# Patient Record
Sex: FEMALE | Race: WHITE | NOT HISPANIC OR LATINO | Employment: OTHER | URBAN - METROPOLITAN AREA
[De-identification: names, ages, dates, MRNs, and addresses within clinical notes are randomized per-mention and may not be internally consistent; named-entity substitution may affect disease eponyms.]

---

## 2017-11-14 ENCOUNTER — HISTORICAL (OUTPATIENT)
Dept: RADIOLOGY | Facility: HOSPITAL | Age: 47
End: 2017-11-14

## 2018-03-28 ENCOUNTER — HISTORICAL (OUTPATIENT)
Dept: ADMINISTRATIVE | Facility: HOSPITAL | Age: 48
End: 2018-03-28

## 2018-05-03 ENCOUNTER — HISTORICAL (OUTPATIENT)
Dept: ADMINISTRATIVE | Facility: HOSPITAL | Age: 48
End: 2018-05-03

## 2018-05-06 ENCOUNTER — HISTORICAL (OUTPATIENT)
Dept: ADMINISTRATIVE | Facility: HOSPITAL | Age: 48
End: 2018-05-06

## 2018-05-08 ENCOUNTER — HISTORICAL (OUTPATIENT)
Dept: ADMINISTRATIVE | Facility: HOSPITAL | Age: 48
End: 2018-05-08

## 2019-03-13 ENCOUNTER — HISTORICAL (OUTPATIENT)
Dept: ADMINISTRATIVE | Facility: HOSPITAL | Age: 49
End: 2019-03-13

## 2019-03-14 ENCOUNTER — HISTORICAL (OUTPATIENT)
Dept: ADMINISTRATIVE | Facility: HOSPITAL | Age: 49
End: 2019-03-14

## 2019-03-16 ENCOUNTER — HISTORICAL (OUTPATIENT)
Dept: ADMINISTRATIVE | Facility: HOSPITAL | Age: 49
End: 2019-03-16

## 2019-04-09 ENCOUNTER — HISTORICAL (OUTPATIENT)
Dept: ADMINISTRATIVE | Facility: HOSPITAL | Age: 49
End: 2019-04-09

## 2019-09-16 ENCOUNTER — HISTORICAL (OUTPATIENT)
Dept: ADMINISTRATIVE | Facility: HOSPITAL | Age: 49
End: 2019-09-16

## 2019-10-01 ENCOUNTER — HISTORICAL (OUTPATIENT)
Dept: ADMINISTRATIVE | Facility: HOSPITAL | Age: 49
End: 2019-10-01

## 2019-12-06 ENCOUNTER — HISTORICAL (OUTPATIENT)
Dept: ADMINISTRATIVE | Facility: HOSPITAL | Age: 49
End: 2019-12-06

## 2021-10-22 ENCOUNTER — HISTORICAL (OUTPATIENT)
Dept: ADMINISTRATIVE | Facility: HOSPITAL | Age: 51
End: 2021-10-22

## 2021-11-02 ENCOUNTER — HISTORICAL (OUTPATIENT)
Dept: ADMINISTRATIVE | Facility: HOSPITAL | Age: 51
End: 2021-11-02

## 2022-11-01 ENCOUNTER — HISTORICAL (OUTPATIENT)
Dept: ADMINISTRATIVE | Facility: HOSPITAL | Age: 52
End: 2022-11-01

## 2023-03-23 ENCOUNTER — HOSPITAL ENCOUNTER (OUTPATIENT)
Dept: RADIOLOGY | Facility: HOSPITAL | Age: 53
Discharge: HOME | End: 2023-03-23
Attending: FAMILY MEDICINE
Payer: MEDICARE

## 2023-03-23 DIAGNOSIS — R51.9 FACIAL PAIN: ICD-10-CM

## 2023-03-23 PROCEDURE — 70450 CT HEAD/BRAIN W/O DYE: CPT | Mod: TC

## 2023-09-06 ENCOUNTER — HOSPITAL ENCOUNTER (OUTPATIENT)
Dept: RADIOLOGY | Facility: HOSPITAL | Age: 53
Discharge: HOME | End: 2023-09-06
Attending: FAMILY MEDICINE
Payer: MEDICARE

## 2023-09-06 DIAGNOSIS — M25.532 LEFT WRIST PAIN: ICD-10-CM

## 2023-09-06 PROCEDURE — 76881 US COMPL JOINT R-T W/IMG: CPT | Mod: TC,LT

## 2024-01-03 ENCOUNTER — HOSPITAL ENCOUNTER (OUTPATIENT)
Dept: RADIOLOGY | Facility: HOSPITAL | Age: 54
Discharge: HOME | End: 2024-01-03
Attending: FAMILY MEDICINE
Payer: MEDICARE

## 2024-01-03 DIAGNOSIS — R10.9 STOMACH ACHE: ICD-10-CM

## 2024-01-03 PROCEDURE — 74176 CT ABD & PELVIS W/O CONTRAST: CPT | Mod: TC

## 2024-04-01 ENCOUNTER — HOSPITAL ENCOUNTER (OUTPATIENT)
Dept: RADIOLOGY | Facility: HOSPITAL | Age: 54
Discharge: HOME | End: 2024-04-01
Attending: FAMILY MEDICINE
Payer: MEDICARE

## 2024-04-01 DIAGNOSIS — M25.531 RIGHT WRIST PAIN: ICD-10-CM

## 2024-04-01 PROCEDURE — 73110 X-RAY EXAM OF WRIST: CPT | Mod: TC,RT

## 2024-05-13 ENCOUNTER — HOSPITAL ENCOUNTER (INPATIENT)
Facility: HOSPITAL | Age: 54
LOS: 1 days | Discharge: HOME | DRG: 437 | End: 2024-05-14
Attending: STUDENT IN AN ORGANIZED HEALTH CARE EDUCATION/TRAINING PROGRAM | Admitting: INTERNAL MEDICINE
Payer: MEDICARE

## 2024-05-13 ENCOUNTER — HOSPITAL ENCOUNTER (EMERGENCY)
Facility: HOSPITAL | Age: 54
Discharge: HOME | End: 2024-05-13
Attending: FAMILY MEDICINE
Payer: MEDICARE

## 2024-05-13 VITALS
HEIGHT: 70 IN | SYSTOLIC BLOOD PRESSURE: 101 MMHG | OXYGEN SATURATION: 97 % | HEART RATE: 106 BPM | BODY MASS INDEX: 21.9 KG/M2 | DIASTOLIC BLOOD PRESSURE: 68 MMHG | WEIGHT: 153 LBS | RESPIRATION RATE: 18 BRPM | TEMPERATURE: 98 F

## 2024-05-13 DIAGNOSIS — C34.90 NON-SMALL CELL LUNG CANCER, UNSPECIFIED LATERALITY: ICD-10-CM

## 2024-05-13 DIAGNOSIS — C80.1 METASTASIS TO LIVER WITH UNKNOWN PRIMARY SITE: Primary | ICD-10-CM

## 2024-05-13 DIAGNOSIS — C78.7 METASTASIS TO LIVER WITH UNKNOWN PRIMARY SITE: Primary | ICD-10-CM

## 2024-05-13 DIAGNOSIS — N39.0 URINARY TRACT INFECTION WITHOUT HEMATURIA, SITE UNSPECIFIED: ICD-10-CM

## 2024-05-13 DIAGNOSIS — C34.90 LUNG CANCER: ICD-10-CM

## 2024-05-13 DIAGNOSIS — R10.9 ABDOMINAL PAIN, UNSPECIFIED ABDOMINAL LOCATION: ICD-10-CM

## 2024-05-13 LAB
ALBUMIN SERPL-MCNC: 4.4 G/DL (ref 3.4–5)
ALBUMIN/GLOB SERPL: 1.1 RATIO
ALP SERPL-CCNC: 579 UNIT/L (ref 50–144)
ALT SERPL-CCNC: 30 UNIT/L (ref 1–45)
AMYLASE SERPL-CCNC: 50 UNIT/L (ref 30–100)
ANION GAP SERPL CALC-SCNC: 11 MEQ/L (ref 2–13)
AST SERPL-CCNC: 45 UNIT/L (ref 14–36)
B-HCG FREE SERPL-ACNC: <2.39 MIU/ML
BACTERIA #/AREA URNS AUTO: ABNORMAL /HPF
BASOPHILS # BLD AUTO: 0.04 X10(3)/MCL (ref 0.01–0.08)
BASOPHILS NFR BLD AUTO: 0.2 % (ref 0.1–1.2)
BILIRUB SERPL-MCNC: 1.7 MG/DL (ref 0–1)
BILIRUB UR QL STRIP.AUTO: ABNORMAL
BUN SERPL-MCNC: 22 MG/DL (ref 7–20)
CALCIUM SERPL-MCNC: 10.2 MG/DL (ref 8.4–10.2)
CHLORIDE SERPL-SCNC: 95 MMOL/L (ref 98–110)
CLARITY UR: CLEAR
CO2 SERPL-SCNC: 29 MMOL/L (ref 21–32)
COLOR UR AUTO: YELLOW
CREAT SERPL-MCNC: 1.45 MG/DL (ref 0.66–1.25)
CREAT/UREA NIT SERPL: 15 (ref 12–20)
EOSINOPHIL # BLD AUTO: 0.01 X10(3)/MCL (ref 0.04–0.36)
EOSINOPHIL NFR BLD AUTO: 0.1 % (ref 0.7–7)
ERYTHROCYTE [DISTWIDTH] IN BLOOD BY AUTOMATED COUNT: 13.8 % (ref 11–14.5)
GFR SERPLBLD CREATININE-BSD FMLA CKD-EPI: 43 ML/MIN/1.73/M2
GLOBULIN SER-MCNC: 3.9 GM/DL (ref 2–3.9)
GLUCOSE SERPL-MCNC: 258 MG/DL (ref 70–115)
GLUCOSE UR QL STRIP: 250
GRAN CASTS URNS QL MICRO: ABNORMAL /LPF
HCT VFR BLD AUTO: 38.1 % (ref 36–48)
HGB BLD-MCNC: 12.3 G/DL (ref 11.8–16)
HGB UR QL STRIP: ABNORMAL
IMM GRANULOCYTES # BLD AUTO: 0.08 X10(3)/MCL (ref 0–0.03)
IMM GRANULOCYTES NFR BLD AUTO: 0.5 % (ref 0–0.5)
KETONES UR QL STRIP: ABNORMAL
LEUKOCYTE ESTERASE UR QL STRIP: ABNORMAL
LIPASE SERPL-CCNC: <20 U/L (ref 23–300)
LYMPHOCYTES # BLD AUTO: 0.66 X10(3)/MCL (ref 1.16–3.74)
LYMPHOCYTES NFR BLD AUTO: 4 % (ref 20–55)
MAGNESIUM SERPL-MCNC: 2.3 MG/DL (ref 1.8–2.4)
MCH RBC QN AUTO: 25.8 PG (ref 27–34)
MCHC RBC AUTO-ENTMCNC: 32.3 G/DL (ref 31–37)
MCV RBC AUTO: 79.9 FL (ref 79–99)
MONOCYTES # BLD AUTO: 1.28 X10(3)/MCL (ref 0.24–0.36)
MONOCYTES NFR BLD AUTO: 7.8 % (ref 4.7–12.5)
NEUTROPHILS # BLD AUTO: 14.31 X10(3)/MCL (ref 1.56–6.13)
NEUTROPHILS NFR BLD AUTO: 87.4 % (ref 37–73)
NITRITE UR QL STRIP: POSITIVE
NRBC BLD AUTO-RTO: 0 %
PH UR STRIP: 6 [PH]
PLATELET # BLD AUTO: 275 X10(3)/MCL (ref 140–371)
PMV BLD AUTO: 9.5 FL (ref 9.4–12.4)
POTASSIUM SERPL-SCNC: 3.7 MMOL/L (ref 3.5–5.1)
PROT SERPL-MCNC: 8.3 GM/DL (ref 6.3–8.2)
PROT UR QL STRIP: >=300
RBC # BLD AUTO: 4.77 X10(6)/MCL (ref 4–5.1)
RBC #/AREA URNS AUTO: ABNORMAL /HPF
SODIUM SERPL-SCNC: 135 MMOL/L (ref 136–145)
SP GR UR STRIP.AUTO: >=1.03 (ref 1–1.03)
SQUAMOUS #/AREA URNS AUTO: ABNORMAL /HPF
UROBILINOGEN UR STRIP-ACNC: 4
WBC # SPEC AUTO: 16.38 X10(3)/MCL (ref 4–11.5)
WBC #/AREA URNS AUTO: ABNORMAL /HPF

## 2024-05-13 PROCEDURE — 96361 HYDRATE IV INFUSION ADD-ON: CPT

## 2024-05-13 PROCEDURE — 25000003 PHARM REV CODE 250: Performed by: FAMILY MEDICINE

## 2024-05-13 PROCEDURE — 83690 ASSAY OF LIPASE: CPT | Performed by: FAMILY MEDICINE

## 2024-05-13 PROCEDURE — 87086 URINE CULTURE/COLONY COUNT: CPT | Performed by: FAMILY MEDICINE

## 2024-05-13 PROCEDURE — 82150 ASSAY OF AMYLASE: CPT | Performed by: FAMILY MEDICINE

## 2024-05-13 PROCEDURE — 83735 ASSAY OF MAGNESIUM: CPT | Performed by: FAMILY MEDICINE

## 2024-05-13 PROCEDURE — 80053 COMPREHEN METABOLIC PANEL: CPT | Performed by: FAMILY MEDICINE

## 2024-05-13 PROCEDURE — 25000003 PHARM REV CODE 250: Performed by: INTERNAL MEDICINE

## 2024-05-13 PROCEDURE — 11000001 HC ACUTE MED/SURG PRIVATE ROOM

## 2024-05-13 PROCEDURE — 85025 COMPLETE CBC W/AUTO DIFF WBC: CPT | Performed by: FAMILY MEDICINE

## 2024-05-13 PROCEDURE — 81001 URINALYSIS AUTO W/SCOPE: CPT | Performed by: FAMILY MEDICINE

## 2024-05-13 PROCEDURE — 96376 TX/PRO/DX INJ SAME DRUG ADON: CPT

## 2024-05-13 PROCEDURE — 84702 CHORIONIC GONADOTROPIN TEST: CPT | Performed by: FAMILY MEDICINE

## 2024-05-13 PROCEDURE — 96365 THER/PROPH/DIAG IV INF INIT: CPT | Mod: 59

## 2024-05-13 PROCEDURE — 25500020 PHARM REV CODE 255: Performed by: FAMILY MEDICINE

## 2024-05-13 PROCEDURE — 99285 EMERGENCY DEPT VISIT HI MDM: CPT | Mod: 25

## 2024-05-13 PROCEDURE — 96375 TX/PRO/DX INJ NEW DRUG ADDON: CPT

## 2024-05-13 PROCEDURE — 63600175 PHARM REV CODE 636 W HCPCS: Performed by: FAMILY MEDICINE

## 2024-05-13 RX ORDER — ACETAMINOPHEN 325 MG/1
650 TABLET ORAL EVERY 8 HOURS PRN
Status: DISCONTINUED | OUTPATIENT
Start: 2024-05-13 | End: 2024-05-14 | Stop reason: HOSPADM

## 2024-05-13 RX ORDER — ONDANSETRON HYDROCHLORIDE 2 MG/ML
4 INJECTION, SOLUTION INTRAVENOUS
Status: COMPLETED | OUTPATIENT
Start: 2024-05-13 | End: 2024-05-13

## 2024-05-13 RX ORDER — HYDROMORPHONE HYDROCHLORIDE 1 MG/ML
0.5 INJECTION, SOLUTION INTRAMUSCULAR; INTRAVENOUS; SUBCUTANEOUS
Status: COMPLETED | OUTPATIENT
Start: 2024-05-13 | End: 2024-05-13

## 2024-05-13 RX ORDER — ONDANSETRON HYDROCHLORIDE 2 MG/ML
4 INJECTION, SOLUTION INTRAVENOUS EVERY 8 HOURS PRN
Status: DISCONTINUED | OUTPATIENT
Start: 2024-05-13 | End: 2024-05-14 | Stop reason: HOSPADM

## 2024-05-13 RX ORDER — SODIUM CHLORIDE 0.9 % (FLUSH) 0.9 %
10 SYRINGE (ML) INJECTION
Status: DISCONTINUED | OUTPATIENT
Start: 2024-05-13 | End: 2024-05-14 | Stop reason: HOSPADM

## 2024-05-13 RX ORDER — OXYCODONE HYDROCHLORIDE 5 MG/1
5 TABLET ORAL EVERY 4 HOURS PRN
Status: DISCONTINUED | OUTPATIENT
Start: 2024-05-13 | End: 2024-05-14 | Stop reason: HOSPADM

## 2024-05-13 RX ORDER — TRAZODONE HYDROCHLORIDE 100 MG/1
2 TABLET ORAL NIGHTLY
COMMUNITY

## 2024-05-13 RX ORDER — TALC
6 POWDER (GRAM) TOPICAL NIGHTLY PRN
Status: DISCONTINUED | OUTPATIENT
Start: 2024-05-13 | End: 2024-05-14 | Stop reason: HOSPADM

## 2024-05-13 RX ORDER — HYDROMORPHONE HYDROCHLORIDE 1 MG/ML
1 INJECTION, SOLUTION INTRAMUSCULAR; INTRAVENOUS; SUBCUTANEOUS
Status: COMPLETED | OUTPATIENT
Start: 2024-05-13 | End: 2024-05-13

## 2024-05-13 RX ADMIN — ONDANSETRON 4 MG: 2 INJECTION INTRAMUSCULAR; INTRAVENOUS at 11:05

## 2024-05-13 RX ADMIN — CEFTRIAXONE SODIUM 1 G: 1 INJECTION, POWDER, FOR SOLUTION INTRAMUSCULAR; INTRAVENOUS at 12:05

## 2024-05-13 RX ADMIN — Medication 6 MG: at 10:05

## 2024-05-13 RX ADMIN — HYDROMORPHONE HYDROCHLORIDE 0.5 MG: 1 INJECTION, SOLUTION INTRAMUSCULAR; INTRAVENOUS; SUBCUTANEOUS at 12:05

## 2024-05-13 RX ADMIN — HYDROMORPHONE HYDROCHLORIDE 0.5 MG: 1 INJECTION, SOLUTION INTRAMUSCULAR; INTRAVENOUS; SUBCUTANEOUS at 03:05

## 2024-05-13 RX ADMIN — IOHEXOL 90 ML: 300 INJECTION, SOLUTION INTRAVENOUS at 12:05

## 2024-05-13 RX ADMIN — SODIUM CHLORIDE 1000 ML: 9 INJECTION, SOLUTION INTRAVENOUS at 12:05

## 2024-05-13 RX ADMIN — OXYCODONE HYDROCHLORIDE 5 MG: 5 TABLET ORAL at 10:05

## 2024-05-13 RX ADMIN — HYDROMORPHONE HYDROCHLORIDE 1 MG: 1 INJECTION, SOLUTION INTRAMUSCULAR; INTRAVENOUS; SUBCUTANEOUS at 11:05

## 2024-05-13 RX ADMIN — SODIUM CHLORIDE 1000 ML: 9 INJECTION, SOLUTION INTRAVENOUS at 11:05

## 2024-05-13 NOTE — ED PROVIDER NOTES
Encounter Date: 5/13/2024       History     Chief Complaint   Patient presents with    Abdominal Pain    Dysuria    Nausea    Vomiting     PRESENTS TO ED PER POV WITH C/O RLQ ABDOMINAL PAIN RADIATING TO RT. ARM, N/V, AND DYSURIA. SENT TO ED FOR EVAL BY DR. MONTES     Patient presents for evaluation of abdominal pain.  Patient notes having abdominal pain for the past several weeks but pain has been more intense for the past 2 weeks.  Pain is present constantly throughout the day and patient has frequent painful episodes.  Patient notes having some mild nausea at times but emergency room at present is not having any nausea.  At present pain is moderate-to-severe aching pain that is constant.  Pressure to the right lower quadrant worsens pain.  Patient also notes having some anorexia with her symptoms.  Patient denies having any other associated symptoms at present.    The history is provided by the patient.     Review of patient's allergies indicates:   Allergen Reactions    Ketorolac Hives    Tramadol Hives    Vancomycin analogues Hives     Past Medical History:   Diagnosis Date    Brain tumor     Cervical ca     Depression     Opioid abuse      Past Surgical History:   Procedure Laterality Date    BRAIN SURGERY      CHOLECYSTECTOMY      HYSTERECTOMY       No family history on file.  Social History     Tobacco Use    Smoking status: Former     Types: Cigarettes    Smokeless tobacco: Never   Substance Use Topics    Alcohol use: Never    Drug use: Not Currently     Types: Cocaine     Comment: 5-6 YEARS QUIT ON SUBOXONE     Review of Systems   Constitutional: Negative.    HENT: Negative.     Eyes: Negative.    Respiratory: Negative.     Cardiovascular: Negative.    Gastrointestinal:  Positive for abdominal pain and nausea.   Endocrine: Negative.    Genitourinary: Negative.    Musculoskeletal: Negative.    Skin: Negative.    Allergic/Immunologic: Negative.    Neurological: Negative.    Hematological: Negative.     Psychiatric/Behavioral: Negative.         Physical Exam     Initial Vitals [05/13/24 1020]   BP Pulse Resp Temp SpO2   119/79 95 18 97.8 °F (36.6 °C) 98 %      MAP       --         Physical Exam    Vitals reviewed.  Constitutional: She appears well-developed and well-nourished. She is not diaphoretic. No distress.   HENT:   Head: Normocephalic and atraumatic.   Mouth/Throat: No oropharyngeal exudate.   Eyes: Conjunctivae and EOM are normal. Pupils are equal, round, and reactive to light. Right eye exhibits no discharge. Left eye exhibits no discharge. No scleral icterus.   Neck: No thyromegaly present. No tracheal deviation present. No JVD present.   Normal range of motion.  Cardiovascular:  Normal rate, regular rhythm and intact distal pulses.     Exam reveals no gallop and no friction rub.       No murmur heard.  Pulmonary/Chest: Breath sounds normal. No stridor. No respiratory distress. She has no wheezes. She has no rhonchi. She has no rales. She exhibits no tenderness.   Abdominal: Abdomen is soft. Bowel sounds are normal. She exhibits no distension and no mass. There is abdominal tenderness.   Moderate-to-severe tenderness to right upper quadrant. There is guarding. There is no rebound.   Musculoskeletal:         General: No tenderness or edema. Normal range of motion.      Cervical back: Normal range of motion.     Neurological: She is alert and oriented to person, place, and time. She has normal reflexes. She displays normal reflexes. No cranial nerve deficit or sensory deficit. GCS score is 15. GCS eye subscore is 4. GCS verbal subscore is 5. GCS motor subscore is 6.   Skin: Skin is warm. No rash and no abscess noted. No erythema. No pallor.   Psychiatric: She has a normal mood and affect.         ED Course   Procedures  Labs Reviewed   COMPREHENSIVE METABOLIC PANEL - Abnormal; Notable for the following components:       Result Value    Sodium 135 (*)     Chloride 95 (*)     Glucose 258 (*)     Blood Urea  Nitrogen 22 (*)     Creatinine 1.45 (*)     Protein Total 8.3 (*)     Bilirubin Total 1.7 (*)      (*)     AST 45 (*)     All other components within normal limits   LIPASE - Abnormal; Notable for the following components:    Lipase Level <20 (*)     All other components within normal limits   URINALYSIS - Abnormal; Notable for the following components:    Protein, UA >=300 (*)     Glucose,  (*)     Ketones, UA Trace (*)     Blood, UA Small (*)     Bilirubin, UA Moderate (*)     Urobilinogen, UA 4.0 (*)     Nitrites, UA Positive (*)     Leukocyte Esterase, UA Trace (*)     All other components within normal limits    Narrative:      URINE STABILITY IS 2 HOURS AT ROOM TEMP OR    SIX HOURS REFRIGERATED. PERFORMING TESTING ON    SPECIMENS GREATER THAN THIS AGE MAY AFFECT THE    FOLLOWING TESTS:    PH          SPECIFIC GRAVITY           BLOOD    CLARITY     BILIRUBIN               UROBILINOGEN   CBC WITH DIFFERENTIAL - Abnormal; Notable for the following components:    WBC 16.38 (*)     MCH 25.8 (*)     Neut % 87.4 (*)     Lymph % 4.0 (*)     Eos % 0.1 (*)     Lymph # 0.66 (*)     Neut # 14.31 (*)     Mono # 1.28 (*)     Eos # 0.01 (*)     IG# 0.08 (*)     All other components within normal limits   URINALYSIS, MICROSCOPIC - Abnormal; Notable for the following components:    Bacteria, UA Moderate (*)     Granular Casts, UA Few (*)     WBC, UA 6-10 (*)     Squamous Epithelial Cells, UA Few (*)     All other components within normal limits   MAGNESIUM - Normal   AMYLASE - Normal   CULTURE, URINE   CBC W/ AUTO DIFFERENTIAL    Narrative:     The following orders were created for panel order CBC auto differential.  Procedure                               Abnormality         Status                     ---------                               -----------         ------                     CBC with Differential[[id number]]       Abnormal            Final result                 Please view results for these tests on  the individual orders.   HCG, QUANTITATIVE    Narrative:     Beta-HCG ref range weeks after implantation (mIU/mL):   3-4wks 9-130   4-5wks    5-6wks 850-07570   6-7wks 4500-806239   7-12wks [id number]   12-16wks 39858-462193   16-28wks 1400-36339   20-41wks [phone]          Imaging Results              CT Abdomen Pelvis With IV Contrast NO Oral Contrast (Final result)  Result time 05/13/24 13:41:24      Final result by Javi Trujillo MD (05/13/24 13:41:24)                   Impression:      Changes most consistent with metastatic disease to the liver.      Electronically signed by: Javi Trujillo MD  Date:    05/13/2024  Time:    13:41               Narrative:    EXAMINATION:  CT ABDOMEN PELVIS WITH IV CONTRAST    CLINICAL HISTORY:  Non-small cell lung cancer (NSCLC), staging;    TECHNIQUE:  Low dose axial images, sagittal and coronal reformations were obtained from the lung bases to the pubic symphysis following the IV administration of 90 mL of Omnipaque 300 no oral contrast was given.    Automatic exposure control (AEC) was utilized for dose reduction.    Dose: 403 mGycm    COMPARISON:  01/03/2024    FINDINGS:  There are mild atelectatic changes in the left lung base.  There are lesions throughout the liver highly suspicious for metastatic disease.  The largest measures 5.8 x 7 cm.  Spleen appears normal.  Pancreas appears normal.  The adrenals are not enlarged.  Kidneys appear normal.  Aorta shows no evidence of an aneurysm.  The appendix appears normal.  Small bowel is not dilated                                       Medications   sodium chloride 0.9% bolus 1,000 mL 1,000 mL (0 mLs Intravenous Stopped 5/13/24 1208)   HYDROmorphone injection 1 mg (1 mg Intravenous Given 5/13/24 1139)   ondansetron injection 4 mg (4 mg Intravenous Given 5/13/24 1139)   sodium chloride 0.9% bolus 1,000 mL 1,000 mL (0 mLs Intravenous Stopped 5/13/24 1324)   cefTRIAXone (Rocephin) 1 g in dextrose 5 % in water (D5W) 100  mL IVPB (MB+) (0 g Intravenous Stopped 5/13/24 1254)   iohexoL (OMNIPAQUE 300) injection 90 mL (90 mLs Intravenous Given 5/13/24 1231)   HYDROmorphone injection 0.5 mg (0.5 mg Intravenous Given 5/13/24 1241)     Medical Decision Making  Amount and/or Complexity of Data Reviewed  Labs: ordered.  Radiology: ordered.    Risk  Prescription drug management.                           Patient accepted for admission by Dr. Ruano at [facility].           Clinical Impression:  Final diagnoses:  [C78.7, C80.1] Metastasis to liver with unknown primary site (Primary)  [R10.9] Abdominal pain, unspecified abdominal location  [C34.90] Non-small cell lung cancer, unspecified laterality  [N39.0] Urinary tract infection without hematuria, site unspecified          ED Disposition Condition    Transfer to Another Facility Jamal Zaldivar MD  05/13/24 5446

## 2024-05-14 VITALS
RESPIRATION RATE: 18 BRPM | HEART RATE: 92 BPM | SYSTOLIC BLOOD PRESSURE: 105 MMHG | BODY MASS INDEX: 22.15 KG/M2 | WEIGHT: 154.75 LBS | DIASTOLIC BLOOD PRESSURE: 70 MMHG | TEMPERATURE: 98 F | HEIGHT: 70 IN | OXYGEN SATURATION: 96 %

## 2024-05-14 LAB
AFP-TM SERPL-MCNC: <2 NG/ML
ALBUMIN SERPL-MCNC: 2.6 G/DL (ref 3.5–5)
ALBUMIN/GLOB SERPL: 0.7 RATIO (ref 1.1–2)
ALP SERPL-CCNC: 459 UNIT/L (ref 40–150)
ALT SERPL-CCNC: 12 UNIT/L (ref 0–55)
AST SERPL-CCNC: 18 UNIT/L (ref 5–34)
BACTERIA #/AREA URNS AUTO: ABNORMAL /HPF
BASOPHILS # BLD AUTO: 0.03 X10(3)/MCL
BASOPHILS NFR BLD AUTO: 0.3 %
BILIRUB SERPL-MCNC: 0.6 MG/DL
BILIRUB UR QL STRIP.AUTO: NEGATIVE
BUN SERPL-MCNC: 13.9 MG/DL (ref 9.8–20.1)
CALCIUM SERPL-MCNC: 9.1 MG/DL (ref 8.4–10.2)
CANCER AG125 SERPL-ACNC: 58.4 UNIT/ML (ref 0–35)
CANCER AG19-9 SERPL-ACNC: 302.04 UNIT/ML (ref 0–37)
CEA SERPL-MCNC: 546.58 NG/ML (ref 0–3)
CHLORIDE SERPL-SCNC: 105 MMOL/L (ref 98–107)
CLARITY UR: CLEAR
CO2 SERPL-SCNC: 26 MMOL/L (ref 22–29)
COLOR UR AUTO: YELLOW
CREAT SERPL-MCNC: 0.82 MG/DL (ref 0.55–1.02)
EOSINOPHIL # BLD AUTO: 0.1 X10(3)/MCL (ref 0–0.9)
EOSINOPHIL NFR BLD AUTO: 0.9 %
ERYTHROCYTE [DISTWIDTH] IN BLOOD BY AUTOMATED COUNT: 14.2 % (ref 11.5–17)
GFR SERPLBLD CREATININE-BSD FMLA CKD-EPI: >60 ML/MIN/1.73/M2
GLOBULIN SER-MCNC: 3.8 GM/DL (ref 2.4–3.5)
GLUCOSE SERPL-MCNC: 114 MG/DL (ref 74–100)
GLUCOSE UR QL STRIP: ABNORMAL
HCT VFR BLD AUTO: 30.2 % (ref 37–47)
HGB BLD-MCNC: 9.8 G/DL (ref 12–16)
HGB UR QL STRIP: ABNORMAL
IMM GRANULOCYTES # BLD AUTO: 0.05 X10(3)/MCL (ref 0–0.04)
IMM GRANULOCYTES NFR BLD AUTO: 0.5 %
KETONES UR QL STRIP: NEGATIVE
LEUKOCYTE ESTERASE UR QL STRIP: NEGATIVE
LYMPHOCYTES # BLD AUTO: 1.75 X10(3)/MCL (ref 0.6–4.6)
LYMPHOCYTES NFR BLD AUTO: 16.3 %
MAGNESIUM SERPL-MCNC: 2.1 MG/DL (ref 1.6–2.6)
MCH RBC QN AUTO: 26.1 PG (ref 27–31)
MCHC RBC AUTO-ENTMCNC: 32.5 G/DL (ref 33–36)
MCV RBC AUTO: 80.3 FL (ref 80–94)
MONOCYTES # BLD AUTO: 1.23 X10(3)/MCL (ref 0.1–1.3)
MONOCYTES NFR BLD AUTO: 11.4 %
MUCOUS THREADS URNS QL MICRO: ABNORMAL /LPF
NEUTROPHILS # BLD AUTO: 7.59 X10(3)/MCL (ref 2.1–9.2)
NEUTROPHILS NFR BLD AUTO: 70.6 %
NITRITE UR QL STRIP: NEGATIVE
NRBC BLD AUTO-RTO: 0 %
PH UR STRIP: 6 [PH]
PLATELET # BLD AUTO: 254 X10(3)/MCL (ref 130–400)
PMV BLD AUTO: 10.2 FL (ref 7.4–10.4)
POTASSIUM SERPL-SCNC: 3.4 MMOL/L (ref 3.5–5.1)
PROT SERPL-MCNC: 6.4 GM/DL (ref 6.4–8.3)
PROT UR QL STRIP: ABNORMAL
RBC # BLD AUTO: 3.76 X10(6)/MCL (ref 4.2–5.4)
RBC #/AREA URNS AUTO: ABNORMAL /HPF
SODIUM SERPL-SCNC: 138 MMOL/L (ref 136–145)
SP GR UR STRIP.AUTO: 1.03 (ref 1–1.03)
SQUAMOUS #/AREA URNS LPF: ABNORMAL /HPF
UROBILINOGEN UR STRIP-ACNC: 8
WBC # SPEC AUTO: 10.75 X10(3)/MCL (ref 4.5–11.5)
WBC #/AREA URNS AUTO: ABNORMAL /HPF

## 2024-05-14 PROCEDURE — 86301 IMMUNOASSAY TUMOR CA 19-9: CPT | Performed by: INTERNAL MEDICINE

## 2024-05-14 PROCEDURE — 25000003 PHARM REV CODE 250: Performed by: INTERNAL MEDICINE

## 2024-05-14 PROCEDURE — 85025 COMPLETE CBC W/AUTO DIFF WBC: CPT | Performed by: INTERNAL MEDICINE

## 2024-05-14 PROCEDURE — 63600175 PHARM REV CODE 636 W HCPCS: Performed by: INTERNAL MEDICINE

## 2024-05-14 PROCEDURE — 83735 ASSAY OF MAGNESIUM: CPT | Performed by: INTERNAL MEDICINE

## 2024-05-14 PROCEDURE — 86304 IMMUNOASSAY TUMOR CA 125: CPT | Performed by: INTERNAL MEDICINE

## 2024-05-14 PROCEDURE — 36415 COLL VENOUS BLD VENIPUNCTURE: CPT | Performed by: INTERNAL MEDICINE

## 2024-05-14 PROCEDURE — 82378 CARCINOEMBRYONIC ANTIGEN: CPT | Performed by: INTERNAL MEDICINE

## 2024-05-14 PROCEDURE — 80053 COMPREHEN METABOLIC PANEL: CPT | Performed by: INTERNAL MEDICINE

## 2024-05-14 PROCEDURE — 81001 URINALYSIS AUTO W/SCOPE: CPT | Performed by: INTERNAL MEDICINE

## 2024-05-14 PROCEDURE — 82105 ALPHA-FETOPROTEIN SERUM: CPT | Performed by: INTERNAL MEDICINE

## 2024-05-14 RX ORDER — POTASSIUM CHLORIDE 20 MEQ/1
40 TABLET, EXTENDED RELEASE ORAL
Status: COMPLETED | OUTPATIENT
Start: 2024-05-14 | End: 2024-05-14

## 2024-05-14 RX ORDER — MORPHINE SULFATE 4 MG/ML
2 INJECTION, SOLUTION INTRAMUSCULAR; INTRAVENOUS EVERY 4 HOURS PRN
Status: DISCONTINUED | OUTPATIENT
Start: 2024-05-14 | End: 2024-05-14 | Stop reason: HOSPADM

## 2024-05-14 RX ORDER — TRAZODONE HYDROCHLORIDE 100 MG/1
200 TABLET ORAL NIGHTLY
Status: DISCONTINUED | OUTPATIENT
Start: 2024-05-14 | End: 2024-05-14 | Stop reason: HOSPADM

## 2024-05-14 RX ORDER — OXYCODONE HYDROCHLORIDE 5 MG/1
5 TABLET ORAL EVERY 6 HOURS PRN
Qty: 28 TABLET | Refills: 0 | Status: SHIPPED | OUTPATIENT
Start: 2024-05-14 | End: 2024-05-21

## 2024-05-14 RX ADMIN — TRAZODONE HYDROCHLORIDE 200 MG: 100 TABLET ORAL at 01:05

## 2024-05-14 RX ADMIN — ONDANSETRON 4 MG: 2 INJECTION INTRAMUSCULAR; INTRAVENOUS at 12:05

## 2024-05-14 RX ADMIN — OXYCODONE HYDROCHLORIDE 5 MG: 5 TABLET ORAL at 07:05

## 2024-05-14 RX ADMIN — ONDANSETRON 4 MG: 2 INJECTION INTRAMUSCULAR; INTRAVENOUS at 09:05

## 2024-05-14 RX ADMIN — POTASSIUM CHLORIDE 40 MEQ: 1500 TABLET, EXTENDED RELEASE ORAL at 09:05

## 2024-05-14 RX ADMIN — POTASSIUM CHLORIDE 40 MEQ: 1500 TABLET, EXTENDED RELEASE ORAL at 11:05

## 2024-05-14 RX ADMIN — MORPHINE SULFATE 2 MG: 4 INJECTION INTRAVENOUS at 12:05

## 2024-05-14 RX ADMIN — OXYCODONE HYDROCHLORIDE 5 MG: 5 TABLET ORAL at 11:05

## 2024-05-14 NOTE — PLAN OF CARE
Problem: Adult Inpatient Plan of Care  Goal: Plan of Care Review  Outcome: Progressing  Flowsheets (Taken 5/14/2024 0344)  Plan of Care Reviewed With: patient  Goal: Patient-Specific Goal (Individualized)  Outcome: Progressing  Flowsheets (Taken 5/14/2024 0344)  Individualized Care Needs: monitor labs., manage pain  Goal: Absence of Hospital-Acquired Illness or Injury  Outcome: Progressing  Intervention: Identify and Manage Fall Risk  Flowsheets (Taken 5/14/2024 0344)  Safety Promotion/Fall Prevention:   assistive device/personal item within reach   nonskid shoes/socks when out of bed  Intervention: Prevent Skin Injury  Flowsheets (Taken 5/14/2024 0344)  Body Position:   position changed independently   weight shifting  Device Skin Pressure Protection: adhesive use limited  Intervention: Prevent and Manage VTE (Venous Thromboembolism) Risk  Flowsheets (Taken 5/14/2024 0344)  VTE Prevention/Management:   ambulation promoted   fluids promoted  Intervention: Prevent Infection  Flowsheets (Taken 5/14/2024 0344)  Infection Prevention:   hand hygiene promoted   single patient room provided   rest/sleep promoted  Goal: Optimal Comfort and Wellbeing  Outcome: Progressing  Intervention: Monitor Pain and Promote Comfort  Flowsheets (Taken 5/14/2024 0344)  Pain Management Interventions:   care clustered   medication offered   quiet environment facilitated  Intervention: Provide Person-Centered Care  Flowsheets (Taken 5/14/2024 0344)  Trust Relationship/Rapport:   care explained   choices provided   questions answered   questions encouraged   reassurance provided   emotional support provided   empathic listening provided   thoughts/feelings acknowledged  Goal: Readiness for Transition of Care  Outcome: Progressing

## 2024-05-14 NOTE — PLAN OF CARE
Problem: Adult Inpatient Plan of Care  Goal: Plan of Care Review  Outcome: Progressing  Flowsheets (Taken 5/14/2024 0754)  Plan of Care Reviewed With:   patient   child  Goal: Patient-Specific Goal (Individualized)  Outcome: Progressing  Goal: Absence of Hospital-Acquired Illness or Injury  Outcome: Progressing  Goal: Optimal Comfort and Wellbeing  Outcome: Progressing  Goal: Readiness for Transition of Care  Outcome: Progressing

## 2024-05-14 NOTE — PLAN OF CARE
Problem: Adult Inpatient Plan of Care  Goal: Plan of Care Review  5/14/2024 1627 by Kimberly Lezama RN  Outcome: Met  5/14/2024 0751 by Kimberly Lezama RN  Outcome: Progressing  Flowsheets (Taken 5/14/2024 0751)  Plan of Care Reviewed With:   patient   child  Goal: Patient-Specific Goal (Individualized)  5/14/2024 1627 by Kimberly Lezama RN  Outcome: Met  5/14/2024 0751 by Kimberly Lezama RN  Outcome: Progressing  Goal: Absence of Hospital-Acquired Illness or Injury  5/14/2024 1627 by Kimberly Lezama RN  Outcome: Met  5/14/2024 0751 by Kimberly Lezama RN  Outcome: Progressing  Goal: Optimal Comfort and Wellbeing  5/14/2024 1627 by Kimberly Lezama RN  Outcome: Met  5/14/2024 0751 by Kimberly Lezama RN  Outcome: Progressing  Goal: Readiness for Transition of Care  5/14/2024 1627 by Kimberly Lezama RN  Outcome: Met  5/14/2024 0751 by Kimberly Lezama RN  Outcome: Progressing

## 2024-05-14 NOTE — DISCHARGE SUMMARY
[facility] Hospital Medicine Discharge Summary    Admit Date: 5/13/2024  Discharge Date and Time: 5/14/20242:44 PM  Admitting Physician:  Team  Discharging Physician: Neha Stevens MD.  Primary Care Physician: Amado Baig III, MD  Consults: Hospital Medicine and Interventional Radiology    Discharge Diagnoses:  Metastatic appearing liver lesions, uncertain etiology     Hx:  Benign brain tumor,  shunt, cervical cancer 15 years ago, benign thyroid tumor, opioid use disorder/dependence history       Hospital Course:   CHIEF COMPLAINT   Transferred for abnormal CT abdomen     HISTORY OF PRESENT ILLNESS:   Patient is a 54-year-old female with a history of cervical cancer over 15 years ago she reported was resected surgically and was local, followed by a total abdominal hysterectomy with no reported recurrence.  She also has a history of a brain tumor that was biopsied and found to be benign but require placement of a  shunt back in the 1980s.  She also had a benign thyroid tumor resected 8 years ago.  She has a history of opioid dependence but has been off of long-acting opiates for quite some time and not using drugs per her report.  She presented to an outside ER [facility] with complaint of abdominal pain x1 month and CT of the abdomen showed metastatic appearing liver lesions.  For this reason she was transferred here for an oncology evaluation.  Patient reports she was up-to-date on her mammograms and colonoscopy all within the last 3 years and negative.    She was admitted due to intractable pain from the metastatic appearing liver lesions.  IR was consulted and scheduled an outpatient CT-guided biopsy.  Patient was given IV pain meds and discharged on p.o. opiates-     Pt was seen and examined on the day of discharge  Vitals:  VITAL SIGNS: 24 HRS MIN & MAX LAST   Temp  Min: 97.9 °F (36.6 °C)  Max: 98.8 °F (37.1 °C) 98.7 °F (37.1 °C)   BP  Min: 85/53  Max: 133/82 96/66    Pulse  Min: 67  Max: 107  82   Resp  Min: 18  Max: 20 18   SpO2  Min: 94 %  Max: 99 % 95 %       Physical Exam:  GENERAL: awake, alert, oriented and in no acute distress, non-toxic appearing   HEENT: normocephalic atraumatic   NECK: supple   LUNGS: Clear bilaterally, no wheezing or rales, no accessory muscle use   CVS: Regular rate and rhythm, normal peripheral perfusion  ABD: Soft, non-tender, non-distended, bowel sounds present  EXTREMITIES: no clubbing or cyanosis  SKIN: Warm, dry.   NEURO: alert and oriented, grossly without focal deficits   PSYCHIATRIC: Cooperative    Procedures Performed: No admission procedures for hospital encounter.     Significant Diagnostic Studies: See Full reports for all details    Recent Labs   Lab 05/13/24  1056 05/14/24  0320   WBC 16.38* 10.75   RBC 4.77 3.76*   HGB 12.3 9.8*   HCT 38.1 30.2*   MCV 79.9 80.3   MCH 25.8* 26.1*   MCHC 32.3 32.5*   RDW 13.8 14.2    254   MPV 9.5 10.2       Recent Labs   Lab 05/13/24  1056 05/14/24  0320   * 138   K 3.7 3.4*   CO2 29 26   BUN 22* 13.9   CREATININE 1.45* 0.82   CALCIUM 10.2 9.1   MG 2.30 2.10   ALBUMIN 4.4 2.6*   ALKPHOS 579* 459*   ALT 30 12   AST 45* 18   BILITOT 1.7* 0.6        Microbiology Results (last 7 days)       ** No results found for the last 168 hours. **             CT Head Without Contrast  Narrative: EXAMINATION:  CT HEAD WITHOUT CONTRAST    CLINICAL HISTORY:  Metastatic disease evaluation;    TECHNIQUE:  Multiple axial images were obtained from the base of the brain to the vertex without contrast administration.  Sagittal and coronal reconstructions were performed. .Automatic exposure control  (AEC) is utilized to reduce patient radiation exposure.    COMPARISON:  03/23/2023    FINDINGS:  Some mild cerebral atrophy seen.  The patient has a ventriculoperitoneal shunt with a left posterior parietal approach and a right posterior parietal approach.  The ventricles are not significantly dilated.  There is area  of encephalomalacia in the right posterior parietal region which is unchanged.  No evidence of acute hemorrhage or infarction is seen.  No obvious mass is seen.  Posterior fossa appears grossly unremarkable.  The calvarium is intact.  Paranasal sinuses appear unremarkable.  Impression: Not significantly changed since prior examination    Electronically signed by: Isac Perez  Date:    05/14/2024  Time:    11:50  CT Chest Without Contrast  Narrative: EXAMINATION:  CT CHEST WITHOUT CONTRAST    CLINICAL HISTORY:  metastatic eval;    TECHNIQUE:  Helical acquisition through the chest performed without IV contrast. Three plane reconstructions made available for review.  mGycm. Automatic exposure control, adjustment of mA/kV or iterative reconstruction technique was used to reduce radiation.    COMPARISON:  30 October 2017    FINDINGS:  There is no mediastinal, hilar or axillary lymphadenopathy by size criteria.    The heart is not enlarged.  Mild coronary artery calcifications.  No pericardial effusion.    There is no pleural effusion.  Linear opacities towards the lung bases most likely atelectasis or scarring.  There are few small pulmonary nodules which were not seen in 2017.  For example right middle lobe image 51 series 12 measures only 3 mm.  Left upper lobe image 49 series 12 also measures about 3 mm.    Upper abdomen discussed on recent prior CT of the abdomen.  No acute osseous findings.  Impression: Small indeterminate pulmonary nodules which were not seen in 2017.  These can be followed on surveillance scans.    Electronically signed by: Vincent Baeza  Date:    05/14/2024  Time:    09:15         Medication List        ASK your doctor about these medications      traZODone 100 MG tablet  Commonly known as: DESYREL               Explained in detail to the patient about the discharge plan, medications, and follow-up visits. Pt understands and agrees with the treatment plan  Discharge Disposition: Home or  Self Care   Discharged Condition: stable  Diet-   Dietary Orders (From admission, onward)       Start     Ordered    05/14/24 1024  Diet Clear Liquid  Diet effective now        Comments: No red dye    05/14/24 1024                   Medications Per DC med rec  Activities as tolerated    For further questions contact hospitalist office    Discharge time 33 minutes    For worsening symptoms, chest pain, shortness of breath, increased abdominal pain, high grade fever, stroke or stroke like symptoms, immediately go to the nearest Emergency Room or call 911 as soon as possible.      Neha Montoya M.D on 5/14/2024. at 2:44 PM.

## 2024-05-14 NOTE — PLAN OF CARE
05/14/24 1121   Discharge Assessment   Assessment Type Discharge Planning Assessment   Confirmed/corrected address, phone number and insurance Yes   Confirmed Demographics Correct on Facesheet   Source of Information patient   Communicated EVAN with patient/caregiver Yes  (5/14/24)   Reason For Admission Lung cancer   People in Home parent(s)   Do you expect to return to your current living situation? Yes   Do you have help at home or someone to help you manage your care at home? Yes   Who are your caregiver(s) and their phone number(s)? Carolina (daughter) [phone]   Prior to hospitilization cognitive status: Unable to Assess   Current cognitive status: Alert/Oriented   Walking or Climbing Stairs Difficulty no   Dressing/Bathing Difficulty no   Home Accessibility wheelchair accessible   Home Layout Able to live on 1st floor   Equipment Currently Used at Home none   Patient currently being followed by outpatient case management? No   Do you currently have service(s) that help you manage your care at home? No   Do you take prescription medications? Yes   Do you have prescription coverage? Yes   Coverage [employer]   Who is going to help you get home at discharge? Daughter   How do you get to doctors appointments? car, drives self   Are you on dialysis? No   Do you take coumadin? No   Discharge Plan A Home   Discharge Plan B Home with family   DME Needed Upon Discharge  none   Discharge Plan discussed with: Patient;Adult children   Transition of Care Barriers None   OTHER   Name(s) of People in Home Lives with her father

## 2024-05-14 NOTE — H&P
[facility] Hospital Medicine History & Physical Examination       Patient Name: Shira Nair  MRN: [id number]  Patient Class: IP- Inpatient   Admission Date: 5/13/2024  7:37 PM  Length of Stay: 1  Admitting Service: Hospital Medicine   Attending Physician: Lance Ureña MD   Primary Care Provider: Amado Baig III, MD  History source: EMR, patient and/or patient's family    CHIEF COMPLAINT   Transferred for abnormal CT abdomen    HISTORY OF PRESENT ILLNESS:   Patient is a 54-year-old female with a history of cervical cancer over 15 years ago she reported was resected surgically and was local, followed by a total abdominal hysterectomy with no reported recurrence.  She also has a history of a brain tumor that was biopsied and found to be benign but require placement of a  shunt back in the 1980s.  She also had a benign thyroid tumor resected 8 years ago.  She has a history of opioid dependence but has been off of long-acting opiates for quite some time and not using drugs per her report.  [facility] with complaint of abdominal pain x1 month and CT of the abdomen showed metastatic appearing liver lesions.  For this reason she was transferred here for an oncology evaluation.  Patient reports she was up-to-date on her mammograms and colonoscopy all within the last 3 years and negative.    PAST MEDICAL HISTORY:   Localized cervical cancer 15 years ago s/p resection/MARIN  Benign brain tumor 1983, status post  shunt  Benign thyroid tumor resected 8 years ago  Opioid use disorder, not currently actively using     PAST SURGICAL HISTORY:     Past Surgical History:   Procedure Laterality Date    BRAIN SURGERY      CHOLECYSTECTOMY      HYSTERECTOMY         ALLERGIES:   Ketorolac, Tramadol, and Vancomycin analogues    FAMILY HISTORY:   Reviewed and non-contributory     SOCIAL HISTORY:   Screening for Social Drivers for health: Patient screened for  "food insecurity, housing instability, transportation needs, utility   difficulties, and interpersonal safety (select all that apply as identified as concern)  []Housing or Food  []Transportation Needs  []Utility Difficulties  []Interpersonal safety  [x]None  Social History     Tobacco Use    Smoking status: Former     Types: Cigarettes    Smokeless tobacco: Never   Substance Use Topics    Alcohol use: Never        HOME MEDICATIONS:     Prior to Admission medications    Medication Sig Start Date End Date Taking? Authorizing Provider   traZODone (DESYREL) 100 MG tablet Take 200 mg by mouth every evening.   Yes Provider, Historical       REVIEW OF SYSTEMS:   Except as documented, all other systems reviewed and negative     PHYSICAL EXAM:   T 98.8 °F (37.1 °C)   /66   P 105   RR 18   O2 97 %  GENERAL: awake, alert, oriented and in no acute distress, non-toxic appearing   HEENT: normocephalic atraumatic   NECK: supple   LUNGS: Clear bilaterally, no wheezing or rales, no accessory muscle use   CVS: Regular rate and rhythm, normal peripheral perfusion  ABD: Soft, non-tender, non-distended, bowel sounds present  EXTREMITIES: no clubbing or cyanosis  SKIN: Warm, dry.   NEURO: alert and oriented, grossly without focal deficits   PSYCHIATRIC: Cooperative    LABS AND IMAGING:     Recent Labs     05/13/24  1056   WBC 16.38*   RBC 4.77   HGB 12.3   HCT 38.1   MCV 79.9   MCH 25.8*   MCHC 32.3   RDW 13.8        No results for input(s): "LACTIC" in the last 72 hours.  No results for input(s): "INR", "APTT", "D-DIMER" in the last 72 hours.  No results for input(s): "HGBA1C", "CHOL", "TRIG", "LDL", "VLDL", "HDL" in the last 72 hours.   Recent Labs     05/13/24  1056   *   K 3.7   CHLORIDE 95*   CO2 29   BUN 22*   CREATININE 1.45*   GLUCOSE 258*   CALCIUM 10.2   MG 2.30   ALBUMIN 4.4   GLOBULIN 3.9   ALKPHOS 579*   ALT 30   AST 45*   BILITOT 1.7*   LIPASE <20*     No results for input(s): "BNP", "CPK", "TROPONINI" in " "the last 72 hours.       CT Abdomen Pelvis With IV Contrast NO Oral Contrast  Narrative: EXAMINATION:  CT ABDOMEN PELVIS WITH IV CONTRAST    CLINICAL HISTORY:  Non-small cell lung cancer (NSCLC), staging;    TECHNIQUE:  Low dose axial images, sagittal and coronal reformations were obtained from the lung bases to the pubic symphysis following the IV administration of 90 mL of Omnipaque 300 no oral contrast was given.    Automatic exposure control (AEC) was utilized for dose reduction.    Dose: 403 mGycm    COMPARISON:  01/03/2024    FINDINGS:  There are mild atelectatic changes in the left lung base.  There are lesions throughout the liver highly suspicious for metastatic disease.  The largest measures 5.8 x 7 cm.  Spleen appears normal.  Pancreas appears normal.  The adrenals are not enlarged.  Kidneys appear normal.  Aorta shows no evidence of an aneurysm.  The appendix appears normal.  Small bowel is not dilated  Impression: Changes most consistent with metastatic disease to the liver.    Electronically signed by: Javi Trujillo MD  Date:    05/13/2024  Time:    13:41      ASSESSMENT & PLAN:   Metastatic appearing liver lesions, uncertain etiology    Hx:  Benign brain tumor,  shunt, cervical cancer 15 years ago, benign thyroid tumor, opioid use disorder/dependence history    -obtain CT of the chest with and without contrast and CT  -if a primary lesion is identified on above, will arrange an outpatient biopsy  -confronted patient about refusing oral oxycodone ordered and asking for IV pain medication instead.  Acknowledge her history of opioid use disorder and explained to her the behavior is inappropriate to refuse medication offered stating "it will not work" and asking to go straight to IV medication.  She expressed understanding, warned her to focus on her mental health and be mindful of the potential for drug-seeking behavior while this is being worked up.  She will proceed with oral Oxycodone 1st, if this " does not work will consider proceeding to IV pain medication.  Patient was cooperative and understanding.    DVT prophylaxis:  Lovenox  Code status:  Full code    If patient was admitted under observational status it is with my approval/permission.     At least 55 min was spent on this history and physical.  Time seen: 10PM   Lance Ureña MD

## 2024-05-14 NOTE — NURSING
Nurses Note -- 4 Eyes      5/13/2024   8:17 PM      Skin assessed during: Admit      [x] No Altered Skin Integrity Present    []Prevention Measures Documented      [] Yes- Altered Skin Integrity Present or Discovered   [] LDA Added if Not in Epic (Describe Wound)   [] New Altered Skin Integrity was Present on Admit and Documented in LDA   [] Wound Image Taken    Wound Care Consulted? No    Attending Nurse:  Romy Raymundo RN/Staff Member:   Polina Cobos CNA

## 2024-05-14 NOTE — CONSULTS
Inpatient Nutrition Assessment    Admit Date: 5/13/2024   Total duration of encounter: 1 day   Patient Age: 54 y.o.    Nutrition Recommendation/Prescription     Diet Clear Liquid ordered, advance as medically feasible  Encouraged adequate PO intake  Consider adding appetite stimulant if appetite and PO intake remains decreased  Monitor appetite/PO intake, weight, and labs    Communication of Recommendations: reviewed with patient and reviewed with family    Nutrition Assessment     Malnutrition Assessment/Nutrition-Focused Physical Exam    Malnutrition Context: acute illness or injury (05/14/24 1400)  Malnutrition Level: moderate (05/14/24 1400)  Energy Intake (Malnutrition): less than 75% for greater than 7 days (05/14/24 1400)     Subcutaneous Fat (Malnutrition): mild depletion (05/14/24 1400)  Orbital Region (Subcutaneous Fat Loss): mild depletion  Upper Arm Region (Subcutaneous Fat Loss): mild depletion     Muscle Mass (Malnutrition): other (see comments) (Does not meet criteria) (05/14/24 1400)                          Fluid Accumulation (Malnutrition): other (see comments) (Does not meet criteria) (05/14/24 1400)        A minimum of two characteristics is recommended for diagnosis of either severe or non-severe malnutrition.    Chart Review    Reason Seen: malnutrition screening tool (MST) and physician consult for wt loss    Malnutrition Screening Tool Results   Have you recently lost weight without trying?: Yes: 2-13 lbs  Have you been eating poorly because of a decreased appetite?: Yes   MST Score: 2   Diagnosis:  Metastatic appearing liver lesions, uncertain etiology     Hx:  Benign brain tumor,  shunt, cervical cancer 15 years ago, benign thyroid tumor, opioid use disorder/dependence history    Relevant Medical History:   Localized cervical cancer 15 years ago s/p resection/MARIN  Benign brain tumor 1983, status post  shunt  Benign thyroid tumor resected 8 years ago  Opioid use disorder, not currently  "actively using     Scheduled Medications:  traZODone, 200 mg, QHS    Continuous Infusions:   PRN Medications:  acetaminophen, 650 mg, Q8H PRN  acetaminophen, 650 mg, Q8H PRN  melatonin, 6 mg, Nightly PRN  morphine, 2 mg, Q4H PRN  ondansetron, 4 mg, Q8H PRN  oxyCODONE, 5 mg, Q4H PRN  sodium chloride 0.9%, 10 mL, PRN    Calorie Containing IV Medications: no significant kcals from medications at this time    Recent Labs   Lab 24  1056 24  0320   * 138   K 3.7 3.4*   CALCIUM 10.2 9.1   MG 2.30 2.10   CHLORIDE 95* 105   CO2 29 26   BUN 22* 13.9   CREATININE 1.45* 0.82   EGFRNORACEVR 43 >60   GLUCOSE 258* 114*   BILITOT 1.7* 0.6   ALKPHOS 579* 459*   ALT 30 12   AST 45* 18   ALBUMIN 4.4 2.6*   LIPASE <20*  --    AMYLASE 50  --    WBC 16.38* 10.75   HGB 12.3 9.8*   HCT 38.1 30.2*     Nutrition Orders:  Diet Clear Liquid      Appetite/Oral Intake: not applicable/not applicable  Factors Affecting Nutritional Intake: decreased appetite  Social Needs Impacting Access to Food: none identified  Food/[place]/Cultural Preferences: none reported  Food Allergies: none reported  Last Bowel Movement: 24  Wound(s):  none noted    Comments    2024: Pt reports a fair appetite/PO intake prior to admit >3 weeks prior to admit. Pt denies PO intake attempt at time of visit. Pt denies N/V/D/C and chewing/swallowing difficulties. Last BM noted. Pt denies unplanned wt loss, reports UBW as 68.1-72.7 kg. No pert wt hx per EMR. Pt declined ONS. Encouraged adequate PO intake. Pt may benefit from appetite stimulant. Will monitor.    Anthropometrics    Height: 5' 10" (177.8 cm),    Last Weight: 70.2 kg (154 lb 12.2 oz) (24), Weight Method: Standard Scale  BMI (Calculated): 22.2  BMI Classification: normal (BMI 18.5-24.9)     Ideal Body Weight (IBW), Female: 150 lb     % Ideal Body Weight, Female (lb): 103.17 %                    Usual Body Weight (UBW), k.1 kg  % Usual Body Weight: 103.3     Usual Weight " Provided By: patient    Wt Readings from Last 5 Encounters:   05/13/24 70.2 kg (154 lb 12.2 oz)   05/13/24 69.4 kg (153 lb)     Weight Change(s) Since Admission:   5/13/2024: 70.2 kg  Wt Readings from Last 1 Encounters:   05/13/24 1959 70.2 kg (154 lb 12.2 oz)   Admit Weight: 70.2 kg (154 lb 12.2 oz) (05/13/24 1959), Weight Method: Standard Scale    Estimated Needs    Weight Used For Calorie Calculations: 70.2 kg (154 lb 12.2 oz)  Energy Calorie Requirements (kcal): 2106 (30 kcal/kg)  Energy Need Method: Kcal/kg  Weight Used For Protein Calculations: 70.2 kg (154 lb 12.2 oz)  Protein Requirements: 84 (1.2 g/kg)  Fluid Requirements (mL): 2106 (1 mL/kcal)        Enteral Nutrition     Patient not receiving enteral nutrition at this time.    Parenteral Nutrition     Patient not receiving parenteral nutrition support at this time.    Evaluation of Received Nutrient Intake    Calories: not meeting estimated needs  Protein: not meeting estimated needs    Patient Education     Not applicable.    Nutrition Diagnosis     PES: Increased nutrient needs ( kcal and protein) related to chronic illness as evidenced by increased nutrient demand. (new)     PES: Moderate acute disease or injury related malnutrition related to acute illness as evidenced by less than 75% needs met for greater than 7 days and mild fat depletion. (new)    Nutrition Interventions     Intervention(s): general/healthful diet    Goal: Consume % of meals/snacks by follow-up. (new)  Goal: Maintain weight throughout hospitalization. (new)    Nutrition Goals & Monitoring     Dietitian will monitor: food and beverage intake, weight, electrolyte/renal panel, glucose/endocrine profile, and gastrointestinal profile  Discharge planning: too early to determine; pending clinical course  Nutrition Risk/Follow-Up: moderate (follow-up in 3-5 days)   Please consult if re-assessment needed sooner.

## 2024-05-14 NOTE — CONSULTS
Consult Note    Reason for Consult:      We were consulted by Dr. Ureña to evaluate this patient for diagnostic EGD and colonoscopy to r/o primary GI malignancy.     HPI:     54-year-old female unknown to our group with PMH of cervical cancer s/p surgical resection and total abdominal hysterectomy with no reported recurrence, benign brain tumor s/p  shunt in 1980s, benign thyroid tumor s/p resection 8 years ago, opioid dependence presented to ED with complaints of abdominal pain X 1 month.    On presentation, VSS and afebrile.  Labs notable for leukocytosis 16.38, BUN 22/creatinine 1.45, glucose 258, alk-phos 579, AST 45, T bili 1.7, ALT 30, lipase less than 20.  Imaging notable for CT abdomen/pelvis W/IV contrast showing lesions throughout the liver highly suspicious for metastatic disease.  Largest measures 5.8 x 7 cm.  Spleen, pancreas, adrenals, kidneys normal.  Small bowel not dilated.  Changes most consistent with metastatic disease to liver.  Tumor markers drawn and , AFP less than 2,  58, CA 19-9 302.  Patient admitted for further workup.  GI consulted for possible EGD and colonoscopy to rule out primary GI cancer.    Further imaging obtained to define primary cancer.  CT chest W/O contrast showing small indeterminate pulmonary nodules not seen on exam in 2017.  CT head W/O contrast performed and results pending.    Reports intermittent abdominal pain in RLQ and RUQ described as sharp and taking her breath away.  Nothing exacerbates or improves pain.  Otherwise no abdominal pain.  Denies acid reflux, dysphagia, odynophagia, early satiety, change in bowel habits, diarrhea, constipation, change in caliber of stool, hematochezia, melena.  Does admit to some nausea and vomiting for the past month that is food product and bile.  Denies hematemesis.  Denies EtOH or tobacco use currently or previously.    Admits to 5-6 lb weight loss in the past 2 months which she attributes to decreased appetite.   Brother with history of pancreatic cancer in his 50s otherwise no family history of GI neoplasia.  Patient had negative Cologuard 2-3 years ago ordered by PCP.  Denies previous colonoscopy or EGD.  No blood thinners.  Denies night sweats, fever, chills.    Previous records reviewed. Collateral information obtained from family member present at bedside.    PCP:  Amado Baig III, MD    Review of patient's allergies indicates:   Allergen Reactions    Ketorolac Hives    Tramadol Hives    Vancomycin analogues Hives        Current Facility-Administered Medications   Medication Dose Route Frequency Provider Last Rate Last Admin    acetaminophen tablet 650 mg  650 mg Oral Q8H PRN Lance Ureña MD        acetaminophen tablet 650 mg  650 mg Oral Q8H PRN Lance Ureña MD        melatonin tablet 6 mg  6 mg Oral Nightly PRN Lance Ureña MD   6 mg at 05/13/24 2208    morphine injection 2 mg  2 mg Intravenous Q4H PRLance Castro MD   2 mg at 05/14/24 0058    ondansetron injection 4 mg  4 mg Intravenous Q8H PRN Lance Ureña MD   4 mg at 05/14/24 0936    oxyCODONE immediate release tablet 5 mg  5 mg Oral Q4H PRN Lance Ureña MD   5 mg at 05/14/24 0729    potassium chloride SA CR tablet 40 mEq  40 mEq Oral Q2H Neha Stevens MD   40 mEq at 05/14/24 0931    sodium chloride 0.9% flush 10 mL  10 mL Intravenous PRN Lance Ureña MD        traZODone tablet 200 mg  200 mg Oral QHS Lance Ureña MD   200 mg at 05/14/24 0118     Medications Prior to Admission   Medication Sig Dispense Refill Last Dose    traZODone (DESYREL) 100 MG tablet Take 200 mg by mouth every evening.   5/12/2024       Past Medical History:  Past Medical History:   Diagnosis Date    Brain tumor     Cervical ca     Depression     Opioid abuse       Past Surgical History:  Past Surgical History:   Procedure Laterality Date    BRAIN SURGERY      CHOLECYSTECTOMY      HYSTERECTOMY         Family History:  No family history on file.  Social History:  Social History     Tobacco Use    Smoking status: Former     Types: Cigarettes    Smokeless tobacco: Never   Substance Use Topics    Alcohol use: Never       Review of Systems:     Review of Systems   Constitutional:  Negative for appetite change, chills, diaphoresis, fatigue, fever and unexpected weight change.   HENT:  Negative for trouble swallowing.    Respiratory:  Negative for cough, choking, chest tightness and shortness of breath.    Cardiovascular:  Negative for chest pain, palpitations and leg swelling.   Gastrointestinal:  Positive for abdominal pain (RLQ, RUQ), nausea and vomiting. Negative for abdominal distention, blood in stool, constipation, diarrhea and rectal pain.   Skin:  Negative for color change and pallor.   Neurological:  Negative for dizziness, syncope, weakness and light-headedness.   Psychiatric/Behavioral:  Negative for confusion.        Objective:     VITAL SIGNS: 24 HR MIN & MAX LAST    Temp  Min: 97.6 °F (36.4 °C)  Max: 98.8 °F (37.1 °C)  98.6 °F (37 °C)        BP  Min: 85/53  Max: 141/85  (!) 95/57     Pulse  Min: 67  Max: 107  83     Resp  Min: 18  Max: 20  18    SpO2  Min: 94 %  Max: 99 %  96 %        Intake/Output Summary (Last 24 hours) at 5/14/2024 1019  Last data filed at 5/14/2024 0622  Gross per 24 hour   Intake 360 ml   Output --   Net 360 ml       Physical Exam  Constitutional:       General: She is not in acute distress.     Appearance: She is not ill-appearing.   HENT:      Head: Normocephalic and atraumatic.   Eyes:      General: No scleral icterus.     Extraocular Movements: Extraocular movements intact.   Cardiovascular:      Rate and Rhythm: Normal rate and regular rhythm.   Pulmonary:      Effort: Pulmonary effort is normal. No respiratory distress.   Abdominal:      General: Bowel sounds are normal. There is no distension.      Palpations: Abdomen is soft. There is no mass.      Tenderness: There is no  abdominal tenderness. There is no guarding or rebound.   Musculoskeletal:      Right lower leg: No edema.      Left lower leg: No edema.   Skin:     General: Skin is warm and dry.      Coloration: Skin is not jaundiced.   Neurological:      Mental Status: She is alert and oriented to person, place, and time.   Psychiatric:         Mood and Affect: Mood normal.         Behavior: Behavior normal.           Recent Results (from the past 48 hour(s))   Urinalysis    Collection Time: 05/13/24 10:40 AM   Result Value Ref Range    Color, UA Yellow Yellow, Light-Yellow, Dark Yellow, Ladi, Straw    Appearance, UA Clear Clear    Specific Gravity, UA >=1.030 1.005 - 1.030    pH, UA 6.0 5.0 - 8.5    Protein, UA >=300 (A) Negative    Glucose,  (A) Negative, Normal    Ketones, UA Trace (A) Negative    Blood, UA Small (A) Negative    Bilirubin, UA Moderate (A) Negative    Urobilinogen, UA 4.0 (A) 0.2, 1.0, Normal    Nitrites, UA Positive (A) Negative    Leukocyte Esterase, UA Trace (A) Negative   Urinalysis, Microscopic    Collection Time: 05/13/24 10:40 AM   Result Value Ref Range    Bacteria, UA Moderate (A) None Seen, Rare, Occasional /HPF    Granular Casts, UA Few (A) None Seen /LPF    RBC, UA 0-5 None Seen, 0-2, 3-5, 0-5 /HPF    WBC, UA 6-10 (A) None Seen, 0-2, 3-5, 0-5 /HPF    Squamous Epithelial Cells, UA Few (A) None Seen, Rare, Occasional, Occ /HPF   Urine culture    Collection Time: 05/13/24 10:40 AM    Specimen: Urine, Clean Catch   Result Value Ref Range    Urine Culture No Growth At 24 Hours    Comprehensive metabolic panel    Collection Time: 05/13/24 10:56 AM   Result Value Ref Range    Sodium 135 (L) 136 - 145 mmol/L    Potassium 3.7 3.5 - 5.1 mmol/L    Chloride 95 (L) 98 - 110 mmol/L    CO2 29 21 - 32 mmol/L    Glucose 258 (H) 70 - 115 mg/dL    Blood Urea Nitrogen 22 (H) 7.0 - 20.0 mg/dL    Creatinine 1.45 (H) 0.66 - 1.25 mg/dL    Calcium 10.2 8.4 - 10.2 mg/dL    Protein Total 8.3 (H) 6.3 - 8.2 gm/dL     Albumin 4.4 3.4 - 5.0 g/dL    Globulin 3.9 2.0 - 3.9 gm/dL    Albumin/Globulin Ratio 1.1 ratio    Bilirubin Total 1.7 (H) 0.0 - 1.0 mg/dL     (H) 50 - 144 unit/L    ALT 30 1 - 45 unit/L    AST 45 (H) 14 - 36 unit/L    eGFR 43 mL/min/1.73/m2    Anion Gap 11.0 2.0 - 13.0 mEq/L    BUN/Creatinine Ratio 15 12 - 20   Magnesium    Collection Time: 05/13/24 10:56 AM   Result Value Ref Range    Magnesium Level 2.30 1.80 - 2.40 mg/dL   Amylase    Collection Time: 05/13/24 10:56 AM   Result Value Ref Range    Amylase Level 50 30 - 100 unit/L   Lipase    Collection Time: 05/13/24 10:56 AM   Result Value Ref Range    Lipase Level <20 (L) 23 - 300 U/L   hCG, quantitative, pregnancy    Collection Time: 05/13/24 10:56 AM   Result Value Ref Range    Beta HCG Quant <2.39 mIU/mL   CBC with Differential    Collection Time: 05/13/24 10:56 AM   Result Value Ref Range    WBC 16.38 (H) 4.00 - 11.50 x10(3)/mcL    RBC 4.77 4.00 - 5.10 x10(6)/mcL    Hgb 12.3 11.8 - 16.0 g/dL    Hct 38.1 36.0 - 48.0 %    MCV 79.9 79.0 - 99.0 fL    MCH 25.8 (L) 27.0 - 34.0 pg    MCHC 32.3 31.0 - 37.0 g/dL    RDW 13.8 11.0 - 14.5 %    Platelet 275 140 - 371 x10(3)/mcL    MPV 9.5 9.4 - 12.4 fL    Neut % 87.4 (H) 37 - 73 %    Lymph % 4.0 (L) 20 - 55 %    Mono % 7.8 4.7 - 12.5 %    Eos % 0.1 (L) 0.7 - 7 %    Basophil % 0.2 0.1 - 1.2 %    Lymph # 0.66 (L) 1.16 - 3.74 x10(3)/mcL    Neut # 14.31 (H) 1.56 - 6.13 x10(3)/mcL    Mono # 1.28 (H) 0.24 - 0.36 x10(3)/mcL    Eos # 0.01 (L) 0.04 - 0.36 x10(3)/mcL    Baso # 0.04 0.01 - 0.08 x10(3)/mcL    IG# 0.08 (H) 0.0001 - 0.031 x10(3)/mcL    IG% 0.5 0 - 0.5 %    NRBC% 0.0 <=1 %   AFP Tumor Marker    Collection Time: 05/14/24  3:20 AM   Result Value Ref Range    Alpha Fetoprotein Level <2.00 <=8.90 ng/mL   CEA (in-house)    Collection Time: 05/14/24  3:20 AM   Result Value Ref Range    Carcinoembryonic Antigen 546.58 (H) 0.00 - 3.00 ng/mL   Cancer Antigen 19-9    Collection Time: 05/14/24  3:20 AM   Result Value Ref  Range    CA 19-9 302.04 (H) 0.00 - 37.00 unit/mL       Collection Time: 05/14/24  3:20 AM   Result Value Ref Range    Cancer Antigen 125 58.4 (H) 0.0 - 35.0 unit/mL   Comprehensive metabolic panel    Collection Time: 05/14/24  3:20 AM   Result Value Ref Range    Sodium 138 136 - 145 mmol/L    Potassium 3.4 (L) 3.5 - 5.1 mmol/L    Chloride 105 98 - 107 mmol/L    CO2 26 22 - 29 mmol/L    Glucose 114 (H) 74 - 100 mg/dL    Blood Urea Nitrogen 13.9 9.8 - 20.1 mg/dL    Creatinine 0.82 0.55 - 1.02 mg/dL    Calcium 9.1 8.4 - 10.2 mg/dL    Protein Total 6.4 6.4 - 8.3 gm/dL    Albumin 2.6 (L) 3.5 - 5.0 g/dL    Globulin 3.8 (H) 2.4 - 3.5 gm/dL    Albumin/Globulin Ratio 0.7 (L) 1.1 - 2.0 ratio    Bilirubin Total 0.6 <=1.5 mg/dL     (H) 40 - 150 unit/L    ALT 12 0 - 55 unit/L    AST 18 5 - 34 unit/L    eGFR >60 mL/min/1.73/m2   CBC with Differential    Collection Time: 05/14/24  3:20 AM   Result Value Ref Range    WBC 10.75 4.50 - 11.50 x10(3)/mcL    RBC 3.76 (L) 4.20 - 5.40 x10(6)/mcL    Hgb 9.8 (L) 12.0 - 16.0 g/dL    Hct 30.2 (L) 37.0 - 47.0 %    MCV 80.3 80.0 - 94.0 fL    MCH 26.1 (L) 27.0 - 31.0 pg    MCHC 32.5 (L) 33.0 - 36.0 g/dL    RDW 14.2 11.5 - 17.0 %    Platelet 254 130 - 400 x10(3)/mcL    MPV 10.2 7.4 - 10.4 fL    Neut % 70.6 %    Lymph % 16.3 %    Mono % 11.4 %    Eos % 0.9 %    Basophil % 0.3 %    Lymph # 1.75 0.6 - 4.6 x10(3)/mcL    Neut # 7.59 2.1 - 9.2 x10(3)/mcL    Mono # 1.23 0.1 - 1.3 x10(3)/mcL    Eos # 0.10 0 - 0.9 x10(3)/mcL    Baso # 0.03 <=0.2 x10(3)/mcL    IG# 0.05 (H) 0 - 0.04 x10(3)/mcL    IG% 0.5 %    NRBC% 0.0 %   Magnesium    Collection Time: 05/14/24  3:20 AM   Result Value Ref Range    Magnesium Level 2.10 1.60 - 2.60 mg/dL       CT Chest Without Contrast    Result Date: 5/14/2024  EXAMINATION: CT CHEST WITHOUT CONTRAST CLINICAL HISTORY: metastatic eval; TECHNIQUE: Helical acquisition through the chest performed without IV contrast. Three plane reconstructions made available for  review.  mGycm. Automatic exposure control, adjustment of mA/kV or iterative reconstruction technique was used to reduce radiation. COMPARISON: 30 October 2017 FINDINGS: There is no mediastinal, hilar or axillary lymphadenopathy by size criteria. The heart is not enlarged.  Mild coronary artery calcifications.  No pericardial effusion. There is no pleural effusion.  Linear opacities towards the lung bases most likely atelectasis or scarring.  There are few small pulmonary nodules which were not seen in 2017.  For example right middle lobe image 51 series 12 measures only 3 mm.  Left upper lobe image 49 series 12 also measures about 3 mm. Upper abdomen discussed on recent prior CT of the abdomen.  No acute osseous findings.     Small indeterminate pulmonary nodules which were not seen in 2017.  These can be followed on surveillance scans. Electronically signed by: Vincent Baeza Date:    05/14/2024 Time:    09:15    CT Abdomen Pelvis With IV Contrast NO Oral Contrast    Result Date: 5/13/2024  EXAMINATION: CT ABDOMEN PELVIS WITH IV CONTRAST CLINICAL HISTORY: Non-small cell lung cancer (NSCLC), staging; TECHNIQUE: Low dose axial images, sagittal and coronal reformations were obtained from the lung bases to the pubic symphysis following the IV administration of 90 mL of Omnipaque 300 no oral contrast was given. Automatic exposure control (AEC) was utilized for dose reduction. Dose: 403 mGycm COMPARISON: 01/03/2024 FINDINGS: There are mild atelectatic changes in the left lung base.  There are lesions throughout the liver highly suspicious for metastatic disease.  The largest measures 5.8 x 7 cm.  Spleen appears normal.  Pancreas appears normal.  The adrenals are not enlarged.  Kidneys appear normal.  Aorta shows no evidence of an aneurysm.  The appendix appears normal.  Small bowel is not dilated     Changes most consistent with metastatic disease to the liver. Electronically signed by: Javi Trujillo MD  Date:    05/13/2024 Time:    13:41      Imaging personally reviewed by myself and SP.    Assessment / Plan:     54-year-old female unknown to our group with PMH of cervical cancer s/p surgical resection and total abdominal hysterectomy with no reported recurrence, benign brain tumor s/p  shunt in 1980s, benign thyroid tumor s/p resection 8 years ago, opioid dependence presented to ED with complaints of abdominal pain X 1 month.  Found to have multiple metastatic appearing liver lesions with elevated , CA-125 58, and CA 19-9 302.  CT chest with multiple pulmonary nodules.  CT head pending.  Admitted for further workup.  GI consulted to consider EGD colonoscopies rule out primary GI malignancy.    Liver masses  Metastatic appearing  2.  Elevated CEA  CEA: 546  3.  Elevated CA 19-9  CA 19-9: 302  4.  Family hx of pancreatic cancer  Brother with pancreatic CA in 50s    - okay for clear liquid diet today  - possible EGD and colonoscopy tomorrow to r/o primary GI malignancy pending discussion with MD. Will discuss with MD.    Thank you for allowing us to participate in this patient's care.

## 2024-05-15 LAB — BACTERIA UR CULT: NORMAL

## 2024-05-17 ENCOUNTER — PATIENT MESSAGE (OUTPATIENT)
Dept: ADMINISTRATIVE | Facility: OTHER | Age: 54
End: 2024-05-17
Payer: MEDICARE

## 2024-05-23 ENCOUNTER — HOSPITAL ENCOUNTER (OUTPATIENT)
Dept: RADIOLOGY | Facility: HOSPITAL | Age: 54
Discharge: HOME | End: 2024-05-23
Attending: INTERNAL MEDICINE
Payer: MEDICARE

## 2024-05-23 VITALS
HEART RATE: 76 BPM | BODY MASS INDEX: 22.25 KG/M2 | RESPIRATION RATE: 18 BRPM | TEMPERATURE: 98 F | OXYGEN SATURATION: 96 % | DIASTOLIC BLOOD PRESSURE: 79 MMHG | HEIGHT: 70 IN | WEIGHT: 155.44 LBS | SYSTOLIC BLOOD PRESSURE: 110 MMHG

## 2024-05-23 DIAGNOSIS — R16.0 LIVER MASS: ICD-10-CM

## 2024-05-23 LAB
INR PPP: 1.1
PROTHROMBIN TIME: 14.5 SECONDS (ref 12.5–14.5)

## 2024-05-23 PROCEDURE — 25000003 PHARM REV CODE 250: Performed by: RADIOLOGY

## 2024-05-23 PROCEDURE — 88161 CYTOPATH SMEAR OTHER SOURCE: CPT

## 2024-05-23 PROCEDURE — 36415 COLL VENOUS BLD VENIPUNCTURE: CPT | Performed by: RADIOLOGY

## 2024-05-23 PROCEDURE — 88307 TISSUE EXAM BY PATHOLOGIST: CPT | Performed by: FAMILY MEDICINE

## 2024-05-23 PROCEDURE — 85610 PROTHROMBIN TIME: CPT | Performed by: RADIOLOGY

## 2024-05-23 PROCEDURE — 47000 NEEDLE BIOPSY OF LIVER PERQ: CPT

## 2024-05-23 RX ORDER — LIDOCAINE HYDROCHLORIDE 20 MG/ML
INJECTION, SOLUTION INFILTRATION; PERINEURAL
Status: COMPLETED | OUTPATIENT
Start: 2024-05-23 | End: 2024-05-23

## 2024-05-23 RX ORDER — HYDROCODONE BITARTRATE AND ACETAMINOPHEN 10; 325 MG/1; MG/1
1 TABLET ORAL EVERY 8 HOURS PRN
COMMUNITY
Start: 2024-05-20

## 2024-05-23 RX ORDER — BUSPIRONE HYDROCHLORIDE 10 MG/1
10 TABLET ORAL 3 TIMES DAILY PRN
COMMUNITY
Start: 2024-05-15 | End: 2024-06-13

## 2024-05-23 RX ADMIN — LIDOCAINE HYDROCHLORIDE 5 ML: 20 INJECTION, SOLUTION INFILTRATION; PERINEURAL at 01:05

## 2024-05-23 NOTE — DISCHARGE SUMMARY
Radiology Post-Procedure Note    Pre Op Diagnosis: right liver mass    Post Op Diagnosis: right liver mass    Procedure: right liver biopsy    Procedure performed by: Ronnie    Written Informed Consent Obtained: Yes    Specimen Removed: YES     Estimated Blood Loss: Minimal    Findings:   Standard CT Liver Biopsy    Patient tolerated procedure well.    @SIG@

## 2024-05-23 NOTE — H&P
Radiology History & Physical      SUBJECTIVE:     Chief Complaint: Liver Lesion    History of Present Illness:  Shira Nair is a 54 y.o. female who presents for Biopsy  Past Medical History:   Diagnosis Date    Brain tumor     Cervical ca     Depression     Opioid abuse      Past Surgical History:   Procedure Laterality Date    BRAIN SURGERY      CHOLECYSTECTOMY      HYSTERECTOMY         Home Meds:   Prior to Admission medications    Medication Sig Start Date End Date Taking? Authorizing Provider   busPIRone (BUSPAR) 10 MG tablet Take 10 mg by mouth 3 (three) times daily as needed (anxiety). 5/15/24  Yes Provider, Historical   HYDROcodone-acetaminophen (NORCO)  mg per tablet Take 1 tablet by mouth every 8 (eight) hours as needed. 5/20/24  Yes Provider, Historical   traZODone (DESYREL) 100 MG tablet Take 2 tablets by mouth every evening.   Yes Provider, Historical     Anticoagulants/Antiplatelets: no anticoagulation    Allergies:   Review of patient's allergies indicates:   Allergen Reactions    Ketorolac Hives    Tramadol Hives    Vancomycin analogues Hives     Sedation History:  no adverse reactions    Review of Systems:   Hematological: no known coagulopathies  Respiratory: no shortness of breath  Cardiovascular: no chest pain  Gastrointestinal: no abdominal pain  Genito-Urinary: no dysuria  Musculoskeletal: negative  Neurological: no TIA or stroke symptoms         OBJECTIVE:     Vital Signs (Most Recent)  Temp: 97.9 °F (36.6 °C) (05/23/24 0915)  Pulse: 80 (05/23/24 0915)  BP: 104/70 (05/23/24 0915)  SpO2: 96 % (05/23/24 0915)    Physical Exam:  ASA: 2    General: no acute distress  Mental Status: alert and oriented to person, place and time  HEENT: normocephalic, atraumatic  Chest: unlabored breathing  Heart: regular heart rate  Abdomen: nondistended  Extremity: moves all extremities    Laboratory  Lab Results   Component Value Date    INR 1.1 05/23/2024       Lab Results   Component Value Date     WBC 10.75 05/14/2024    HGB 9.8 (L) 05/14/2024    HCT 30.2 (L) 05/14/2024    MCV 80.3 05/14/2024     05/14/2024      Lab Results   Component Value Date    GLU 91 02/10/2006     05/14/2024    K 3.4 (L) 05/14/2024     02/10/2006    CO2 26 05/14/2024    BUN 13.9 05/14/2024    CREATININE 0.82 05/14/2024    CALCIUM 9.1 05/14/2024    MG 2.10 05/14/2024    ALT 12 05/14/2024    AST 18 05/14/2024    ALBUMIN 2.6 (L) 05/14/2024    BILITOT 0.6 05/14/2024    BILIDIR <0.1 02/10/2006       ASSESSMENT/PLAN:     Sedation Plan: Moderate  Patient will undergo Liver Biopsy.    Adriel Trujillo MD

## 2024-05-24 LAB — PSYCHE PATHOLOGY RESULT: NORMAL

## 2024-05-29 ENCOUNTER — PATIENT MESSAGE (OUTPATIENT)
Dept: GASTROENTEROLOGY | Facility: HOSPITAL | Age: 54
End: 2024-05-29
Payer: MEDICARE

## 2024-06-04 DIAGNOSIS — C22.0 LIVER CARCINOMA: Primary | ICD-10-CM

## 2024-06-05 ENCOUNTER — ANESTHESIA EVENT (OUTPATIENT)
Dept: ENDOSCOPY | Facility: HOSPITAL | Age: 54
End: 2024-06-05
Payer: MEDICARE

## 2024-06-05 NOTE — ANESTHESIA PREPROCEDURE EVALUATION
"                                                                                                             06/05/2024  Shira Nair is a 54 y.o., female.  Pre-operative evaluation for Procedure(s) (LRB):  DOUBLE (N/A)  COLON (N/A)    Shira Nair is a 54 y.o. female     Patient Active Problem List   Diagnosis    Metastasis to liver       Review of patient's allergies indicates:   Allergen Reactions    Ketorolac Hives    Tramadol Hives    Vancomycin analogues Hives       No current facility-administered medications on file prior to encounter.     Current Outpatient Medications on File Prior to Encounter   Medication Sig Dispense Refill    traZODone (DESYREL) 100 MG tablet Take 2 tablets by mouth every evening.         Past Surgical History:   Procedure Laterality Date    BRAIN SURGERY      CHOLECYSTECTOMY      HYSTERECTOMY       CBC: No results for input(s): "WBC", "RBC", "HGB", "HCT", "PLT", "MCV", "MCH", "MCHC" in the last 72 hours.    CMP: No results for input(s): "NA", "K", "CL", "CO2", "BUN", "CREATININE", "GLU", "MG", "PHOS", "CALCIUM", "ALBUMIN", "PROT", "ALKPHOS", "ALT", "AST", "BILITOT" in the last 72 hours.    INR  No results for input(s): "PT", "INR", "PROTIME", "APTT" in the last 72 hours.    Diagnostic Studies:  CT Chest 5/14/2024: Small indeterminate pulmonary nodules. Mild coronary artery calcifications.  No pericardial effusion No pleural effusion.  Linear opacities towards the lung bases most likely atelectasis or scarring.   CXR :    EKG :    2D Echo :  No results found for this or any previous visit.    Pre-op Assessment    I have reviewed the Patient Summary Reports.     I have reviewed the Nursing Notes. I have reviewed the NPO Status.   I have reviewed the Medications.     Review of Systems  Anesthesia Hx:  No problems with previous Anesthesia   History of prior surgery of interest to airway management or planning:  Previous anesthesia: General Hysterectomy:; GB:; 1983 Craniotomy: " with general anesthesia.       Airway issues documented on chart review include GETA     Denies Family Hx of Anesthesia complications.    Denies Personal Hx of Anesthesia complications.                    Social:  No Alcohol Use, Non-Smoker DENIES.      Hematology/Oncology:  Hematology Normal                       --  Cancer in past history:              surgery   Oncology Comments: Cervical s/p hysterectpmy. No other Tx.     EENT/Dental:  EENT/Dental Normal           Cardiovascular:  Cardiovascular Normal                                            Pulmonary:  Pulmonary Normal                       Renal/:   renal calculi               Hepatic/GI:      Liver Disease,  Liver Mets:  primary unknown.       Liver Disease        Musculoskeletal:  Musculoskeletal Normal                OB/GYN/PEDS:  Hysterectomy for Cerv CA.           Neurological:  Denies TIA.  Denies CVA.    Denies Seizures.    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 Mild Cerebral atrophy.                            Endocrine:  Endocrine Normal    S/p Thyroidectomy.        Dermatological:  Skin Normal    Psych:   anxiety depression No meds.               Physical Exam  General: Cooperative, Alert and Oriented    Airway:  Mallampati: I / I  Mouth Opening: Normal  TM Distance: Normal  Tongue: Normal  Neck ROM: Normal ROM    Dental:  Dentures  Px denies any loose teeth. Dark bases remaining bottom teeth.  Chest/Lungs:  Normal Respiratory Rate    Heart:  Rate: Normal    Abdomen:  Normal, Soft        Anesthesia Plan  Type of Anesthesia, risks & benefits discussed:    Anesthesia Type: Gen Natural Airway  Intra-op Monitoring Plan: Standard ASA Monitors  Induction:  IV  Informed Consent: Informed consent signed with the Patient and all parties understand the risks and agree with anesthesia plan.  All questions answered.   ASA Score: 3  Day of Surgery Review of History & Physical: H&P Update referred to  the surgeon/provider.I have interviewed and examined the patient. I have reviewed the patient's H&P dated:   Anesthesia Plan Notes: TIVA with mask/NC, GA back-up.     Ready For Surgery From Anesthesia Perspective.     .

## 2024-06-06 ENCOUNTER — HOSPITAL ENCOUNTER (OUTPATIENT)
Facility: HOSPITAL | Age: 54
Discharge: HOME | End: 2024-06-06
Attending: INTERNAL MEDICINE | Admitting: INTERNAL MEDICINE
Payer: MEDICARE

## 2024-06-06 ENCOUNTER — ANESTHESIA (OUTPATIENT)
Dept: ENDOSCOPY | Facility: HOSPITAL | Age: 54
End: 2024-06-06
Payer: MEDICARE

## 2024-06-06 DIAGNOSIS — Z12.11 SCREEN FOR COLON CANCER: ICD-10-CM

## 2024-06-06 DIAGNOSIS — R11.2 NAUSEA AND VOMITING, UNSPECIFIED VOMITING TYPE: ICD-10-CM

## 2024-06-06 PROCEDURE — 25000003 PHARM REV CODE 250

## 2024-06-06 PROCEDURE — 63600175 PHARM REV CODE 636 W HCPCS

## 2024-06-06 PROCEDURE — 27201423 OPTIME MED/SURG SUP & DEVICES STERILE SUPPLY: Performed by: INTERNAL MEDICINE

## 2024-06-06 PROCEDURE — 43239 EGD BIOPSY SINGLE/MULTIPLE: CPT | Performed by: INTERNAL MEDICINE

## 2024-06-06 PROCEDURE — 88305 TISSUE EXAM BY PATHOLOGIST: CPT | Performed by: INTERNAL MEDICINE

## 2024-06-06 PROCEDURE — D9220A PRA ANESTHESIA: Mod: CRNA,,,

## 2024-06-06 PROCEDURE — 88312 SPECIAL STAINS GROUP 1: CPT

## 2024-06-06 PROCEDURE — 37000008 HC ANESTHESIA 1ST 15 MINUTES: Performed by: INTERNAL MEDICINE

## 2024-06-06 PROCEDURE — 45380 COLONOSCOPY AND BIOPSY: CPT | Mod: 74 | Performed by: INTERNAL MEDICINE

## 2024-06-06 PROCEDURE — D9220A PRA ANESTHESIA: Mod: ANES,,, | Performed by: ANESTHESIOLOGY

## 2024-06-06 PROCEDURE — 88313 SPECIAL STAINS GROUP 2: CPT

## 2024-06-06 PROCEDURE — 37000009 HC ANESTHESIA EA ADD 15 MINS: Performed by: INTERNAL MEDICINE

## 2024-06-06 PROCEDURE — 45381 COLONOSCOPY SUBMUCOUS NJX: CPT | Mod: 74 | Performed by: INTERNAL MEDICINE

## 2024-06-06 RX ORDER — MIDAZOLAM HYDROCHLORIDE 2 MG/2ML
INJECTION, SOLUTION INTRAMUSCULAR; INTRAVENOUS
Status: COMPLETED
Start: 2024-06-06 | End: 2024-06-06

## 2024-06-06 RX ORDER — PROPOFOL 10 MG/ML
VIAL (ML) INTRAVENOUS
Status: COMPLETED
Start: 2024-06-06 | End: 2024-06-06

## 2024-06-06 RX ORDER — SODIUM CHLORIDE, SODIUM GLUCONATE, SODIUM ACETATE, POTASSIUM CHLORIDE AND MAGNESIUM CHLORIDE 30; 37; 368; 526; 502 MG/100ML; MG/100ML; MG/100ML; MG/100ML; MG/100ML
INJECTION, SOLUTION INTRAVENOUS CONTINUOUS
Status: DISCONTINUED | OUTPATIENT
Start: 2024-06-06 | End: 2024-06-06 | Stop reason: HOSPADM

## 2024-06-06 RX ORDER — LIDOCAINE HYDROCHLORIDE 10 MG/ML
INJECTION, SOLUTION EPIDURAL; INFILTRATION; INTRACAUDAL; PERINEURAL
Status: DISCONTINUED
Start: 2024-06-06 | End: 2024-06-06 | Stop reason: HOSPADM

## 2024-06-06 RX ORDER — PROPOFOL 10 MG/ML
VIAL (ML) INTRAVENOUS
Status: DISCONTINUED | OUTPATIENT
Start: 2024-06-06 | End: 2024-06-06

## 2024-06-06 RX ORDER — SODIUM CHLORIDE, SODIUM GLUCONATE, SODIUM ACETATE, POTASSIUM CHLORIDE AND MAGNESIUM CHLORIDE 30; 37; 368; 526; 502 MG/100ML; MG/100ML; MG/100ML; MG/100ML; MG/100ML
INJECTION, SOLUTION INTRAVENOUS CONTINUOUS
Status: CANCELLED | OUTPATIENT
Start: 2024-06-06 | End: 2024-07-06

## 2024-06-06 RX ORDER — LIDOCAINE HYDROCHLORIDE 20 MG/ML
INJECTION, SOLUTION EPIDURAL; INFILTRATION; INTRACAUDAL; PERINEURAL
Status: DISCONTINUED | OUTPATIENT
Start: 2024-06-06 | End: 2024-06-06

## 2024-06-06 RX ORDER — ONDANSETRON HYDROCHLORIDE 2 MG/ML
4 INJECTION, SOLUTION INTRAVENOUS ONCE AS NEEDED
Status: CANCELLED | OUTPATIENT
Start: 2024-06-06 | End: 2035-11-02

## 2024-06-06 RX ORDER — MIDAZOLAM HYDROCHLORIDE 2 MG/2ML
1 INJECTION, SOLUTION INTRAMUSCULAR; INTRAVENOUS
Status: DISCONTINUED | OUTPATIENT
Start: 2024-06-06 | End: 2024-06-06 | Stop reason: HOSPADM

## 2024-06-06 RX ADMIN — MIDAZOLAM HYDROCHLORIDE 1 MG: 1 INJECTION, SOLUTION INTRAMUSCULAR; INTRAVENOUS at 09:06

## 2024-06-06 RX ADMIN — MIDAZOLAM HYDROCHLORIDE 1 MG: 2 INJECTION, SOLUTION INTRAMUSCULAR; INTRAVENOUS at 09:06

## 2024-06-06 RX ADMIN — PROPOFOL 30 MG: 10 INJECTION, EMULSION INTRAVENOUS at 10:06

## 2024-06-06 RX ADMIN — PROPOFOL 40 MG: 10 INJECTION, EMULSION INTRAVENOUS at 10:06

## 2024-06-06 RX ADMIN — LIDOCAINE HYDROCHLORIDE 40 MG: 20 INJECTION, SOLUTION INTRAVENOUS at 10:06

## 2024-06-06 RX ADMIN — PROPOFOL 20 MG: 10 INJECTION, EMULSION INTRAVENOUS at 10:06

## 2024-06-06 RX ADMIN — SODIUM CHLORIDE, SODIUM GLUCONATE, SODIUM ACETATE, POTASSIUM CHLORIDE AND MAGNESIUM CHLORIDE: 526; 502; 368; 37; 30 INJECTION, SOLUTION INTRAVENOUS at 10:06

## 2024-06-06 RX ADMIN — PROPOFOL 10 MG: 10 INJECTION, EMULSION INTRAVENOUS at 10:06

## 2024-06-06 NOTE — ANESTHESIA POSTPROCEDURE EVALUATION
Anesthesia Post Evaluation    Patient: Shira Nair    Procedure(s) Performed: Procedure(s) (LRB):  DOUBLE (N/A)  COLON (N/A)  EGD, WITH CLOSED BIOPSY  COLONOSCOPY, WITH 1 OR MORE BIOPSIES  COLONOSCOPY, WITH DIRECTED SUBMUCOSAL INJECTION    Final Anesthesia Type: general (_TIVA Olga Lidia AW)      Patient location during evaluation: GI PACU  Patient participation: Yes- Able to Participate  Level of consciousness: awake and alert, awake and oriented  Post-procedure vital signs: reviewed and stable  Pain management: adequate  Airway patency: patent    PONV status at discharge: No PONV  Anesthetic complications: no      Cardiovascular status: blood pressure returned to baseline, hemodynamically stable and stable  Respiratory status: unassisted, spontaneous ventilation and room air  Hydration status: euvolemic  Follow-up not needed.              Vitals Value Taken Time   /93 06/06/24 1132   Temp 36.1 °C (97 °F) 06/06/24 1107   Pulse 98 06/06/24 1132   Resp 18 06/06/24 1132   SpO2 99 % 06/06/24 1132         No case tracking events are documented in the log.      Pain/Wallace Score: Wallace Score: 10 (6/6/2024 11:32 AM)

## 2024-06-06 NOTE — TRANSFER OF CARE
"Anesthesia Transfer of Care Note    Patient: Shira Nair    Procedure(s) Performed: Procedure(s) (LRB):  DOUBLE (N/A)  COLON (N/A)  EGD, WITH CLOSED BIOPSY  COLONOSCOPY, WITH 1 OR MORE BIOPSIES  COLONOSCOPY, WITH DIRECTED SUBMUCOSAL INJECTION    Patient location: GI    Anesthesia Type: MAC    Transport from OR: Transported from OR on room air with adequate spontaneous ventilation    Post pain: adequate analgesia    Post assessment: no apparent anesthetic complications    Post vital signs: stable    Level of consciousness: awake    Nausea/Vomiting: no nausea/vomiting    Complications: none    Transfer of care protocol was followed      Last vitals: Visit Vitals  BP (!) 141/93   Pulse 95   Temp 36.2 °C (97.2 °F)   Resp (!) 22   Ht 5' 10" (1.778 m)   Wt 70.3 kg (155 lb)   SpO2 95%   BMI 22.24 kg/m²     "

## 2024-06-06 NOTE — PROVATION PATIENT INSTRUCTIONS
Discharge Summary/Instructions after an Endoscopic Procedure  Patient Name: Shira Nair  Patient MRN: [id number]  Patient YOB: 1970 Thursday, June 6, 2024  Kevin Causey MD  Dear patient,  As a result of recent federal legislation (The Federal Cures Act), you may   receive lab or pathology results from your procedure in your [facility]   account before your physician is able to contact you. Your physician or   their representative will relay the results to you with their   recommendations at their soonest availability.  Thank you,  RESTRICTIONS:  During your procedure today, you received medications for sedation.  These   medications may affect your judgment, balance and coordination.  Therefore,   for 24 hours, you have the following restrictions:   - DO NOT drive a car, operate machinery, make legal/financial decisions,   sign important papers or drink alcohol.    ACTIVITY:  Today: no heavy lifting, straining or running due to procedural   sedation/anesthesia.  The following day: return to full activity including work.  DIET:  Eat and drink normally unless instructed otherwise.     TREATMENT FOR COMMON SIDE EFFECTS:  - Mild abdominal pain, nausea, belching, bloating or excessive gas:  rest,   eat lightly and use a heating pad.  - Sore Throat: treat with throat lozenges and/or gargle with warm salt   water.  - Because air was used during the procedure, expelling large amounts of air   from your rectum or belching is normal.  - If a bowel prep was taken, you may not have a bowel movement for 1-3 days.    This is normal.  SYMPTOMS TO WATCH FOR AND REPORT TO YOUR PHYSICIAN:  1. Abdominal pain or bloating, other than gas cramps.  2. Chest pain.  3. Back pain.  4. Signs of infection such as: chills or fever occurring within 24 hours   after the procedure.  5. Rectal bleeding, which would show as bright red, maroon, or black stools.   (A tablespoon of blood from the rectum is not serious, especially  if   hemorrhoids are present.)  6. Vomiting.  7. Weakness or dizziness.  GO DIRECTLY TO THE NEAREST EMERGENCY ROOM IF YOU HAVE ANY OF THE FOLLOWING:      Difficulty breathing              Chills and/or fever over 101 F   Persistent vomiting and/or vomiting blood   Severe abdominal pain   Severe chest pain   Black, tarry stools   Bleeding- more than one tablespoon   Any other symptom or condition that you feel may need urgent attention  Your doctor recommends these additional instructions:  If any biopsies were taken, your doctors clinic will contact you in 1 to 2   weeks with any results.  - Discharge patient to home.   - Resume previous diet.   - Continue present medications.   - Await pathology results.  For questions, problems or results please call your physician - Kevin Causey MD at Work:  [phone].  [facility] EMERGENCY ROOM PHONE NUMBER: [phone]  IF A COMPLICATION OR EMERGENCY SITUATION ARISES AND YOU ARE UNABLE TO REACH   YOUR PHYSICIAN - GO DIRECTLY TO THE EMERGENCY ROOM.  MD Kevin Barger MD  6/6/2024 11:15:03 AM  This report has been verified and signed electronically.  Dear patient,  As a result of recent federal legislation (The Federal Cures Act), you may   receive lab or pathology results from your procedure in your [facility]   account before your physician is able to contact you. Your physician or   their representative will relay the results to you with their   recommendations at their soonest availability.  Thank you,  PROVATION

## 2024-06-06 NOTE — PROVATION PATIENT INSTRUCTIONS
Discharge Summary/Instructions after an Endoscopic Procedure  Patient Name: Shira Nair  Patient MRN: [id number]  Patient YOB: 1970 Thursday, June 6, 2024  Kevin Causey MD  Dear patient,  As a result of recent federal legislation (The Federal Cures Act), you may   receive lab or pathology results from your procedure in your [facility]   account before your physician is able to contact you. Your physician or   their representative will relay the results to you with their   recommendations at their soonest availability.  Thank you,  RESTRICTIONS:  During your procedure today, you received medications for sedation.  These   medications may affect your judgment, balance and coordination.  Therefore,   for 24 hours, you have the following restrictions:   - DO NOT drive a car, operate machinery, make legal/financial decisions,   sign important papers or drink alcohol.    ACTIVITY:  Today: no heavy lifting, straining or running due to procedural   sedation/anesthesia.  The following day: return to full activity including work.  DIET:  Eat and drink normally unless instructed otherwise.     TREATMENT FOR COMMON SIDE EFFECTS:  - Mild abdominal pain, nausea, belching, bloating or excessive gas:  rest,   eat lightly and use a heating pad.  - Sore Throat: treat with throat lozenges and/or gargle with warm salt   water.  - Because air was used during the procedure, expelling large amounts of air   from your rectum or belching is normal.  - If a bowel prep was taken, you may not have a bowel movement for 1-3 days.    This is normal.  SYMPTOMS TO WATCH FOR AND REPORT TO YOUR PHYSICIAN:  1. Abdominal pain or bloating, other than gas cramps.  2. Chest pain.  3. Back pain.  4. Signs of infection such as: chills or fever occurring within 24 hours   after the procedure.  5. Rectal bleeding, which would show as bright red, maroon, or black stools.   (A tablespoon of blood from the rectum is not serious, especially  if   hemorrhoids are present.)  6. Vomiting.  7. Weakness or dizziness.  GO DIRECTLY TO THE NEAREST EMERGENCY ROOM IF YOU HAVE ANY OF THE FOLLOWING:      Difficulty breathing              Chills and/or fever over 101 F   Persistent vomiting and/or vomiting blood   Severe abdominal pain   Severe chest pain   Black, tarry stools   Bleeding- more than one tablespoon   Any other symptom or condition that you feel may need urgent attention  Your doctor recommends these additional instructions:  If any biopsies were taken, your doctors clinic will contact you in 1 to 2   weeks with any results.  - dc to home  - Resume previous diet.   - Continue present medications.   - Await pathology results.   - Refer to oncology and surgeon.  - clip in place if needed to id per ct a/p or pet scan  For questions, problems or results please call your physician - Kevin Causey MD at Work:  [phone].  [facility], EMERGENCY ROOM PHONE NUMBER: [phone]  IF A COMPLICATION OR EMERGENCY SITUATION ARISES AND YOU ARE UNABLE TO REACH   YOUR PHYSICIAN - GO DIRECTLY TO THE EMERGENCY ROOM.  MD Kevin Barger MD  6/6/2024 11:12:11 AM  This report has been verified and signed electronically.  Dear patient,  As a result of recent federal legislation (The Federal Cures Act), you may   receive lab or pathology results from your procedure in your [facility]   account before your physician is able to contact you. Your physician or   their representative will relay the results to you with their   recommendations at their soonest availability.  Thank you,  PROVATION

## 2024-06-06 NOTE — H&P
History and Physical           HPI:     Patient is a 54 y.o. female presents for EGD and colonoscopy to further evaluate primary source of malignancy.  Recently diagnosed with metastatic disease to liver and lung    PCP:  Amado Baig III, MD    Review of patient's allergies indicates:   Allergen Reactions    Ketorolac Hives    Tramadol Hives    Vancomycin analogues Hives        Past Medical History:  Past Medical History:   Diagnosis Date    Brain tumor     Cervical ca     Depression     Opioid abuse       Past Surgical History:  Past Surgical History:   Procedure Laterality Date    BRAIN SURGERY      CHOLECYSTECTOMY      HYSTERECTOMY        Family History:  No family history on file.  Social History:  Social History     Tobacco Use    Smoking status: Former     Types: Cigarettes    Smokeless tobacco: Never   Substance Use Topics    Alcohol use: Never         Review of Systems:     Review of Systems    Objective:     VITAL SIGNS: 24 HR MIN & MAX LAST    Temp  Min: 97.2 °F (36.2 °C)  Max: 97.2 °F (36.2 °C)  97.2 °F (36.2 °C)        BP  Min: 141/93  Max: 141/93  (!) 141/93     Pulse  Min: 95  Max: 95  95     Resp  Min: 22  Max: 22  (!) 22    SpO2  Min: 95 %  Max: 95 %  95 %      Physical Exam      No results found for this or any previous visit (from the past 48 hour(s)).    CT Biopsy Liver (xpd)    Result Date: 5/23/2024  EXAMINATION: CT BIOPSY LIVER (XPD) CLINICAL HISTORY: ATTENDING: Adriel Trujillo ANESTHESIA: Lidocaine was utilized for local anesthesia at patient's request.  IV was unable to be started. TECHNIQUE: CT guided Biopsy of a right liver lesion. Automatic exposure control was utilized to reduce patient radiation dosage. DLP = 422 After the risks, benefits and alternatives were discussed, consent was obtained. A time out was performed to verify the patient's identity and procedure. The right liver lesion was localized with CT to confirm a window for biopsy. The skin over the biopsy site was cleaned  and prepped in normal sterile fashion. Subcutaneous tissues were anesthetized with a lidocaine solution. A 5 cm 19 gauge coaxial needle was advanced to the mass. After confirming the position the stylus was removed and 2 core biopsies were obtained.  The pathologist was present and confirmed adequacy of the samples. The patient tolerated the procedure well. Estimated blood loss: < 10ml. Followup CT scan demonstrated no evidence of hematoma.     CT-guided right liver biopsy as described above. Electronically signed by: Adriel Trujillo Date:    05/23/2024 Time:    13:32    CT Head Without Contrast    Result Date: 5/14/2024  EXAMINATION: CT HEAD WITHOUT CONTRAST CLINICAL HISTORY: Metastatic disease evaluation; TECHNIQUE: Multiple axial images were obtained from the base of the brain to the vertex without contrast administration.  Sagittal and coronal reconstructions were performed. .Automatic exposure control  (AEC) is utilized to reduce patient radiation exposure. COMPARISON: 03/23/2023 FINDINGS: Some mild cerebral atrophy seen.  The patient has a ventriculoperitoneal shunt with a left posterior parietal approach and a right posterior parietal approach.  The ventricles are not significantly dilated.  There is area of encephalomalacia in the right posterior parietal region which is unchanged.  No evidence of acute hemorrhage or infarction is seen.  No obvious mass is seen.  Posterior fossa appears grossly unremarkable.  The calvarium is intact.  Paranasal sinuses appear unremarkable.     Not significantly changed since prior examination Electronically signed by: Isac Perez Date:    05/14/2024 Time:    11:50    CT Chest Without Contrast    Result Date: 5/14/2024  EXAMINATION: CT CHEST WITHOUT CONTRAST CLINICAL HISTORY: metastatic eval; TECHNIQUE: Helical acquisition through the chest performed without IV contrast. Three plane reconstructions made available for review.  mGycm. Automatic exposure control,  adjustment of mA/kV or iterative reconstruction technique was used to reduce radiation. COMPARISON: 30 October 2017 FINDINGS: There is no mediastinal, hilar or axillary lymphadenopathy by size criteria. The heart is not enlarged.  Mild coronary artery calcifications.  No pericardial effusion. There is no pleural effusion.  Linear opacities towards the lung bases most likely atelectasis or scarring.  There are few small pulmonary nodules which were not seen in 2017.  For example right middle lobe image 51 series 12 measures only 3 mm.  Left upper lobe image 49 series 12 also measures about 3 mm. Upper abdomen discussed on recent prior CT of the abdomen.  No acute osseous findings.     Small indeterminate pulmonary nodules which were not seen in 2017.  These can be followed on surveillance scans. Electronically signed by: Vincent Baeza Date:    05/14/2024 Time:    09:15    CT Abdomen Pelvis With IV Contrast NO Oral Contrast    Result Date: 5/13/2024  EXAMINATION: CT ABDOMEN PELVIS WITH IV CONTRAST CLINICAL HISTORY: Non-small cell lung cancer (NSCLC), staging; TECHNIQUE: Low dose axial images, sagittal and coronal reformations were obtained from the lung bases to the pubic symphysis following the IV administration of 90 mL of Omnipaque 300 no oral contrast was given. Automatic exposure control (AEC) was utilized for dose reduction. Dose: 403 mGycm COMPARISON: 01/03/2024 FINDINGS: There are mild atelectatic changes in the left lung base.  There are lesions throughout the liver highly suspicious for metastatic disease.  The largest measures 5.8 x 7 cm.  Spleen appears normal.  Pancreas appears normal.  The adrenals are not enlarged.  Kidneys appear normal.  Aorta shows no evidence of an aneurysm.  The appendix appears normal.  Small bowel is not dilated     Changes most consistent with metastatic disease to the liver. Electronically signed by: Javi Trujillo MD Date:    05/13/2024 Time:    13:41      [unfilled]    Assessment  /Plan:   Malignancy of unknown primary; meds-liver/lung  Personal history of cervical cancer/breast cancer    Proceed with EGD/colonoscopy  Patient Active Problem List    Diagnosis Date Noted    Metastasis to liver 05/13/2024        Thank you for allowing us to participate in this patient's care.

## 2024-06-07 VITALS
HEART RATE: 98 BPM | DIASTOLIC BLOOD PRESSURE: 93 MMHG | HEIGHT: 70 IN | RESPIRATION RATE: 18 BRPM | TEMPERATURE: 97 F | BODY MASS INDEX: 22.19 KG/M2 | WEIGHT: 155 LBS | OXYGEN SATURATION: 99 % | SYSTOLIC BLOOD PRESSURE: 153 MMHG

## 2024-06-11 LAB — PSYCHE PATHOLOGY RESULT: NORMAL

## 2024-06-12 NOTE — PROGRESS NOTES
Subjective:       Patient ID: Shira Nair is a 54 y.o. female.    Chief Complaint: New Patient    Diagnosis: Metastatic Colon Adenocarcinoma - mets to liver and lung    Current Treatment: None    Treatment History: N/A    HPI  55yo F presents in June '24 for evaluation of liver metastases. She presented to OSH in May '24 with 1 month of abdominal pain. CT A/P at that time revealed numerous hepatic lesions, largest measuring 5.8 x 7cm, concerning for metastatic disease. CT Chest revealed numerous new bilateral pulmonary nodules measuring 3-4mm in size of indeterminate etiology. IR liver biopsy was done which was completely necrotic. An egd and colonoscopy done with Dr. Jones in June '24 revealed a severely stenotic malignant lesion in the mid descending colon that was unable to be traversed. Biopsy was taken which revealed at least high grade dysplasia suspicious for adenocarcinoma. Her CEA at time of imaging/workup was 546. he has a history of cervical cancer about 15 years ago, she had a total hysterectomy for this. She has a history of a benign brain tumor that required  shunt placed in the 1980s. She also had benign thyroid tumors removed about 8 years ago.    She presents to clinic today for further oncology evaluation. She notes occasional RUQ abdominal pain and normal for her bowel movements every 3-4 days. She continues to eat well. Denies any fatigue. She lives in [place] with her family. Has a good support system.     I discussed her diagnosis, staging, and referenced NCCN guidelines when discussing her treatment plan moving forward. I explained my concern with her severe stenosis for possible bowel perforation especially with chemotherapy initiation - she is agreeable to ostomy placement as well as the need for further confirmatory tissue biopsy.       Past Medical History:   Diagnosis Date    Brain tumor     Cervical ca     Depression     Opioid abuse       Past Surgical History:    Procedure Laterality Date    BRAIN SURGERY      CHOLECYSTECTOMY      COLONOSCOPY N/A 06/06/2024    Procedure: COLON;  Surgeon: Kevin Causey MD;  Location: [facility] ENDOSCOPY;  Service: Gastroenterology;  Laterality: N/A;    COLONOSCOPY, WITH 1 OR MORE BIOPSIES  06/06/2024    Procedure: COLONOSCOPY, WITH 1 OR MORE BIOPSIES;  Surgeon: Kevin Causey MD;  Location: [facility] ENDOSCOPY;  Service: Gastroenterology;;    COLONOSCOPY, WITH DIRECTED SUBMUCOSAL INJECTION  06/06/2024    Procedure: COLONOSCOPY, WITH DIRECTED SUBMUCOSAL INJECTION;  Surgeon: Kevin Causey MD;  Location: [facility] ENDOSCOPY;  Service: Gastroenterology;;    EGD, WITH CLOSED BIOPSY  06/06/2024    Procedure: EGD, WITH CLOSED BIOPSY;  Surgeon: Kevin Causey MD;  Location: [facility] ENDOSCOPY;  Service: Gastroenterology;;    ESOPHAGOGASTRODUODENOSCOPY N/A 06/06/2024    Procedure: DOUBLE;  Surgeon: Kevin Causey MD;  Location: [facility] ENDOSCOPY;  Service: Gastroenterology;  Laterality: N/A;    HYSTERECTOMY      KNEE SURGERY      THYROID LOBECTOMY       Social History     Socioeconomic History    Marital status: Single   Tobacco Use    Smoking status: Never    Smokeless tobacco: Never   Substance and Sexual Activity    Alcohol use: Never    Drug use: Not Currently     Types: Cocaine     Comment: 5-6 YEARS QUIT ON SUBOXONE      Family History   Problem Relation Name Age of Onset    Hypertension Mother      Hypertension Father      Stroke Father      Pancreatic cancer Brother      Cancer Paternal Grandfather        Review of patient's allergies indicates:   Allergen Reactions    Ketorolac Hives    Tramadol Hives    Vancomycin analogues Hives      Review of Systems   Constitutional:  Negative for activity change, fever and unexpected weight change.   HENT:  Negative for sore throat.    Eyes:  Negative for visual disturbance.   Respiratory:  Negative for cough and shortness of breath.    Cardiovascular:   Negative for chest pain.   Gastrointestinal:  Negative for abdominal pain, diarrhea, nausea and vomiting.   Endocrine: Negative for polyuria.   Genitourinary:  Negative for dysuria and hot flashes.   Integumentary:  Negative for rash.   Neurological:  Negative for weakness and headaches.   Hematological:  Negative for adenopathy.   Psychiatric/Behavioral:  Negative for confusion.          Objective:      Vitals:    06/13/24 1326   BP: 112/76   Pulse: 85   Resp: 20   Temp: 97.9 °F (36.6 °C)       Physical Exam  Constitutional:       General: She is not in acute distress.     Appearance: Normal appearance. She is not ill-appearing.   HENT:      Head: Normocephalic and atraumatic.      Nose: Nose normal.      Mouth/Throat:      Mouth: Mucous membranes are moist.      Pharynx: Oropharynx is clear.   Eyes:      Extraocular Movements: Extraocular movements intact.      Conjunctiva/sclera: Conjunctivae normal.      Pupils: Pupils are equal, round, and reactive to light.   Cardiovascular:      Rate and Rhythm: Normal rate and regular rhythm.      Pulses: Normal pulses.      Heart sounds: Normal heart sounds. No murmur heard.  Pulmonary:      Effort: Pulmonary effort is normal. No respiratory distress.      Breath sounds: Normal breath sounds.   Abdominal:      General: There is no distension.      Palpations: Abdomen is soft.      Tenderness: There is no abdominal tenderness.   Musculoskeletal:         General: Normal range of motion.      Cervical back: Normal range of motion and neck supple.      Right lower leg: No edema.      Left lower leg: No edema.   Lymphadenopathy:      Cervical: No cervical adenopathy.   Skin:     General: Skin is warm and dry.   Neurological:      General: No focal deficit present.      Mental Status: She is alert and oriented to person, place, and time.         LABS AND IMAGING REVIEWED IN EPIC  1/3/24 CT Abd/Pelvis:  1. Postoperative changes are present compatible with a previous  cholecystectomy. An elliptical calcification is noted within the gallbladder fossa and I suspect represents chronic calcification of a postoperative hematoma.  Clinical correlation is recommended.  2. Left-sided, nonobstructing nephrolithiasis as described above.  3. Chronic changes are present as described above.  See above comments.  5/13/24 CT Abd/Pelvis:  Changes most consistent with metastatic disease to the liver.   5/14/24 CT Chest:  Small indeterminate pulmonary nodules which were not seen in 2017. These can be followed on surveillance scans.       Pathology:  6/6/24 EGD/Colonoscopy Biopsy:  1. STOMACH BIOPSY:    -MODERATE CHRONIC GASTRITIS, FOCALLY ACTIVE (SEE COMMENT).    -NO EVIDENCE OF DYSPLASIA OR MALIGNANCY.   2. COLON STRICTURE BIOPSY:    -FRAGMENTS OF COLONIC-TYPE MUCOSA WITH AT LEAST HIGH GRADE DYSPLASIA, SUSPICIOUS   FOR ADENOCARCINOMA (SEE COMMENTS).     Assessment:   Stage IV Colon Adenocarcinoma - mets to liver and suspected lung. Given severe stenosis with abdominal pain will need diversion prior to chemotherapy initiation. Will also need repeat biopsy of liver or colon to allow for adenocarcinoma confirmation and NGS testing to aid in final chemotherapy options.         Plan:       - PET scan to complete staging  - Referral to surgical oncology for tissue biopsy - liver or colon, diverting ostomy given severe stenosis on scope, and mediport placement  - Will plan for NGS testing on repeat pathology which confirms suspected adenocarcinoma  - RTC 3-4 weeks for MD visit, treatment start, labs same day - CBC, CMP, CEA    I spent a total of 60 minutes on the day of the visit.This includes face to face time and non-face to face time preparing to see the patient (eg, review of tests), obtaining and/or reviewing separately obtained history, documenting clinical information in the electronic or other health record, independently interpreting results and communicating results to the  patient/family/caregiver, or care coordinator.        Elizabeth Kennedy LeJeune, MD  Hematology/Oncology   [facility]          Professional Services:  IViviane LPN, acted solely as a scribe for and in the presence of Dr. Elizabeth Lejeune, who performed these services.

## 2024-06-13 ENCOUNTER — OFFICE VISIT (OUTPATIENT)
Dept: HEMATOLOGY/ONCOLOGY | Facility: CLINIC | Age: 54
End: 2024-06-13
Payer: MEDICARE

## 2024-06-13 VITALS
DIASTOLIC BLOOD PRESSURE: 76 MMHG | OXYGEN SATURATION: 98 % | HEART RATE: 85 BPM | WEIGHT: 158.69 LBS | BODY MASS INDEX: 22.72 KG/M2 | SYSTOLIC BLOOD PRESSURE: 112 MMHG | HEIGHT: 70 IN | TEMPERATURE: 98 F | RESPIRATION RATE: 20 BRPM

## 2024-06-13 DIAGNOSIS — C22.0 LIVER CARCINOMA: Primary | ICD-10-CM

## 2024-06-13 DIAGNOSIS — C22.0 LIVER CARCINOMA: ICD-10-CM

## 2024-06-13 DIAGNOSIS — C78.7 METASTATIC COLON CANCER TO LIVER: ICD-10-CM

## 2024-06-13 DIAGNOSIS — C18.9 COLON ADENOCARCINOMA: Primary | ICD-10-CM

## 2024-06-13 DIAGNOSIS — C78.7 METASTATIC COLON CANCER TO LIVER: Primary | ICD-10-CM

## 2024-06-13 DIAGNOSIS — C18.9 METASTATIC COLON CANCER TO LIVER: ICD-10-CM

## 2024-06-13 DIAGNOSIS — C18.9 METASTATIC COLON CANCER TO LIVER: Primary | ICD-10-CM

## 2024-06-13 DIAGNOSIS — Z45.2 ADMISSION FOR FITTING OF PORT-A-CATH: ICD-10-CM

## 2024-06-13 DIAGNOSIS — C78.7 METASTASIS TO LIVER: ICD-10-CM

## 2024-06-13 PROCEDURE — 3008F BODY MASS INDEX DOCD: CPT | Mod: CPTII,S$GLB,, | Performed by: STUDENT IN AN ORGANIZED HEALTH CARE EDUCATION/TRAINING PROGRAM

## 2024-06-13 PROCEDURE — 1159F MED LIST DOCD IN RCRD: CPT | Mod: CPTII,S$GLB,, | Performed by: STUDENT IN AN ORGANIZED HEALTH CARE EDUCATION/TRAINING PROGRAM

## 2024-06-13 PROCEDURE — 99205 OFFICE O/P NEW HI 60 MIN: CPT | Mod: S$GLB,,, | Performed by: STUDENT IN AN ORGANIZED HEALTH CARE EDUCATION/TRAINING PROGRAM

## 2024-06-13 PROCEDURE — 99999 PR PBB SHADOW E&M-EST. PATIENT-LVL IV: CPT | Mod: PBBFAC,,, | Performed by: STUDENT IN AN ORGANIZED HEALTH CARE EDUCATION/TRAINING PROGRAM

## 2024-06-13 PROCEDURE — 1160F RVW MEDS BY RX/DR IN RCRD: CPT | Mod: CPTII,S$GLB,, | Performed by: STUDENT IN AN ORGANIZED HEALTH CARE EDUCATION/TRAINING PROGRAM

## 2024-06-13 PROCEDURE — 3074F SYST BP LT 130 MM HG: CPT | Mod: CPTII,S$GLB,, | Performed by: STUDENT IN AN ORGANIZED HEALTH CARE EDUCATION/TRAINING PROGRAM

## 2024-06-13 PROCEDURE — 3078F DIAST BP <80 MM HG: CPT | Mod: CPTII,S$GLB,, | Performed by: STUDENT IN AN ORGANIZED HEALTH CARE EDUCATION/TRAINING PROGRAM

## 2024-06-13 RX ORDER — PROCHLORPERAZINE MALEATE 10 MG
10 TABLET ORAL EVERY 6 HOURS PRN
Qty: 30 TABLET | Refills: 1 | Status: SHIPPED | OUTPATIENT
Start: 2024-06-13 | End: 2025-06-13

## 2024-06-13 RX ORDER — OLANZAPINE 5 MG/1
5 TABLET ORAL NIGHTLY
Qty: 10 TABLET | Refills: 2 | Status: SHIPPED | OUTPATIENT
Start: 2024-06-13 | End: 2024-07-13

## 2024-06-13 RX ORDER — ONDANSETRON HYDROCHLORIDE 8 MG/1
8 TABLET, FILM COATED ORAL EVERY 8 HOURS PRN
Qty: 30 TABLET | Refills: 2 | Status: SHIPPED | OUTPATIENT
Start: 2024-06-13 | End: 2025-06-13

## 2024-06-13 RX ORDER — LOPERAMIDE HYDROCHLORIDE 2 MG/1
2 CAPSULE ORAL 4 TIMES DAILY PRN
Qty: 20 CAPSULE | Refills: 2 | Status: SHIPPED | OUTPATIENT
Start: 2024-06-13

## 2024-06-17 ENCOUNTER — CLINICAL SUPPORT (OUTPATIENT)
Dept: HEMATOLOGY/ONCOLOGY | Facility: CLINIC | Age: 54
End: 2024-06-17
Payer: MEDICARE

## 2024-06-17 ENCOUNTER — OFFICE VISIT (OUTPATIENT)
Dept: HEMATOLOGY/ONCOLOGY | Facility: CLINIC | Age: 54
End: 2024-06-17
Payer: MEDICARE

## 2024-06-17 VITALS
HEART RATE: 80 BPM | SYSTOLIC BLOOD PRESSURE: 109 MMHG | DIASTOLIC BLOOD PRESSURE: 74 MMHG | WEIGHT: 160 LBS | BODY MASS INDEX: 22.9 KG/M2 | TEMPERATURE: 99 F | HEIGHT: 70 IN | OXYGEN SATURATION: 97 %

## 2024-06-17 DIAGNOSIS — C18.9 METASTATIC COLON CANCER TO LIVER: Primary | ICD-10-CM

## 2024-06-17 DIAGNOSIS — C78.7 METASTATIC COLON CANCER TO LIVER: Primary | ICD-10-CM

## 2024-06-17 PROCEDURE — 3074F SYST BP LT 130 MM HG: CPT | Mod: CPTII,S$GLB,,

## 2024-06-17 PROCEDURE — 99215 OFFICE O/P EST HI 40 MIN: CPT | Mod: S$GLB,,,

## 2024-06-17 PROCEDURE — 99999 PR PBB SHADOW E&M-EST. PATIENT-LVL I: CPT | Mod: PBBFAC,,,

## 2024-06-17 PROCEDURE — 99999 PR PBB SHADOW E&M-EST. PATIENT-LVL III: CPT | Mod: PBBFAC,,,

## 2024-06-17 PROCEDURE — 3078F DIAST BP <80 MM HG: CPT | Mod: CPTII,S$GLB,,

## 2024-06-17 PROCEDURE — 3008F BODY MASS INDEX DOCD: CPT | Mod: CPTII,S$GLB,,

## 2024-06-17 PROCEDURE — 1159F MED LIST DOCD IN RCRD: CPT | Mod: CPTII,S$GLB,,

## 2024-06-17 NOTE — PROGRESS NOTES
THERAPY EDUCATION: FOLFOX  Subjective:      Patient ID: Shira Nair is a 54 y.o. female.    Chief Complaint:Therapy Education     Diagnosis: Metastatic Colon Adenocarcinoma - mets to liver and lung    Current Treatment: FOLFOX  to start on 7/15/24    Treatment History: N/A    HPI  55yo F presents in June '24 for evaluation of liver metastases. She presented to OSH in May '24 with 1 month of abdominal pain. CT A/P at that time revealed numerous hepatic lesions, largest measuring 5.8 x 7cm, concerning for metastatic disease. CT Chest revealed numerous new bilateral pulmonary nodules measuring 3-4mm in size of indeterminate etiology. IR liver biopsy was done which was completely necrotic. An egd and colonoscopy done with Dr. Jones in June '24 revealed a severely stenotic malignant lesion in the mid descending colon that was unable to be traversed. Biopsy was taken which revealed at least high grade dysplasia suspicious for adenocarcinoma. Her CEA at time of imaging/workup was 546. he has a history of cervical cancer about 15 years ago, she had a total hysterectomy for this. She has a history of a benign brain tumor that required  shunt placed in the 1980s. She also had benign thyroid tumors removed about 8 years ago.    She presents to clinic today for further oncology evaluation. She notes occasional RUQ abdominal pain and normal for her bowel movements every 3-4 days. She continues to eat well. Denies any fatigue. She lives in [place] with her family. Has a good support system.     I discussed her diagnosis, staging, and referenced NCCN guidelines when discussing her treatment plan moving forward. I explained my concern with her severe stenosis for possible bowel perforation especially with chemotherapy initiation - she is agreeable to ostomy placement as well as the need for further confirmatory tissue biopsy.     Interval History     6/17/24: Ms. Nair presents to the clinic accompanied by her  sister for therapy education. Patient reports no major complaints at today's visit. Denies fever, chills, SOB, N/V/D, constipation, recent infection, chest pain or unexplained bleeding or bruising.        Past Medical History:   Diagnosis Date    Brain tumor     Cervical ca     Depression     Opioid abuse       Past Surgical History:   Procedure Laterality Date    BRAIN SURGERY      CHOLECYSTECTOMY      COLONOSCOPY N/A 06/06/2024    Procedure: COLON;  Surgeon: Kevin Causey MD;  Location: [facility] ENDOSCOPY;  Service: Gastroenterology;  Laterality: N/A;    COLONOSCOPY, WITH 1 OR MORE BIOPSIES  06/06/2024    Procedure: COLONOSCOPY, WITH 1 OR MORE BIOPSIES;  Surgeon: Kevin Causey MD;  Location: [facility] ENDOSCOPY;  Service: Gastroenterology;;    COLONOSCOPY, WITH DIRECTED SUBMUCOSAL INJECTION  06/06/2024    Procedure: COLONOSCOPY, WITH DIRECTED SUBMUCOSAL INJECTION;  Surgeon: Kevin Causey MD;  Location: [facility] ENDOSCOPY;  Service: Gastroenterology;;    EGD, WITH CLOSED BIOPSY  06/06/2024    Procedure: EGD, WITH CLOSED BIOPSY;  Surgeon: Kevin Causey MD;  Location: [facility] ENDOSCOPY;  Service: Gastroenterology;;    ESOPHAGOGASTRODUODENOSCOPY N/A 06/06/2024    Procedure: DOUBLE;  Surgeon: Kevin Causey MD;  Location: [facility] ENDOSCOPY;  Service: Gastroenterology;  Laterality: N/A;    HYSTERECTOMY      KNEE SURGERY      THYROID LOBECTOMY       Social History     Socioeconomic History    Marital status: Single   Tobacco Use    Smoking status: Never    Smokeless tobacco: Never   Substance and Sexual Activity    Alcohol use: Never    Drug use: Not Currently     Types: Cocaine     Comment: 5-6 YEARS QUIT ON SUBOXONE      Family History   Problem Relation Name Age of Onset    Hypertension Mother      Hypertension Father      Stroke Father      Pancreatic cancer Brother      Cancer Paternal Grandfather        Review of patient's allergies indicates:   Allergen  Reactions    Ketorolac Hives    Tramadol Hives    Vancomycin analogues Hives      Review of Systems   Constitutional:  Negative for activity change, fever and unexpected weight change.   HENT:  Negative for sore throat.    Eyes:  Negative for visual disturbance.   Respiratory:  Negative for cough and shortness of breath.    Cardiovascular:  Negative for chest pain.   Gastrointestinal:  Negative for abdominal pain, diarrhea, nausea and vomiting.   Endocrine: Negative for polyuria.   Genitourinary:  Negative for dysuria and hot flashes.   Integumentary:  Negative for rash.   Neurological:  Negative for weakness and headaches.   Hematological:  Negative for adenopathy.   Psychiatric/Behavioral:  Negative for confusion.        Objective:     Vitals:    06/17/24 0925   BP: 109/74   Pulse: 80   Temp: 98.5 °F (36.9 °C)       LABS AND IMAGING REVIEWED IN EPIC  1/3/24 CT Abd/Pelvis:  1. Postoperative changes are present compatible with a previous cholecystectomy. An elliptical calcification is noted within the gallbladder fossa and I suspect represents chronic calcification of a postoperative hematoma.  Clinical correlation is recommended.  2. Left-sided, nonobstructing nephrolithiasis as described above.  3. Chronic changes are present as described above.  See above comments.  5/13/24 CT Abd/Pelvis:  Changes most consistent with metastatic disease to the liver.   5/14/24 CT Chest:  Small indeterminate pulmonary nodules which were not seen in 2017. These can be followed on surveillance scans.       Pathology:  6/6/24 EGD/Colonoscopy Biopsy:  1. STOMACH BIOPSY:    -MODERATE CHRONIC GASTRITIS, FOCALLY ACTIVE (SEE COMMENT).    -NO EVIDENCE OF DYSPLASIA OR MALIGNANCY.   2. COLON STRICTURE BIOPSY:    -FRAGMENTS OF COLONIC-TYPE MUCOSA WITH AT LEAST HIGH GRADE DYSPLASIA, SUSPICIOUS   FOR ADENOCARCINOMA (SEE COMMENTS).     Assessment:   Stage IV Colon Adenocarcinoma - mets to liver and suspected lung.   Plan:   -Therapy education  completed on 6/17/24.  -PET scan scheduled for 6/27/24.  -Referral to surgical oncology for tissue biopsy with Dr. Rubio Cast on 7/10/24  -Plans to start FOLFOX tentatively on 7/15/24. Made patient aware that she will receive premedications given 30 minutes prior to each treatment to help prevent nausea. Patient understood that she will be going home with a 5FU pump for 2 days and will need to return to the clinic to have pump disconnected. Patient aware the importance of monitoring the pump 3 to 4 times a day to check for leaks or kinks. Patient also aware of the importance of avoiding cold drinks and cold food on the day of and 2 days after treatment with Oxaliplatin.     -Antiemetics regimen schedule made and given with calendar for guidance     Olanzapine- Take 1 tablet by mouth on Days 1-3 of each chemo cycle   -Mediport placement awaiting to be scheduled.   -Zofran PRN prescribed for at home with instructions to be taken by mouth every 8 hours as needed for nausea. If not working, Compazine prescribed as 2nd choice that can be taken every 6 hours as needed.   -OTC Imodium AD recommended for diarrhea (4-6 BMs a day). Take 2 tablets after the first loose bowel movement, and 1 tablet after each loose bowel movement after the first dose has been taken. No more than 4 tablets should be taken in any 24-hour period. If not working, Lomotil can be prescribed as 2nd choice.    -Mouth sore prevention with 1-quart warm water with 1 tsp of baking soda and salt and alcohol-free mouthwash.   -Emphasized adequate hand-hygiene and limited contact with people who are sick.   -Monitor and notify any bleeding in urine, stool, or sputum.  As well as unusual bleeding or bruising and stomach pain.   - Emphasized hydration with 4 16 oz bottles of water a day.    -Importance of moisturizing with fragrance free lotion to prevent skin rash and/or hand-foot syndrome.  -Call clinic if fever >100.4, shakes or chills, shortness of  breath, chest pain, uncontrolled vomiting or diarrhea, pain and tingling in the chest or arm, or just not feeling well.    -Plans to [facility] on 7/11/24 for MD visit, treatment start, labs same day - CBC, CMP, CEA.    DISCUSSION:    1.  A total of 60 minutes were spent in counseling today, in which 100% were face-to-face.  At today's therapy teaching session, we discussed the patient's cancer diagnosis as well as planned therapy regimen, protocol, side effects and toxicities.  A handout of each therapeutic agent in the regimen was provided and reviewed in detail.    2.  The following side effects were discussed but not limited to:    Fluorouracil Side Effects:  Allergic Reactions  Breathing Problems  Bruising  Chest Pain  Chills  Cough  Diarrhea  Fever  Hair Loss  Headache  Infection  Itching  Low Blood Counts  Mouth Sores  Nausea  Numbness  Pain  Rash  Stomach Upset  Swelling  Tingling  Vomiting    Leucovorin (Injection) Side Effects:  Allergic Reactions  Breathing Problems  Itching  Rash  Swelling    Oxaliplatin Side Effects:  Allergic Reactions  Anemia  Breathing Problems  Bruising  Chest Pain  Chills  Cough  Diarrhea  Dizziness  Feeling Faint  Fever  Gas  Hair Loss  Infection  Injury  Itching  Low Blood Counts  Mouth Sores  Muscle Pain  Nausea  Numbness  Pain  Rash  Swelling  Tingling  Vomiting                a.  Discussed the risk of infection while on therapy related to pancytopenia, specifically a decrease in their white blood cell count.  Instructed to contact our office for temperature >100.4 F, chills, sudden onset cough or shortness of breath, symptoms of a urinary tract infection.                b.  Discussed the risk of anemia. Instructed to contact our office for dizziness, heart palpitations, or extreme or sudden changes in weakness.                c.  Discussed the risk of thrombocytopenia, which increases the risk of bruising or bleeding.  Instructed the patient to contact our office for spontaneous  signs of bleeding, including nose bleeds, bleeding from the gums or mouth, blood in sputum, urine or stool and unusual or excessive bruising or rash.                d.  Discussed GI side effects including weight changes, changes in appetite, altered sense of taste, stomatitis, nausea, vomiting, diarrhea, constipation, and heartburn.                e.  Discussed  side effects including painful urination, changes in the amount of urination, possible urine color changes.  Discussed fertility issues and to prevent  pregnancy if of child bearing age.                f.  Discussed neurological side effects including the risk of peripheral neuropathy, either temporary or permanent.                g.  Discussed the potential for skin, hair, and nail changes.       3.  Instructed to contact our office for discussion of medication changes, the addition of vitamin and/or herbal supplementation as they may interact with some chemotherapy agents.    4.  Discussed dietary modifications and the need to maintain adequate caloric intake and proper oral hydration.  Recommended 64 ounces of fluid per day.    5.  Discussed anti-emetic protocol and bowel regimen protocol.    6.  Office contact information given including after hours number.  Discussed there is an oncologist on call 24/7, 365 days including weekends.  Provided primary nurse's information .    7.  In summary, the patient is in agreement with the plan of care.  Questions appeared to be answered to their satisfaction. Consented the patient to the treatment plan and the patient was educated on the planned duration of the treatment and schedule of the treatment administration. Copy to be scanned into the chart.    All questions answered to the satisfaction of the patient and family.     Follow up appointments given to patient.      KIRSTEN EUCEDA  PATIENT EDUCATOR  [facility]

## 2024-06-17 NOTE — PROGRESS NOTES
"Oncology Nutrition Evaluation      Shira Nair   1970    Oncology Provider:   Elizabeth Lejeune, MD    Reason for Visit:  New Treatment Education    Oncology/Hematology Diagnosis:   Metastatic Colon Adenocarcinoma - mets to liver and lung     Treatment Plan:  mFOLFOX    Treatment History:  none    Nutrition Recommendations:  1. Regular diet as tolerated. Avoid ingestion of cold foods/beverages during oxaliplatin infusion and for 4-5 days following.     Nutrition Assessment    6/17/24: This is a 54 y.o.female with a medical diagnosis of stg IV colon CA. She reports good appetite and po intake. Currently tolerating a regular diet. She is scheduled for diverting ostomy in near future 2/2 severe bowel stenosis, but states she has been given no diet restrictions by any of her physicians. No c/o n/v/c/d and wt has been stable.     Nutrition Factors Affecting Intake  none identified    PMHx: sandra    Allergies: Ketorolac, Tramadol, and Vancomycin analogues    Current Medications:    Current Outpatient Medications:     HYDROcodone-acetaminophen (NORCO)  mg per tablet, Take 1 tablet by mouth every 8 (eight) hours as needed., Disp: , Rfl:     loperamide (IMODIUM) 2 mg capsule, Take 1 capsule (2 mg total) by mouth 4 (four) times daily as needed for Diarrhea., Disp: 20 capsule, Rfl: 2    OLANZapine (ZYPREXA) 5 MG tablet, Take 1 tablet (5 mg total) by mouth nightly., Disp: 10 tablet, Rfl: 2    ondansetron (ZOFRAN) 8 MG tablet, Take 1 tablet (8 mg total) by mouth every 8 (eight) hours as needed for Nausea., Disp: 30 tablet, Rfl: 2    prochlorperazine (COMPAZINE) 10 MG tablet, Take 1 tablet (10 mg total) by mouth every 6 (six) hours as needed (Nausea)., Disp: 30 tablet, Rfl: 1    traZODone (DESYREL) 100 MG tablet, Take 2 tablets by mouth every evening., Disp: , Rfl:     Labs: no new    Anthropometrics    Height:   Ht Readings from Last 1 Encounters:   06/17/24 5' 10" (1.778 m)      Weight:   Wt Readings from Last " 1 Encounters:   06/17/24 72.6 kg (160 lb)        Usual Body Weight: 72.6 kg (160 lb)  % Weight Change: 0    BMI: 22.9 (normal)    Ideal Weight: 68.1 kg (150 lb)  % Ideal Weight: 107%      Nutrition Diagnosis    PES: Food and nutrition related knowledge deficit related to chronic illness as evidenced by lack of prior exposure to oncology nutrition. (resolved)     Nutrition Risk  low    Nutrition Intervention    Interventions(treatment strategy):  modified composition of meals/snacks and purpose of nutrition education    Education Provided: food safety guidelines and oxaliplatin nutrition therapy  Teaching Method: explanation and printed materials  Comprehension: verbalizes understanding  Barriers to Learning: none evident  Expected Compliance: good  Comments: All questions were answered and dietitian's contact information was provided.      Nutrition Monitoring and Evaluation    Ongoing monitoring not warranted at this time. Please consult RD prn.        Cecelia Pollock MS, RD, , LDN

## 2024-06-17 NOTE — PROGRESS NOTES
I met with Shira and her sister for her patient education appointment. I reviewed my role and discussed barriers to treatment and her emotional wellbeing. I referred her to the [facility].   
Patient education    
38.3

## 2024-06-25 ENCOUNTER — HOSPITAL ENCOUNTER (EMERGENCY)
Facility: HOSPITAL | Age: 54
Discharge: HOME | End: 2024-06-25
Payer: MEDICARE

## 2024-06-25 VITALS
TEMPERATURE: 99 F | WEIGHT: 160 LBS | OXYGEN SATURATION: 96 % | HEIGHT: 70 IN | HEART RATE: 86 BPM | BODY MASS INDEX: 22.9 KG/M2 | DIASTOLIC BLOOD PRESSURE: 96 MMHG | RESPIRATION RATE: 18 BRPM | SYSTOLIC BLOOD PRESSURE: 142 MMHG

## 2024-06-25 DIAGNOSIS — R10.30 LOWER ABDOMINAL PAIN: ICD-10-CM

## 2024-06-25 DIAGNOSIS — N30.00 ACUTE CYSTITIS WITHOUT HEMATURIA: ICD-10-CM

## 2024-06-25 DIAGNOSIS — K59.02 CONSTIPATION DUE TO OUTLET DYSFUNCTION: Primary | ICD-10-CM

## 2024-06-25 LAB
ALBUMIN SERPL-MCNC: 4.7 G/DL (ref 3.4–5)
ALBUMIN/GLOB SERPL: 1.3 RATIO
ALP SERPL-CCNC: 1187 UNIT/L (ref 50–144)
ALT SERPL-CCNC: 129 UNIT/L (ref 1–45)
AMPHET UR QL SCN: NEGATIVE
ANION GAP SERPL CALC-SCNC: 14 MEQ/L (ref 2–13)
AST SERPL-CCNC: 243 UNIT/L (ref 14–36)
BACTERIA #/AREA URNS AUTO: ABNORMAL /HPF
BARBITURATE SCN PRESENT UR: NEGATIVE
BASOPHILS # BLD AUTO: 0.01 X10(3)/MCL (ref 0.01–0.08)
BASOPHILS NFR BLD AUTO: 0.1 % (ref 0.1–1.2)
BENZODIAZ UR QL SCN: NEGATIVE
BILIRUB SERPL-MCNC: 1.5 MG/DL (ref 0–1)
BILIRUB UR QL STRIP.AUTO: ABNORMAL
BUN SERPL-MCNC: 17 MG/DL (ref 7–20)
CALCIUM SERPL-MCNC: 10.2 MG/DL (ref 8.4–10.2)
CANNABINOIDS UR QL SCN: NEGATIVE
CHLORIDE SERPL-SCNC: 99 MMOL/L (ref 98–110)
CK SERPL-CCNC: 134 U/L (ref 30–135)
CLARITY UR: CLEAR
CO2 SERPL-SCNC: 25 MMOL/L (ref 21–32)
COCAINE UR QL SCN: NEGATIVE
COLOR UR AUTO: ABNORMAL
CREAT SERPL-MCNC: 0.97 MG/DL (ref 0.66–1.25)
CREAT/UREA NIT SERPL: 18 (ref 12–20)
EOSINOPHIL # BLD AUTO: 0 X10(3)/MCL (ref 0.04–0.36)
EOSINOPHIL NFR BLD AUTO: 0 % (ref 0.7–7)
ERYTHROCYTE [DISTWIDTH] IN BLOOD BY AUTOMATED COUNT: 17.8 % (ref 11–14.5)
GFR SERPLBLD CREATININE-BSD FMLA CKD-EPI: 70 ML/MIN/1.73/M2
GLOBULIN SER-MCNC: 3.7 GM/DL (ref 2–3.9)
GLUCOSE SERPL-MCNC: 277 MG/DL (ref 70–115)
GLUCOSE UR QL STRIP: 500
HCT VFR BLD AUTO: 41.5 % (ref 36–48)
HGB BLD-MCNC: 13.1 G/DL (ref 11.8–16)
HGB UR QL STRIP: ABNORMAL
IMM GRANULOCYTES # BLD AUTO: 0.07 X10(3)/MCL (ref 0–0.03)
IMM GRANULOCYTES NFR BLD AUTO: 0.4 % (ref 0–0.5)
KETONES UR QL STRIP: NEGATIVE
LEUKOCYTE ESTERASE UR QL STRIP: NEGATIVE
LIPASE SERPL-CCNC: 60 U/L (ref 23–300)
LYMPHOCYTES # BLD AUTO: 1.22 X10(3)/MCL (ref 1.16–3.74)
LYMPHOCYTES NFR BLD AUTO: 7.3 % (ref 20–55)
MCH RBC QN AUTO: 25 PG (ref 27–34)
MCHC RBC AUTO-ENTMCNC: 31.6 G/DL (ref 31–37)
MCV RBC AUTO: 79.3 FL (ref 79–99)
METHADONE UR QL SCN: NEGATIVE
MONOCYTES # BLD AUTO: 1.68 X10(3)/MCL (ref 0.24–0.36)
MONOCYTES NFR BLD AUTO: 10.1 % (ref 4.7–12.5)
NEUTROPHILS # BLD AUTO: 13.7 X10(3)/MCL (ref 1.56–6.13)
NEUTROPHILS NFR BLD AUTO: 82.1 % (ref 37–73)
NITRITE UR QL STRIP: POSITIVE
OPIATES UR QL SCN: POSITIVE
PCP UR QL: NEGATIVE
PH UR STRIP: 6 [PH]
PH UR: 6 [PH] (ref 5–8)
PLATELET # BLD AUTO: 252 X10(3)/MCL (ref 140–371)
PMV BLD AUTO: 8.5 FL (ref 9.4–12.4)
POTASSIUM SERPL-SCNC: 3.9 MMOL/L (ref 3.5–5.1)
PROT SERPL-MCNC: 8.4 GM/DL (ref 6.3–8.2)
PROT UR QL STRIP: 100
RBC # BLD AUTO: 5.23 X10(6)/MCL (ref 4–5.1)
RBC #/AREA URNS AUTO: ABNORMAL /HPF
SODIUM SERPL-SCNC: 138 MMOL/L (ref 136–145)
SP GR UR STRIP.AUTO: 1.02 (ref 1–1.03)
SQUAMOUS #/AREA URNS AUTO: ABNORMAL /HPF
UROBILINOGEN UR STRIP-ACNC: 4
WBC # BLD AUTO: 16.68 X10(3)/MCL (ref 4–11.5)
WBC #/AREA URNS AUTO: ABNORMAL /HPF

## 2024-06-25 PROCEDURE — 85025 COMPLETE CBC W/AUTO DIFF WBC: CPT

## 2024-06-25 PROCEDURE — 25000003 PHARM REV CODE 250

## 2024-06-25 PROCEDURE — 87086 URINE CULTURE/COLONY COUNT: CPT

## 2024-06-25 PROCEDURE — 96375 TX/PRO/DX INJ NEW DRUG ADDON: CPT

## 2024-06-25 PROCEDURE — 80053 COMPREHEN METABOLIC PANEL: CPT

## 2024-06-25 PROCEDURE — 25500020 PHARM REV CODE 255

## 2024-06-25 PROCEDURE — 96372 THER/PROPH/DIAG INJ SC/IM: CPT

## 2024-06-25 PROCEDURE — 63600175 PHARM REV CODE 636 W HCPCS

## 2024-06-25 PROCEDURE — 96365 THER/PROPH/DIAG IV INF INIT: CPT | Mod: 59

## 2024-06-25 PROCEDURE — 83690 ASSAY OF LIPASE: CPT

## 2024-06-25 PROCEDURE — 81003 URINALYSIS AUTO W/O SCOPE: CPT

## 2024-06-25 PROCEDURE — 82550 ASSAY OF CK (CPK): CPT

## 2024-06-25 PROCEDURE — 81015 MICROSCOPIC EXAM OF URINE: CPT

## 2024-06-25 PROCEDURE — 99285 EMERGENCY DEPT VISIT HI MDM: CPT | Mod: 25

## 2024-06-25 PROCEDURE — 80307 DRUG TEST PRSMV CHEM ANLYZR: CPT

## 2024-06-25 RX ORDER — DICYCLOMINE HYDROCHLORIDE 20 MG/1
20 TABLET ORAL EVERY 6 HOURS PRN
Qty: 20 TABLET | Refills: 0 | Status: SHIPPED | OUTPATIENT
Start: 2024-06-25

## 2024-06-25 RX ORDER — NITROFURANTOIN 25; 75 MG/1; MG/1
100 CAPSULE ORAL 2 TIMES DAILY
Qty: 10 CAPSULE | Refills: 0 | Status: SHIPPED | OUTPATIENT
Start: 2024-06-25 | End: 2024-06-30

## 2024-06-25 RX ORDER — LACTULOSE 10 G/15ML
10 SOLUTION ORAL 2 TIMES DAILY
Qty: 600 ML | Refills: 0 | Status: SHIPPED | OUTPATIENT
Start: 2024-06-25 | End: 2024-07-15

## 2024-06-25 RX ORDER — DICYCLOMINE HYDROCHLORIDE 10 MG/ML
20 INJECTION INTRAMUSCULAR
Status: COMPLETED | OUTPATIENT
Start: 2024-06-25 | End: 2024-06-25

## 2024-06-25 RX ORDER — HYDROMORPHONE HYDROCHLORIDE 1 MG/ML
1 INJECTION, SOLUTION INTRAMUSCULAR; INTRAVENOUS; SUBCUTANEOUS
Status: COMPLETED | OUTPATIENT
Start: 2024-06-25 | End: 2024-06-25

## 2024-06-25 RX ORDER — LACTULOSE 10 G/15ML
30 SOLUTION ORAL
Status: COMPLETED | OUTPATIENT
Start: 2024-06-25 | End: 2024-06-25

## 2024-06-25 RX ORDER — PROCHLORPERAZINE EDISYLATE 5 MG/ML
10 INJECTION INTRAMUSCULAR; INTRAVENOUS
Status: COMPLETED | OUTPATIENT
Start: 2024-06-25 | End: 2024-06-25

## 2024-06-25 RX ADMIN — DEXTROSE MONOHYDRATE 2 G: 5 INJECTION INTRAVENOUS at 07:06

## 2024-06-25 RX ADMIN — LACTULOSE 30 G: 20 SOLUTION ORAL at 08:06

## 2024-06-25 RX ADMIN — IOHEXOL 90 ML: 300 INJECTION, SOLUTION INTRAVENOUS at 08:06

## 2024-06-25 RX ADMIN — PROCHLORPERAZINE EDISYLATE 10 MG: 5 INJECTION INTRAMUSCULAR; INTRAVENOUS at 07:06

## 2024-06-25 RX ADMIN — DICYCLOMINE HYDROCHLORIDE 20 MG: 10 INJECTION, SOLUTION INTRAMUSCULAR at 08:06

## 2024-06-25 RX ADMIN — HYDROMORPHONE HYDROCHLORIDE 1 MG: 1 INJECTION, SOLUTION INTRAMUSCULAR; INTRAVENOUS; SUBCUTANEOUS at 07:06

## 2024-06-26 NOTE — DISCHARGE INSTRUCTIONS
Take the lactulose every day  Meds, and Bentyl as needed for abdominal pain  Take the antibiotics as prescribed.

## 2024-06-26 NOTE — ED PROVIDER NOTES
Encounter Date: 6/25/2024       History     Chief Complaint   Patient presents with    Abdominal Pain    Leg Pain     Reports that she has been having abd pain since last night as well as bilat leg pain. Pmh of colon cancer. Has not yet started treatments.      54-year-old female presents complaining of lower abdominal pain, radiating up both sides of the abdomen since last night.  She also has soreness in her bilateral legs from her thighs all the way down to her feet.  She has metastatic colon cancer.  She has yet to start chemotherapy.  There was concern for impending intestinal obstruction.  Her medical records were reviewed in detail.  Notably, she does have a history of chronic pain.    The history is provided by the patient.     Review of patient's allergies indicates:   Allergen Reactions    Ketorolac Hives    Tramadol Hives    Vancomycin analogues Hives     Past Medical History:   Diagnosis Date    Brain tumor     Cervical ca     Depression     Malignant neoplasm of colon, unspecified     Opioid abuse      Past Surgical History:   Procedure Laterality Date    BRAIN SURGERY      CHOLECYSTECTOMY      COLONOSCOPY N/A 06/06/2024    Procedure: COLON;  Surgeon: Kevin Causey MD;  Location: [facility] ENDOSCOPY;  Service: Gastroenterology;  Laterality: N/A;    COLONOSCOPY, WITH 1 OR MORE BIOPSIES  06/06/2024    Procedure: COLONOSCOPY, WITH 1 OR MORE BIOPSIES;  Surgeon: Kevin Causey MD;  Location: [facility] ENDOSCOPY;  Service: Gastroenterology;;    COLONOSCOPY, WITH DIRECTED SUBMUCOSAL INJECTION  06/06/2024    Procedure: COLONOSCOPY, WITH DIRECTED SUBMUCOSAL INJECTION;  Surgeon: Kevin Causey MD;  Location: [facility] ENDOSCOPY;  Service: Gastroenterology;;    EGD, WITH CLOSED BIOPSY  06/06/2024    Procedure: EGD, WITH CLOSED BIOPSY;  Surgeon: Kevin Causey MD;  Location: [facility] ENDOSCOPY;  Service: Gastroenterology;;    ESOPHAGOGASTRODUODENOSCOPY N/A 06/06/2024     Procedure: DOUBLE;  Surgeon: Kevin Causey MD;  Location: [facility] ENDOSCOPY;  Service: Gastroenterology;  Laterality: N/A;    HYSTERECTOMY      KNEE SURGERY      THYROID LOBECTOMY       Family History   Problem Relation Name Age of Onset    Hypertension Mother      Hypertension Father      Stroke Father      Pancreatic cancer Brother      Cancer Paternal Grandfather       Social History     Tobacco Use    Smoking status: Never    Smokeless tobacco: Never   Substance Use Topics    Alcohol use: Never    Drug use: Not Currently     Types: Cocaine     Comment: 5-6 YEARS QUIT ON SUBOXONE     Review of Systems   Constitutional:  Negative for fever.   HENT:  Negative for sore throat.    Respiratory:  Negative for shortness of breath.    Cardiovascular:  Negative for chest pain.   Gastrointestinal:  Positive for abdominal pain. Negative for nausea.   Genitourinary:  Negative for dysuria.   Musculoskeletal:  Negative for back pain.        Pain in both legs   Skin:  Negative for rash.   Neurological:  Negative for weakness.   Hematological:  Does not bruise/bleed easily.   All other systems reviewed and are negative.      Physical Exam     Initial Vitals [06/25/24 1738]   BP Pulse Resp Temp SpO2   136/87 109 18 98.6 °F (37 °C) 100 %      MAP       --         Physical Exam    Nursing note and vitals reviewed.  Constitutional: Vital signs are normal. She appears well-developed and well-nourished. She is cooperative.   Patient appears well/comfortable   HENT:   Head: Normocephalic and atraumatic.   Mouth/Throat: Oropharynx is clear and moist.   Eyes: Conjunctivae, EOM and lids are normal. Pupils are equal, round, and reactive to light.   Neck: Trachea normal. Neck supple.   Normal range of motion.  Cardiovascular:  Normal rate, regular rhythm, normal heart sounds and intact distal pulses.           Pulmonary/Chest: Breath sounds normal.   Abdominal: Abdomen is soft. Bowel sounds are normal. She exhibits no distension  and no mass. There is no abdominal tenderness. There is no rebound and no guarding.   Musculoskeletal:         General: No tenderness or edema. Normal range of motion.      Cervical back: Normal, normal range of motion and neck supple.      Lumbar back: Normal.     Neurological: She is alert and oriented to person, place, and time. She has normal strength. Coordination normal. GCS score is 15. GCS eye subscore is 4. GCS verbal subscore is 5. GCS motor subscore is 6.   Skin: Skin is warm, dry and intact. Capillary refill takes less than 2 seconds.   Psychiatric: She has a normal mood and affect. Her speech is normal and behavior is normal. Judgment and thought content normal. Cognition and memory are normal.         ED Course   Procedures  Labs Reviewed   COMPREHENSIVE METABOLIC PANEL - Abnormal; Notable for the following components:       Result Value    Glucose 277 (*)     Protein Total 8.4 (*)     Bilirubin Total 1.5 (*)     ALP 1,187 (*)      (*)      (*)     Anion Gap 14.0 (*)     All other components within normal limits   URINALYSIS, REFLEX TO URINE CULTURE - Abnormal; Notable for the following components:    Color, UA Orange (*)     Protein,  (*)     Glucose,  (*)     Blood, UA Small (*)     Bilirubin, UA Small (*)     Urobilinogen, UA 4.0 (*)     Nitrites, UA Positive (*)     All other components within normal limits    Narrative:      URINE STABILITY IS 2 HOURS AT ROOM TEMP OR    SIX HOURS REFRIGERATED. PERFORMING TESTING ON    SPECIMENS GREATER THAN THIS AGE MAY AFFECT THE    FOLLOWING TESTS:    PH          SPECIFIC GRAVITY           BLOOD    CLARITY     BILIRUBIN               UROBILINOGEN   CBC WITH DIFFERENTIAL - Abnormal; Notable for the following components:    WBC 16.68 (*)     RBC 5.23 (*)     MCH 25.0 (*)     RDW 17.8 (*)     MPV 8.5 (*)     Neut % 82.1 (*)     Lymph % 7.3 (*)     Eos % 0.0 (*)     Neut # 13.70 (*)     Mono # 1.68 (*)     Eos # 0.00 (*)     IG# 0.07 (*)      All other components within normal limits   DRUG SCREEN, URINE (BEAKER) - Abnormal; Notable for the following components:    Opiates, Urine Positive (*)     All other components within normal limits    Narrative:     Cut off concentrations:    Amphetamines - 1000 ng/ml  Barbiturates - 200 ng/ml  Benzodiazepine - 200 ng/ml  Cannabinoids (THC) - 50 ng/ml  Cocaine - 300 ng/ml  Fentanyl - 1.0 ng/ml  MDMA - 500 ng/ml  Opiates - 300 ng/ml   Phencyclidine (PCP) - 25 ng/ml  Methadone - 300 ng/ml      False negatives may result form substances such as bleach added to urine.  False positives may result for the presence of a substance with similar chemical structure to the drug or its metabolite.    This test provides only a PRELIMINARY analytical test result. A more specific alternate chemical method must be used in order to obtain a confirmed analytical result. Gas chromatography/mass spectrometry (GC/MS) is the preferred confirmatory method. Other chemical confirmation methods are available. Clinical consideration and professional judgement should be applied to any drug of abuse test result, particularly when preliminary positive results are used.    Positive results will be confirmed only at the physicians request. Unconfirmed screening results are to be used only for medical purposes (treatment).          URINALYSIS, MICROSCOPIC - Abnormal; Notable for the following components:    Bacteria, UA Moderate (*)     WBC, UA 11-20 (*)     All other components within normal limits   LIPASE - Normal   CK - Normal   CULTURE, URINE   CBC W/ AUTO DIFFERENTIAL    Narrative:     The following orders were created for panel order CBC W/ AUTO DIFFERENTIAL.  Procedure                               Abnormality         Status                     ---------                               -----------         ------                     CBC with Differential[[id number]]       Abnormal            Final result                 Please view results  for these tests on the individual orders.          Imaging Results              CT Abdomen Pelvis With IV Contrast NO Oral Contrast (Final result)  Result time 06/25/24 20:36:03      Final result by Joe Osuna MD (06/25/24 20:36:03)                   Impression:        1. The patient shows findings compatible with metastatic disease.    2.  A large amount of fecal content in the colon which can be seen with constipation.  Several loops of small bowel are a filled with fluid in the lower abdomen and pelvic region.  There is apparent localized bowel wall thickening of the descending colon, possibly related to the patient's history of of colonic cancer.    3.  A 7-8 mm radiopaque nonobstructing calculus involving the lower pole calyx of the left kidney.    n/a    CATEGORY: n/a    The following dose reduction techniques are used for all CT at [facility]:    1.   Automated exposure control.    2.   Adjustment of the mA and/or kV according to patient size.    3.   Use of iterative reconstruction technique.      Electronically signed by: Joe Osuna  Date:    06/25/2024  Time:    20:36               Narrative:    EXAMINATION:  CT ABDOMEN PELVIS WITH IV CONTRAST    CLINICAL HISTORY:  Abdominal pain, acute, nonlocalized;Bowel obstruction suspected;    TECHNIQUE:  Low dose axial images, sagittal and coronal reformations were obtained from the lung bases to the pubic symphysis following the IV administration of 90 mL of Omnipaque 300 .  Oral contrast was not given.    COMPARISON:  05/13/2024    FINDINGS:  Liver:  The liver is riddled with multiple low-density lesions of varying sizes (the largest measuring between 6 and 7 cm) having the appearance of metastatic disease.    Gallbladder/Biliary System:  Postsurgical findings suggesting a previous cholecystectomy.    Spleen:  No clinically significant abnormalities noted.    Adrenal glands:  No clinically significant abnormalities  noted.    Pancreas:  The pancreas is atrophic.    Kidneys/Urinary Tract:  A 7-8 mm radiopaque nonobstructing calculus is present in the lower pole region of the left kidney.    Urinary bladder:  No clinically significant abnormalities noted.    Prostate gland/uterus and ovaries:  Compatible with a previous hysterectomy.    GI tract:  There is a significant amount of formed stool in the colon which can be seen with constipation.  There is localized thickening of the colonic wall of the descending colon.  Several loops of fluid-filled small bowel are noted in the pelvic and lower abdominal region.    Vascular structures:  No clinically significant abnormalities noted.    Musculoskeletal structures:  Mild bony demineralization is present with mild to moderate degenerative findings noted throughout the spine.    Miscellaneous: There is what appears to be a shunt tube in the upper abdomen, probably representing a ventriculoperitoneal shunt                                       Medications   lactulose 20 gram/30 mL solution Soln 30 g (has no administration in time range)   dicyclomine injection 20 mg (has no administration in time range)   HYDROmorphone injection 1 mg (1 mg Intravenous Given 6/25/24 1917)   prochlorperazine injection Soln 10 mg (10 mg Intravenous Given 6/25/24 1916)   cefTRIAXone (ROCEPHIN) 2 g in D5W 100 mL IVPB (MB+) (2 g Intravenous New Bag 6/25/24 1957)   iohexoL (OMNIPAQUE 300) injection 100 mL (90 mLs Intravenous Given 6/25/24 2013)     Medical Decision Making  Bilateral lower abdominal pain, radiating to midabdomen on both sides, bilateral anterior leg pain from thigh to foot, metastatic colon CA, chronic pain  Differential diagnosis:  Obstruction, perforation, constipation, abscess, vascular abnormality, rhabdomyolysis, dehydration, electrolyte abnormality, exacerbation of chronic pain  Analgesia, IV fluids  Labs, CT        Amount and/or Complexity of Data Reviewed  Labs: ordered. Decision-making  details documented in ED Course.  Radiology: ordered. Decision-making details documented in ED Course.    Risk  Prescription drug management.               ED Course as of 06/25/24 2042 Tue Jun 25, 2024 1945 CBC W/ AUTO DIFFERENTIAL(!)  There has a leukocytosis, and her H and H is considerably higher versus a month ago.  This may indicate hemo concentration. [TM]   1947 Urinalysis, Microscopic(!)  Consistent with a UTI [TM]   2003 Comp. Metabolic Panel(!)  Transaminitis, hyperglycemia [TM]   2039 CT Abdomen Pelvis With IV Contrast NO Oral Contrast  The only acute findings on the CT is constipation.  No obstruction, no perforation [TM]      ED Course User Index  [TM] Guido Nelson MD                           Clinical Impression:  Final diagnoses:  [R10.30] Lower abdominal pain  [N30.00] Acute cystitis without hematuria  [K59.02] Constipation due to outlet dysfunction (Primary)          ED Disposition Condition    Discharge Stable          ED Prescriptions       Medication Sig Dispense Start Date End Date Auth. Provider    nitrofurantoin, macrocrystal-monohydrate, (MACROBID) 100 MG capsule Take 1 capsule (100 mg total) by mouth 2 (two) times daily. for 5 days 10 capsule 6/25/2024 6/30/2024 Guido Nelson MD    dicyclomine (BENTYL) 20 mg tablet Take 1 tablet (20 mg total) by mouth every 6 (six) hours as needed (Abdominal Pain). 20 tablet 6/25/2024 -- Guido Nelson MD    lactulose (CHRONULAC) 20 gram/30 mL Soln Take 15 mLs (10 g total) by mouth 2 (two) times daily. for 20 days 600 mL 6/25/2024 7/15/2024 Guido Nelson MD          Follow-up Information       Follow up With Specialties Details Why Contact Info    Amado Baig III, MD Family Medicine Call in 1 day  [address] [address]  [phone]               Guido Nelson MD  06/25/24 2042

## 2024-06-27 ENCOUNTER — HOSPITAL ENCOUNTER (OUTPATIENT)
Dept: RADIOLOGY | Facility: HOSPITAL | Age: 54
Discharge: HOME | End: 2024-06-27
Attending: STUDENT IN AN ORGANIZED HEALTH CARE EDUCATION/TRAINING PROGRAM
Payer: MEDICARE

## 2024-06-27 DIAGNOSIS — C18.9 METASTATIC COLON CANCER TO LIVER: ICD-10-CM

## 2024-06-27 DIAGNOSIS — C22.0 LIVER CARCINOMA: ICD-10-CM

## 2024-06-27 DIAGNOSIS — Z45.2 ADMISSION FOR FITTING OF PORT-A-CATH: ICD-10-CM

## 2024-06-27 DIAGNOSIS — C78.7 METASTATIC COLON CANCER TO LIVER: ICD-10-CM

## 2024-06-27 PROCEDURE — A9552 F18 FDG: HCPCS | Performed by: STUDENT IN AN ORGANIZED HEALTH CARE EDUCATION/TRAINING PROGRAM

## 2024-06-27 PROCEDURE — 78815 PET IMAGE W/CT SKULL-THIGH: CPT | Mod: TC

## 2024-06-27 RX ORDER — FLUDEOXYGLUCOSE F18 500 MCI/ML
10 INJECTION INTRAVENOUS
Status: COMPLETED | OUTPATIENT
Start: 2024-06-27 | End: 2024-06-27

## 2024-06-27 RX ADMIN — FLUDEOXYGLUCOSE F-18 11 MILLICURIE: 500 INJECTION INTRAVENOUS at 11:06

## 2024-06-28 LAB
BACTERIA UR CULT: ABNORMAL

## 2024-07-08 NOTE — H&P (VIEW-ONLY)
History & Physical    CHIEF COMPLAINT:  METASTATIC COLON CANCER    History of Present Illness:  54 year-old-female referred by Dr. Lejeune.  Patient presented to the ER in May with complaints of abdominal pain.  CT abd/pel with contrast showed changes most consistent with metastatic disease to the liver.  CT chest without contrast showed small indeterminate pulmonary nodules which were not seen in 2017.  IR then performed liver biopsy, pathology revealed necrotic carcinoma.  PET scan showed hepatic extensive metastatic involvement with hypermetabolic masses; descending colon concentric mural thickening and hypermetabolism is suggested to be the focus of colon carcinoma.  Colonoscopy performed, a malignant-appearing, intrinsic severe stenosis measuring of unknown length x 4 mm (inner diameter) was found in the mid descending colon and was non-traversed. Biopsies were taken with a cold forceps for histology. For location marking, one hemostatic clip was successfully placed.  Biopsy of the colon stricture revealed fragments of colonic-type mucosa with at least high grade dysplasia, suspicious for adenocarcinoma.  .58,  302.04,  58.4 and AFP <2.00.  Patient has a history of cervical cancer x 15 years ago.  At the end of June liver enzymes were elevated: total bilirubin 1.5, ALP 1187,  and .     The patient tolerated colon prep without difficulty, denies abdominal cramping nausea or vomiting.  She is taking stool softeners for bowel movements and having these regularly.    61 minutes was required for complete chart review, imaging review including interpretation of imaging, coordination with referring/other physicians, patient counseling regarding diagnosis/treatment plan, answering questions, medical decision making, and documentation.     Visit today included increased complexity associated with the care of the episodic problem colon cancer with liver metastasis addressed and managing  the longitudinal care of the patient due to the serious and/or complex managed problem(s) .       Review of patient's allergies indicates:   Allergen Reactions    Ketorolac Hives    Tramadol Hives    Vancomycin analogues Hives       Current Outpatient Medications   Medication Sig Dispense Refill    dicyclomine (BENTYL) 20 mg tablet Take 1 tablet (20 mg total) by mouth every 6 (six) hours as needed (Abdominal Pain). 20 tablet 0    HYDROcodone-acetaminophen (NORCO)  mg per tablet Take 1 tablet by mouth every 8 (eight) hours as needed.      lactulose (CHRONULAC) 20 gram/30 mL Soln Take 15 mLs (10 g total) by mouth 2 (two) times daily. for 20 days 600 mL 0    loperamide (IMODIUM) 2 mg capsule Take 1 capsule (2 mg total) by mouth 4 (four) times daily as needed for Diarrhea. 20 capsule 2    OLANZapine (ZYPREXA) 5 MG tablet Take 1 tablet (5 mg total) by mouth nightly. 10 tablet 2    ondansetron (ZOFRAN) 8 MG tablet Take 1 tablet (8 mg total) by mouth every 8 (eight) hours as needed for Nausea. 30 tablet 2    prochlorperazine (COMPAZINE) 10 MG tablet Take 1 tablet (10 mg total) by mouth every 6 (six) hours as needed (Nausea). 30 tablet 1    traZODone (DESYREL) 100 MG tablet Take 2 tablets by mouth every evening.       No current facility-administered medications for this visit.       Past Medical History:   Diagnosis Date    Brain tumor     Cervical ca     Depression     Malignant neoplasm of colon, unspecified     Opioid abuse      Past Surgical History:   Procedure Laterality Date    BRAIN SURGERY      CHOLECYSTECTOMY      COLONOSCOPY N/A 06/06/2024    Procedure: COLON;  Surgeon: Kevin Causey MD;  Location: [facility] ENDOSCOPY;  Service: Gastroenterology;  Laterality: N/A;    COLONOSCOPY, WITH 1 OR MORE BIOPSIES  06/06/2024    Procedure: COLONOSCOPY, WITH 1 OR MORE BIOPSIES;  Surgeon: Kevin Causey MD;  Location: [facility] ENDOSCOPY;  Service: Gastroenterology;;    COLONOSCOPY, WITH DIRECTED  SUBMUCOSAL INJECTION  06/06/2024    Procedure: COLONOSCOPY, WITH DIRECTED SUBMUCOSAL INJECTION;  Surgeon: Kevin Causey MD;  Location: [facility] ENDOSCOPY;  Service: Gastroenterology;;    EGD, WITH CLOSED BIOPSY  06/06/2024    Procedure: EGD, WITH CLOSED BIOPSY;  Surgeon: Kevin Causey MD;  Location: [facility] ENDOSCOPY;  Service: Gastroenterology;;    ESOPHAGOGASTRODUODENOSCOPY N/A 06/06/2024    Procedure: DOUBLE;  Surgeon: Kevin Causey MD;  Location: [facility] ENDOSCOPY;  Service: Gastroenterology;  Laterality: N/A;    HYSTERECTOMY      KNEE SURGERY      THYROID LOBECTOMY       Family History   Problem Relation Name Age of Onset    Hypertension Mother      Hypertension Father      Stroke Father      Pancreatic cancer Brother      Cancer Paternal Grandfather       Social History     Tobacco Use    Smoking status: Never    Smokeless tobacco: Never   Substance Use Topics    Alcohol use: Never    Drug use: Not Currently     Types: Cocaine     Comment: 5-6 YEARS QUIT ON SUBOXONE        Review of Systems:  Review of Systems   Constitutional:  Negative for appetite change, chills, diaphoresis and fever.   HENT:  Negative for congestion, drooling, ear discharge, ear pain and hearing loss.    Eyes:  Negative for discharge.   Respiratory:  Negative for apnea, cough, choking, chest tightness, shortness of breath and stridor.    Cardiovascular:  Negative for chest pain, palpitations and leg swelling.   Endocrine: Negative for cold intolerance and heat intolerance.   Genitourinary:  Negative for difficulty urinating, dyspareunia, dysuria and hematuria.   Musculoskeletal:  Negative for arthralgias, gait problem and joint swelling.   Skin:  Negative for color change and rash.   Neurological:  Negative for dizziness, tremors, seizures, syncope, facial asymmetry, speech difficulty, light-headedness, numbness and headaches.   Psychiatric/Behavioral:  Negative for agitation and confusion.            Objective     Vital Signs (Most Recent)              Physical Exam:  Physical Exam  Constitutional:       General: She is not in acute distress.     Appearance: Normal appearance. She is not toxic-appearing.   HENT:      Head: Normocephalic and atraumatic.      Right Ear: External ear normal.      Left Ear: External ear normal.      Mouth/Throat:      Mouth: Mucous membranes are moist.   Eyes:      General: No scleral icterus.     Conjunctiva/sclera: Conjunctivae normal.      Pupils: Pupils are equal, round, and reactive to light.   Cardiovascular:      Rate and Rhythm: Normal rate and regular rhythm.      Pulses: Normal pulses.   Pulmonary:      Effort: Pulmonary effort is normal. No respiratory distress.      Breath sounds: Normal breath sounds. No stridor. No wheezing or rhonchi.   Abdominal:      General: Abdomen is flat. There is no distension.      Palpations: Abdomen is soft. There is no mass.      Tenderness: There is no abdominal tenderness. There is no guarding or rebound.      Hernia: No hernia is present.   Musculoskeletal:         General: No swelling or tenderness. Normal range of motion.      Cervical back: Normal range of motion and neck supple.      Right lower leg: No edema.      Left lower leg: No edema.   Skin:     General: Skin is warm.      Capillary Refill: Capillary refill takes less than 2 seconds.      Coloration: Skin is not jaundiced or pale.      Findings: No erythema or rash.   Neurological:      General: No focal deficit present.      Mental Status: She is alert and oriented to person, place, and time.      Gait: Gait normal.   Psychiatric:         Mood and Affect: Mood normal.         Behavior: Behavior normal.         Judgment: Judgment normal.              Assessment and Plan     Colon cancer of the descending colon with unresectable liver metastasis, relatively bulky disease within the liver.  Biopsy of the liver was equivocal by IR.  No obstructive symptoms, tolerated colon prep  for colonoscopy, no evidence of obstruction on imaging    Diagnosis, staging, prognosis and treatment guidelines for colon cancer with unresectable liver metastasis were discussed with the patient in detail, all questions were addressed.    No role for diversion at this time, no obstructive symptoms or imaging suggestion of obstruction.  Most pressing issue is obtaining firm diagnosis as well as beginning chemotherapy.    Low residue diet, continue stool softeners    Scheduled diagnostic laparoscopy, ultrasound-guided biopsy of the liver, MediPort placement    The risks and benefits of the procedure were explained in detail using layman terms, including the pros and cons of any implant that may be used, all questions were addressed, the patient gives consent to proceed    Rubio Cast MD  Surgical Oncology  Complex General, Gastrointestinal and Hepatobiliary Surgery

## 2024-07-08 NOTE — PROGRESS NOTES
History & Physical    CHIEF COMPLAINT:  METASTATIC COLON CANCER    History of Present Illness:  54 year-old-female referred by Dr. Lejeune.  Patient presented to the ER in May with complaints of abdominal pain.  CT abd/pel with contrast showed changes most consistent with metastatic disease to the liver.  CT chest without contrast showed small indeterminate pulmonary nodules which were not seen in 2017.  IR then performed liver biopsy, pathology revealed necrotic carcinoma.  PET scan showed hepatic extensive metastatic involvement with hypermetabolic masses; descending colon concentric mural thickening and hypermetabolism is suggested to be the focus of colon carcinoma.  Colonoscopy performed, a malignant-appearing, intrinsic severe stenosis measuring of unknown length x 4 mm (inner diameter) was found in the mid descending colon and was non-traversed. Biopsies were taken with a cold forceps for histology. For location marking, one hemostatic clip was successfully placed.  Biopsy of the colon stricture revealed fragments of colonic-type mucosa with at least high grade dysplasia, suspicious for adenocarcinoma.  .58,  302.04,  58.4 and AFP <2.00.  Patient has a history of cervical cancer x 15 years ago.  At the end of June liver enzymes were elevated: total bilirubin 1.5, ALP 1187,  and .     The patient tolerated colon prep without difficulty, denies abdominal cramping nausea or vomiting.  She is taking stool softeners for bowel movements and having these regularly.    61 minutes was required for complete chart review, imaging review including interpretation of imaging, coordination with referring/other physicians, patient counseling regarding diagnosis/treatment plan, answering questions, medical decision making, and documentation.     Visit today included increased complexity associated with the care of the episodic problem colon cancer with liver metastasis addressed and managing  the longitudinal care of the patient due to the serious and/or complex managed problem(s) .       Review of patient's allergies indicates:   Allergen Reactions    Ketorolac Hives    Tramadol Hives    Vancomycin analogues Hives       Current Outpatient Medications   Medication Sig Dispense Refill    dicyclomine (BENTYL) 20 mg tablet Take 1 tablet (20 mg total) by mouth every 6 (six) hours as needed (Abdominal Pain). 20 tablet 0    HYDROcodone-acetaminophen (NORCO)  mg per tablet Take 1 tablet by mouth every 8 (eight) hours as needed.      lactulose (CHRONULAC) 20 gram/30 mL Soln Take 15 mLs (10 g total) by mouth 2 (two) times daily. for 20 days 600 mL 0    loperamide (IMODIUM) 2 mg capsule Take 1 capsule (2 mg total) by mouth 4 (four) times daily as needed for Diarrhea. 20 capsule 2    OLANZapine (ZYPREXA) 5 MG tablet Take 1 tablet (5 mg total) by mouth nightly. 10 tablet 2    ondansetron (ZOFRAN) 8 MG tablet Take 1 tablet (8 mg total) by mouth every 8 (eight) hours as needed for Nausea. 30 tablet 2    prochlorperazine (COMPAZINE) 10 MG tablet Take 1 tablet (10 mg total) by mouth every 6 (six) hours as needed (Nausea). 30 tablet 1    traZODone (DESYREL) 100 MG tablet Take 2 tablets by mouth every evening.       No current facility-administered medications for this visit.       Past Medical History:   Diagnosis Date    Brain tumor     Cervical ca     Depression     Malignant neoplasm of colon, unspecified     Opioid abuse      Past Surgical History:   Procedure Laterality Date    BRAIN SURGERY      CHOLECYSTECTOMY      COLONOSCOPY N/A 06/06/2024    Procedure: COLON;  Surgeon: Kevin Causey MD;  Location: [facility] ENDOSCOPY;  Service: Gastroenterology;  Laterality: N/A;    COLONOSCOPY, WITH 1 OR MORE BIOPSIES  06/06/2024    Procedure: COLONOSCOPY, WITH 1 OR MORE BIOPSIES;  Surgeon: Kevin Causey MD;  Location: [facility] ENDOSCOPY;  Service: Gastroenterology;;    COLONOSCOPY, WITH DIRECTED  SUBMUCOSAL INJECTION  06/06/2024    Procedure: COLONOSCOPY, WITH DIRECTED SUBMUCOSAL INJECTION;  Surgeon: Kevin Causey MD;  Location: [facility] ENDOSCOPY;  Service: Gastroenterology;;    EGD, WITH CLOSED BIOPSY  06/06/2024    Procedure: EGD, WITH CLOSED BIOPSY;  Surgeon: Kevin Causey MD;  Location: [facility] ENDOSCOPY;  Service: Gastroenterology;;    ESOPHAGOGASTRODUODENOSCOPY N/A 06/06/2024    Procedure: DOUBLE;  Surgeon: Kevin Causey MD;  Location: [facility] ENDOSCOPY;  Service: Gastroenterology;  Laterality: N/A;    HYSTERECTOMY      KNEE SURGERY      THYROID LOBECTOMY       Family History   Problem Relation Name Age of Onset    Hypertension Mother      Hypertension Father      Stroke Father      Pancreatic cancer Brother      Cancer Paternal Grandfather       Social History     Tobacco Use    Smoking status: Never    Smokeless tobacco: Never   Substance Use Topics    Alcohol use: Never    Drug use: Not Currently     Types: Cocaine     Comment: 5-6 YEARS QUIT ON SUBOXONE        Review of Systems:  Review of Systems   Constitutional:  Negative for appetite change, chills, diaphoresis and fever.   HENT:  Negative for congestion, drooling, ear discharge, ear pain and hearing loss.    Eyes:  Negative for discharge.   Respiratory:  Negative for apnea, cough, choking, chest tightness, shortness of breath and stridor.    Cardiovascular:  Negative for chest pain, palpitations and leg swelling.   Endocrine: Negative for cold intolerance and heat intolerance.   Genitourinary:  Negative for difficulty urinating, dyspareunia, dysuria and hematuria.   Musculoskeletal:  Negative for arthralgias, gait problem and joint swelling.   Skin:  Negative for color change and rash.   Neurological:  Negative for dizziness, tremors, seizures, syncope, facial asymmetry, speech difficulty, light-headedness, numbness and headaches.   Psychiatric/Behavioral:  Negative for agitation and confusion.            Objective     Vital Signs (Most Recent)              Physical Exam:  Physical Exam  Constitutional:       General: She is not in acute distress.     Appearance: Normal appearance. She is not toxic-appearing.   HENT:      Head: Normocephalic and atraumatic.      Right Ear: External ear normal.      Left Ear: External ear normal.      Mouth/Throat:      Mouth: Mucous membranes are moist.   Eyes:      General: No scleral icterus.     Conjunctiva/sclera: Conjunctivae normal.      Pupils: Pupils are equal, round, and reactive to light.   Cardiovascular:      Rate and Rhythm: Normal rate and regular rhythm.      Pulses: Normal pulses.   Pulmonary:      Effort: Pulmonary effort is normal. No respiratory distress.      Breath sounds: Normal breath sounds. No stridor. No wheezing or rhonchi.   Abdominal:      General: Abdomen is flat. There is no distension.      Palpations: Abdomen is soft. There is no mass.      Tenderness: There is no abdominal tenderness. There is no guarding or rebound.      Hernia: No hernia is present.   Musculoskeletal:         General: No swelling or tenderness. Normal range of motion.      Cervical back: Normal range of motion and neck supple.      Right lower leg: No edema.      Left lower leg: No edema.   Skin:     General: Skin is warm.      Capillary Refill: Capillary refill takes less than 2 seconds.      Coloration: Skin is not jaundiced or pale.      Findings: No erythema or rash.   Neurological:      General: No focal deficit present.      Mental Status: She is alert and oriented to person, place, and time.      Gait: Gait normal.   Psychiatric:         Mood and Affect: Mood normal.         Behavior: Behavior normal.         Judgment: Judgment normal.              Assessment and Plan     Colon cancer of the descending colon with unresectable liver metastasis, relatively bulky disease within the liver.  Biopsy of the liver was equivocal by IR.  No obstructive symptoms, tolerated colon prep  for colonoscopy, no evidence of obstruction on imaging    Diagnosis, staging, prognosis and treatment guidelines for colon cancer with unresectable liver metastasis were discussed with the patient in detail, all questions were addressed.    No role for diversion at this time, no obstructive symptoms or imaging suggestion of obstruction.  Most pressing issue is obtaining firm diagnosis as well as beginning chemotherapy.    Low residue diet, continue stool softeners    Scheduled diagnostic laparoscopy, ultrasound-guided biopsy of the liver, MediPort placement    The risks and benefits of the procedure were explained in detail using layman terms, including the pros and cons of any implant that may be used, all questions were addressed, the patient gives consent to proceed    Rubio Cast MD  Surgical Oncology  Complex General, Gastrointestinal and Hepatobiliary Surgery

## 2024-07-10 ENCOUNTER — LAB VISIT (OUTPATIENT)
Dept: LAB | Facility: HOSPITAL | Age: 54
End: 2024-07-10
Attending: SURGERY
Payer: MEDICARE

## 2024-07-10 ENCOUNTER — OFFICE VISIT (OUTPATIENT)
Dept: SURGICAL ONCOLOGY | Facility: CLINIC | Age: 54
End: 2024-07-10
Payer: MEDICARE

## 2024-07-10 VITALS
BODY MASS INDEX: 21.33 KG/M2 | SYSTOLIC BLOOD PRESSURE: 114 MMHG | WEIGHT: 152.38 LBS | HEIGHT: 71 IN | HEART RATE: 84 BPM | DIASTOLIC BLOOD PRESSURE: 76 MMHG

## 2024-07-10 DIAGNOSIS — C78.7 METASTATIC COLON CANCER TO LIVER: ICD-10-CM

## 2024-07-10 DIAGNOSIS — C18.9 METASTATIC COLON CANCER TO LIVER: ICD-10-CM

## 2024-07-10 DIAGNOSIS — C78.7 METASTATIC COLON CANCER TO LIVER: Primary | ICD-10-CM

## 2024-07-10 DIAGNOSIS — Z45.2 ADMISSION FOR FITTING OF PORT-A-CATH: ICD-10-CM

## 2024-07-10 DIAGNOSIS — C22.0 LIVER CARCINOMA: ICD-10-CM

## 2024-07-10 DIAGNOSIS — C18.9 METASTATIC COLON CANCER TO LIVER: Primary | ICD-10-CM

## 2024-07-10 LAB
ALBUMIN SERPL-MCNC: 3.3 G/DL (ref 3.5–5)
ALBUMIN/GLOB SERPL: 0.8 RATIO (ref 1.1–2)
ALP SERPL-CCNC: 1019 UNIT/L (ref 40–150)
ALT SERPL-CCNC: 13 UNIT/L (ref 0–55)
ANION GAP SERPL CALC-SCNC: 12 MEQ/L
AST SERPL-CCNC: 23 UNIT/L (ref 5–34)
BILIRUB SERPL-MCNC: 0.5 MG/DL
BUN SERPL-MCNC: 22.6 MG/DL (ref 9.8–20.1)
CALCIUM SERPL-MCNC: 9.8 MG/DL (ref 8.4–10.2)
CHLORIDE SERPL-SCNC: 102 MMOL/L (ref 98–107)
CO2 SERPL-SCNC: 26 MMOL/L (ref 22–29)
CREAT SERPL-MCNC: 1.15 MG/DL (ref 0.55–1.02)
CREAT/UREA NIT SERPL: 20
ERYTHROCYTE [DISTWIDTH] IN BLOOD BY AUTOMATED COUNT: 18.2 % (ref 11.5–17)
GFR SERPLBLD CREATININE-BSD FMLA CKD-EPI: 57 ML/MIN/1.73/M2
GLOBULIN SER-MCNC: 3.9 GM/DL (ref 2.4–3.5)
GLUCOSE SERPL-MCNC: 128 MG/DL (ref 74–100)
HCT VFR BLD AUTO: 35.3 % (ref 37–47)
HGB BLD-MCNC: 10.4 G/DL (ref 12–16)
MCH RBC QN AUTO: 25.1 PG (ref 27–31)
MCHC RBC AUTO-ENTMCNC: 29.5 G/DL (ref 33–36)
MCV RBC AUTO: 85.3 FL (ref 80–94)
NRBC BLD AUTO-RTO: 0 %
PLATELET # BLD AUTO: 414 X10(3)/MCL (ref 130–400)
PMV BLD AUTO: 9 FL (ref 7.4–10.4)
POTASSIUM SERPL-SCNC: 3.3 MMOL/L (ref 3.5–5.1)
PROT SERPL-MCNC: 7.2 GM/DL (ref 6.4–8.3)
RBC # BLD AUTO: 4.14 X10(6)/MCL (ref 4.2–5.4)
SODIUM SERPL-SCNC: 140 MMOL/L (ref 136–145)
WBC # BLD AUTO: 10.73 X10(3)/MCL (ref 4.5–11.5)

## 2024-07-10 PROCEDURE — 3074F SYST BP LT 130 MM HG: CPT | Mod: CPTII,S$GLB,, | Performed by: SURGERY

## 2024-07-10 PROCEDURE — 93010 ELECTROCARDIOGRAM REPORT: CPT | Mod: ,,, | Performed by: INTERNAL MEDICINE

## 2024-07-10 PROCEDURE — 80053 COMPREHEN METABOLIC PANEL: CPT

## 2024-07-10 PROCEDURE — 99205 OFFICE O/P NEW HI 60 MIN: CPT | Mod: S$GLB,,, | Performed by: SURGERY

## 2024-07-10 PROCEDURE — 99999 PR PBB SHADOW E&M-EST. PATIENT-LVL III: CPT | Mod: PBBFAC,,, | Performed by: SURGERY

## 2024-07-10 PROCEDURE — 36415 COLL VENOUS BLD VENIPUNCTURE: CPT

## 2024-07-10 PROCEDURE — 85027 COMPLETE CBC AUTOMATED: CPT

## 2024-07-10 PROCEDURE — 93005 ELECTROCARDIOGRAM TRACING: CPT

## 2024-07-10 PROCEDURE — 1159F MED LIST DOCD IN RCRD: CPT | Mod: CPTII,S$GLB,, | Performed by: SURGERY

## 2024-07-10 PROCEDURE — 3078F DIAST BP <80 MM HG: CPT | Mod: CPTII,S$GLB,, | Performed by: SURGERY

## 2024-07-10 PROCEDURE — 3008F BODY MASS INDEX DOCD: CPT | Mod: CPTII,S$GLB,, | Performed by: SURGERY

## 2024-07-11 DIAGNOSIS — C78.7 METASTATIC COLON CANCER TO LIVER: Primary | ICD-10-CM

## 2024-07-11 DIAGNOSIS — C78.7 METASTASIS TO LIVER: ICD-10-CM

## 2024-07-11 DIAGNOSIS — C18.9 METASTATIC COLON CANCER TO LIVER: Primary | ICD-10-CM

## 2024-07-11 LAB
OHS QRS DURATION: 78 MS
OHS QTC CALCULATION: 441 MS

## 2024-07-11 RX ORDER — ENOXAPARIN SODIUM 300 MG/3ML
30 INJECTION INTRAVENOUS; SUBCUTANEOUS EVERY 24 HOURS
OUTPATIENT
Start: 2024-07-11

## 2024-07-12 ENCOUNTER — ANESTHESIA EVENT (OUTPATIENT)
Dept: SURGERY | Facility: HOSPITAL | Age: 54
End: 2024-07-12
Payer: MEDICARE

## 2024-07-17 NOTE — PRE-PROCEDURE INSTRUCTIONS
"[facility]: Outpatient Surgery  Preprocedure Instructions    Expectations: "Because of inconsistent procedure completion times, an unexpected wait may occur. The physicians would like you to be here to prepare in the event they run ahead of time. We will make you as comfortable as possible and keep you informed. We apologize in advance if this happens."     Your arrival time for your surgery or procedure is _6 am_____.  We ask patients to arrive about 2 hours before surgery to allow for enough time to review your health history & medications, start your IV, complete any outstanding labwork or tests, and meet your Anesthesiologist.    We are located at [facility], [address] [place] [address].    Enter through the [place] entrance next to the Emergency Room, and come to the 6th floor to the Outpatient Surgery Department.    If you are in need of a wheelchair the morning of surgery please call [phone] about 15 minutes before you arrive. Parking is available in our parking garage located off [facility], between the hospital and [facility] [place].      Visitory Policy:  You are allowed 2 adult visitors to be with you in the hospital. Please, no switching visitors in pre-op area. All hospital visitors should be in good current health.  No small children.  We will update you and your family hourly on the progression of surgery and any unexpected delays.      What to Bring:  Please have your ID, insurance cards, and all home medication bottles with you at check in.  Bring your CPAP machine if one is used at home.     Fasting:  Nothing to eat or drink after midnight the night before your procedure. This includes no ice, gum, hard candies, and/or tobacco products.    Medications:  Follow your doctor's instructions for taking any medications on the morning of your procedure.  If no instructions for taking medications were given, do not take any medications but bring your " medications in their bottles to your procedure check in.     Follow your doctor's preoperative instructions regarding skin prep, bowel prep, bathing, or showering prior to your procedure.  If any special soaps were provided to you, please use according to your doctor's instructions. If no instructions were given from your doctor, take a good bath or shower with antibacterial soap the night before and the morning of your procedure.  On the morning of procedure, wear loose, comfortable clothing.  No lotions, makeup, perfumes, colognes, deodorant, or jewelry to your procedure.  Removable items (glasses, contact lenses, dentures, retainers, hearing aids) need to be removed for your procedure.  Bring your storage containers for these items if you wear them.     Artificial nails, body jewelry, eyelash extensions, and/or hair extensions with metal clips are not allowed during your surgery.  If you currently wear any of these items, please arrange for them to be removed prior to your arrival to the hospital.     Outpatient or Same Day Surgeries:  Any patients receiving sedation/anesthesia are advised not to drive for 24 hours after their procedure.  We do not allow patients to drive themselves home after discharge.  If you are going home after your procedure, please have someone available to drive you home from the hospital.     You may call the Outpatient Surgery Department at [phone] with any questions or concerns.  We are looking forward to meeting you and taking great care of you for your procedure.  Thank you for choosing [facility] for your surgical needs.

## 2024-07-18 ENCOUNTER — ANESTHESIA (OUTPATIENT)
Dept: SURGERY | Facility: HOSPITAL | Age: 54
End: 2024-07-18
Payer: MEDICARE

## 2024-07-18 ENCOUNTER — HOSPITAL ENCOUNTER (OUTPATIENT)
Facility: HOSPITAL | Age: 54
Discharge: HOME | End: 2024-07-18
Attending: SURGERY | Admitting: SURGERY
Payer: MEDICARE

## 2024-07-18 VITALS
HEART RATE: 88 BPM | TEMPERATURE: 98 F | BODY MASS INDEX: 21.05 KG/M2 | RESPIRATION RATE: 18 BRPM | DIASTOLIC BLOOD PRESSURE: 72 MMHG | OXYGEN SATURATION: 95 % | WEIGHT: 150.38 LBS | SYSTOLIC BLOOD PRESSURE: 113 MMHG | HEIGHT: 71 IN

## 2024-07-18 DIAGNOSIS — C18.9 METASTATIC COLON CANCER TO LIVER: ICD-10-CM

## 2024-07-18 DIAGNOSIS — C78.7 METASTATIC COLON CANCER TO LIVER: ICD-10-CM

## 2024-07-18 DIAGNOSIS — C78.7 METASTASIS TO LIVER: ICD-10-CM

## 2024-07-18 LAB
ABORH RETYPE: NORMAL
GROUP & RH: NORMAL
INDIRECT COOMBS: NORMAL
SPECIMEN OUTDATE: NORMAL

## 2024-07-18 PROCEDURE — 49321 LAPAROSCOPY BIOPSY: CPT | Mod: 51,,, | Performed by: SURGERY

## 2024-07-18 PROCEDURE — 36415 COLL VENOUS BLD VENIPUNCTURE: CPT | Mod: 91 | Performed by: SURGERY

## 2024-07-18 PROCEDURE — 36561 INSERT TUNNELED CV CATH: CPT | Mod: RT,,, | Performed by: SURGERY

## 2024-07-18 PROCEDURE — 25000003 PHARM REV CODE 250: Performed by: NURSE ANESTHETIST, CERTIFIED REGISTERED

## 2024-07-18 PROCEDURE — 63600175 PHARM REV CODE 636 W HCPCS: Performed by: ANESTHESIOLOGY

## 2024-07-18 PROCEDURE — 47379 UNLISTED LAPS PX LIVER: CPT | Mod: 51,XS,, | Performed by: SURGERY

## 2024-07-18 PROCEDURE — 76937 US GUIDE VASCULAR ACCESS: CPT | Mod: 26,,, | Performed by: SURGERY

## 2024-07-18 PROCEDURE — 36000708 HC OR TIME LEV III 1ST 15 MIN: Performed by: SURGERY

## 2024-07-18 PROCEDURE — 27201423 OPTIME MED/SURG SUP & DEVICES STERILE SUPPLY: Performed by: SURGERY

## 2024-07-18 PROCEDURE — 86850 RBC ANTIBODY SCREEN: CPT | Performed by: SURGERY

## 2024-07-18 PROCEDURE — 86901 BLOOD TYPING SEROLOGIC RH(D): CPT | Performed by: SURGERY

## 2024-07-18 PROCEDURE — 37000009 HC ANESTHESIA EA ADD 15 MINS: Performed by: SURGERY

## 2024-07-18 PROCEDURE — 71000015 HC POSTOP RECOV 1ST HR: Performed by: SURGERY

## 2024-07-18 PROCEDURE — 36415 COLL VENOUS BLD VENIPUNCTURE: CPT | Performed by: ANESTHESIOLOGY

## 2024-07-18 PROCEDURE — 63600175 PHARM REV CODE 636 W HCPCS: Performed by: NURSE ANESTHETIST, CERTIFIED REGISTERED

## 2024-07-18 PROCEDURE — 86900 BLOOD TYPING SEROLOGIC ABO: CPT | Performed by: SURGERY

## 2024-07-18 PROCEDURE — 37000008 HC ANESTHESIA 1ST 15 MINUTES: Performed by: SURGERY

## 2024-07-18 PROCEDURE — 77001 FLUOROGUIDE FOR VEIN DEVICE: CPT | Mod: 26,XS,, | Performed by: SURGERY

## 2024-07-18 PROCEDURE — 71000016 HC POSTOP RECOV ADDL HR: Performed by: SURGERY

## 2024-07-18 PROCEDURE — D9220A PRA ANESTHESIA: Mod: CRNA,,, | Performed by: NURSE ANESTHETIST, CERTIFIED REGISTERED

## 2024-07-18 PROCEDURE — 63600175 PHARM REV CODE 636 W HCPCS: Performed by: SURGERY

## 2024-07-18 PROCEDURE — C1788 PORT, INDWELLING, IMP: HCPCS | Performed by: SURGERY

## 2024-07-18 PROCEDURE — D9220A PRA ANESTHESIA: Mod: ANES,,, | Performed by: ANESTHESIOLOGY

## 2024-07-18 PROCEDURE — 71000033 HC RECOVERY, INTIAL HOUR: Performed by: SURGERY

## 2024-07-18 PROCEDURE — 36000709 HC OR TIME LEV III EA ADD 15 MIN: Performed by: SURGERY

## 2024-07-18 DEVICE — PORT POWER CLEAR VIEW: Type: IMPLANTABLE DEVICE | Site: CHEST  WALL | Status: FUNCTIONAL

## 2024-07-18 RX ORDER — BUPIVACAINE HYDROCHLORIDE 5 MG/ML
INJECTION, SOLUTION EPIDURAL; INTRACAUDAL
Status: DISCONTINUED | OUTPATIENT
Start: 2024-07-18 | End: 2024-07-18 | Stop reason: HOSPADM

## 2024-07-18 RX ORDER — HYDROMORPHONE HYDROCHLORIDE 2 MG/ML
0.4 INJECTION, SOLUTION INTRAMUSCULAR; INTRAVENOUS; SUBCUTANEOUS EVERY 5 MIN PRN
Status: DISCONTINUED | OUTPATIENT
Start: 2024-07-18 | End: 2024-07-18 | Stop reason: HOSPADM

## 2024-07-18 RX ORDER — LIDOCAINE HYDROCHLORIDE 20 MG/ML
INJECTION, SOLUTION EPIDURAL; INFILTRATION; INTRACAUDAL; PERINEURAL
Status: DISCONTINUED | OUTPATIENT
Start: 2024-07-18 | End: 2024-07-18

## 2024-07-18 RX ORDER — SODIUM CHLORIDE, SODIUM LACTATE, POTASSIUM CHLORIDE, CALCIUM CHLORIDE 600; 310; 30; 20 MG/100ML; MG/100ML; MG/100ML; MG/100ML
INJECTION, SOLUTION INTRAVENOUS CONTINUOUS
Status: DISCONTINUED | OUTPATIENT
Start: 2024-07-18 | End: 2024-07-18 | Stop reason: HOSPADM

## 2024-07-18 RX ORDER — KETAMINE HYDROCHLORIDE 100 MG/ML
INJECTION, SOLUTION INTRAMUSCULAR; INTRAVENOUS
Status: DISCONTINUED | OUTPATIENT
Start: 2024-07-18 | End: 2024-07-18

## 2024-07-18 RX ORDER — GLUCAGON 1 MG
1 KIT INJECTION
Status: DISCONTINUED | OUTPATIENT
Start: 2024-07-18 | End: 2024-07-18 | Stop reason: HOSPADM

## 2024-07-18 RX ORDER — CEFAZOLIN SODIUM 2 G/50ML
2 SOLUTION INTRAVENOUS
Status: COMPLETED | OUTPATIENT
Start: 2024-07-18 | End: 2024-07-18

## 2024-07-18 RX ORDER — ONDANSETRON HYDROCHLORIDE 2 MG/ML
4 INJECTION, SOLUTION INTRAVENOUS ONCE AS NEEDED
Status: DISCONTINUED | OUTPATIENT
Start: 2024-07-18 | End: 2024-07-18 | Stop reason: HOSPADM

## 2024-07-18 RX ORDER — DEXAMETHASONE SODIUM PHOSPHATE 4 MG/ML
INJECTION, SOLUTION INTRA-ARTICULAR; INTRALESIONAL; INTRAMUSCULAR; INTRAVENOUS; SOFT TISSUE
Status: DISCONTINUED | OUTPATIENT
Start: 2024-07-18 | End: 2024-07-18

## 2024-07-18 RX ORDER — PROPOFOL 10 MG/ML
VIAL (ML) INTRAVENOUS
Status: DISCONTINUED | OUTPATIENT
Start: 2024-07-18 | End: 2024-07-18

## 2024-07-18 RX ORDER — ONDANSETRON HYDROCHLORIDE 2 MG/ML
INJECTION, SOLUTION INTRAVENOUS
Status: DISCONTINUED | OUTPATIENT
Start: 2024-07-18 | End: 2024-07-18

## 2024-07-18 RX ORDER — MIDAZOLAM HYDROCHLORIDE 1 MG/ML
INJECTION INTRAMUSCULAR; INTRAVENOUS
Status: DISCONTINUED | OUTPATIENT
Start: 2024-07-18 | End: 2024-07-18

## 2024-07-18 RX ORDER — ROCURONIUM BROMIDE 10 MG/ML
INJECTION, SOLUTION INTRAVENOUS
Status: DISCONTINUED | OUTPATIENT
Start: 2024-07-18 | End: 2024-07-18

## 2024-07-18 RX ORDER — FENTANYL CITRATE 50 UG/ML
INJECTION, SOLUTION INTRAMUSCULAR; INTRAVENOUS
Status: DISCONTINUED | OUTPATIENT
Start: 2024-07-18 | End: 2024-07-18

## 2024-07-18 RX ORDER — HEPARIN SODIUM 5000 [USP'U]/ML
INJECTION, SOLUTION INTRAVENOUS; SUBCUTANEOUS
Status: DISCONTINUED | OUTPATIENT
Start: 2024-07-18 | End: 2024-07-18 | Stop reason: HOSPADM

## 2024-07-18 RX ORDER — LIDOCAINE HYDROCHLORIDE 10 MG/ML
1 INJECTION, SOLUTION EPIDURAL; INFILTRATION; INTRACAUDAL; PERINEURAL ONCE
Status: DISCONTINUED | OUTPATIENT
Start: 2024-07-18 | End: 2024-07-18 | Stop reason: HOSPADM

## 2024-07-18 RX ORDER — ENOXAPARIN SODIUM 100 MG/ML
30 INJECTION SUBCUTANEOUS EVERY 24 HOURS
Status: DISCONTINUED | OUTPATIENT
Start: 2024-07-18 | End: 2024-07-18 | Stop reason: HOSPADM

## 2024-07-18 RX ADMIN — DEXAMETHASONE SODIUM PHOSPHATE 4 MG: 4 INJECTION, SOLUTION INTRA-ARTICULAR; INTRALESIONAL; INTRAMUSCULAR; INTRAVENOUS; SOFT TISSUE at 08:07

## 2024-07-18 RX ADMIN — MIDAZOLAM HYDROCHLORIDE 2 MG: 1 INJECTION, SOLUTION INTRAMUSCULAR; INTRAVENOUS at 08:07

## 2024-07-18 RX ADMIN — HYDROMORPHONE HYDROCHLORIDE 0.4 MG: 2 INJECTION, SOLUTION INTRAMUSCULAR; INTRAVENOUS; SUBCUTANEOUS at 09:07

## 2024-07-18 RX ADMIN — SODIUM CHLORIDE, SODIUM GLUCONATE, SODIUM ACETATE, POTASSIUM CHLORIDE AND MAGNESIUM CHLORIDE: 526; 502; 368; 37; 30 INJECTION, SOLUTION INTRAVENOUS at 08:07

## 2024-07-18 RX ADMIN — HYDROMORPHONE HYDROCHLORIDE 0.4 MG: 2 INJECTION, SOLUTION INTRAMUSCULAR; INTRAVENOUS; SUBCUTANEOUS at 10:07

## 2024-07-18 RX ADMIN — SUGAMMADEX 150 MG: 100 INJECTION, SOLUTION INTRAVENOUS at 09:07

## 2024-07-18 RX ADMIN — FENTANYL CITRATE 50 MCG: 50 INJECTION, SOLUTION INTRAMUSCULAR; INTRAVENOUS at 09:07

## 2024-07-18 RX ADMIN — PROPOFOL 130 MG: 10 INJECTION, EMULSION INTRAVENOUS at 08:07

## 2024-07-18 RX ADMIN — LIDOCAINE HYDROCHLORIDE 80 MG: 20 INJECTION, SOLUTION INTRAVENOUS at 08:07

## 2024-07-18 RX ADMIN — CEFAZOLIN SODIUM 2 G: 2 SOLUTION INTRAVENOUS at 08:07

## 2024-07-18 RX ADMIN — FENTANYL CITRATE 50 MCG: 50 INJECTION, SOLUTION INTRAMUSCULAR; INTRAVENOUS at 08:07

## 2024-07-18 RX ADMIN — ENOXAPARIN SODIUM 30 MG: 30 INJECTION SUBCUTANEOUS at 07:07

## 2024-07-18 RX ADMIN — ONDANSETRON 4 MG: 2 INJECTION INTRAMUSCULAR; INTRAVENOUS at 09:07

## 2024-07-18 RX ADMIN — ROCURONIUM BROMIDE 50 MG: 10 SOLUTION INTRAVENOUS at 08:07

## 2024-07-18 RX ADMIN — KETAMINE HYDROCHLORIDE 25 MG: 100 INJECTION, SOLUTION, CONCENTRATE INTRAMUSCULAR; INTRAVENOUS at 08:07

## 2024-07-18 NOTE — OP NOTE
Date of Surgery:  07/18/2024    Surgeon:  Rubio Cast MD                                      Pre-op Diagnosis:  Metastatic colon cancer    Post-op Diagnosis:  Same    Procedure:    1. Right internal jugular Mediport insertion with ultrasound and fluoroscopic guidance  2. Diagnostic laparoscopy with peritoneal biopsy   3. Wedge liver biopsy      Anesthesia:  Local/MAC    EBL:  Less than 15 cc    Specimens:  None    Findings:  Diagnostic laparoscopy revealed numerous surface liver lesions consistent with metastatic disease.  Representative wedge biopsy of left lobe lesion was performed.  There were mildly suspicious peritoneal nodules, representative biopsies were obtained, 8 [place] PowerPort placed without difficulty, final fluoroscopy revealed the tip of the catheter within the superior vena cava, no evidence of pneumothorax or other complication.  The port flushed and aspirated freely.    Procedure in detail: After informed consent was obtained the patient was brought to the operating room and placed in the supine position.  Sedation was administered by anesthesia.  The abdomen upper chest and neck were prepped and draped in sterile fashion.  After infiltration with local anesthesia, under ultrasound guidance the right internal jugular vein was cannulated with a large-bore needle and a guidewire was placed under fluoroscopic guidance into the superior vena cava.  Incision was made and a pocket was created for the port over the pectoralis fascia.  A PowerPort was assembled and flushed it was soaked in antimicrobial solution, placed in the pocket and the catheter was tunneled to the wire insertion site without difficulty.  The catheter was cut to size using fluoroscopic guidance.  An introducer dilator was placed over the guidewire and the catheter was threaded through the peel-away introducer without difficulty.  Final fluoroscopy revealed the tip of the catheter within the superior vena cava, the port flushed  and aspirated freely, final heparin solution was instilled.  There was no evidence of pneumothorax or other complication on final fluoroscopy.  The port pocket was irrigated with antimicrobial solution, meticulous hemostasis was achieved, it was closed in multiple layers with absorbable suture and sterile dressings were applied.  A right upper quadrant incision was made optical trocar used into peritoneal cavity under direct vision.  Pneumoperitoneum achieved without difficulty.  Additional ports were placed under direct vision.  The abdomen was explored laparoscopically visualizing all visceral and parietal peritoneal surfaces and carefully examining the liver.  There were a few suspicious peritoneal nodules, representative biopsies were obtained using biopsy forceps.  A surface left lobe liver lesion was wedge biopsied using sharp dissection, meticulous hemostasis was achieved at the hepatic transection zone using electrocautery, excellent hemostasis noted.  Ports were removed pneumoperitoneum was relieved port sites were closed absorbable suture sterile dressings were applied  The patient tolerated the procedure well.    Brief Discharge Note:    Outcome: Satisfactory  Disposition: Discharged home postoperatively in good condition  Followup:  2 weeks  Final Diagnosis same as postoperative diagnosis    Rubio Cast MD  Surgical Oncology  Complex General, Gastrointestinal and Hepatobiliary Surgery

## 2024-07-18 NOTE — ANESTHESIA PREPROCEDURE EVALUATION
07/17/2024  Shira Nair is a 54 y.o., female, who presents for the following:    Procedures:      LAPAROSCOPY, DIAGNOSTIC - WITH LAP LIVER BIOPSY - ULTRASOUND GUIDED      [id number] (Chest)   Anesthesia type: General   Diagnosis: Metastatic colon cancer to liver [C18.9, C78.7]   Pre-op diagnosis: Metastatic colon cancer to liver [C18.9, C78.7]   Location: [facility] OR  / [facility] OR   Surgeons: Rubio Cast MD     LAB:             Pre-op Assessment    I have reviewed the Patient Summary Reports.     I have reviewed the Nursing Notes. I have reviewed the NPO Status.   I have reviewed the Medications.     Review of Systems  Anesthesia Hx:  No problems with previous Anesthesia             Denies Family Hx of Anesthesia complications.    Denies Personal Hx of Anesthesia complications.                    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 Mild Cerebral atrophy                              Endocrine:  Endocrine Normal            Psych:    depression                Physical Exam  General: Alert and Oriented    Airway:  Mallampati: I   Mouth Opening: Normal  TM Distance: Normal  Tongue: Normal  Neck ROM: Normal ROM    Dental:  Edentulous, except for lower left incisors  Chest/Lungs:  Normal Respiratory Rate    Heart:  Rate: Normal  Rhythm: Regular Rhythm        Anesthesia Plan  Type of Anesthesia, risks & benefits discussed:    Anesthesia Type: Gen ETT  Intra-op Monitoring Plan: Standard ASA Monitors  Post Op Pain Control Plan: IV/PO Opioids PRN and multimodal analgesia  Induction:  IV  Airway Plan: Direct,  Post-Induction  Informed Consent: Informed consent signed with the Patient and all parties understand the risks and agree with anesthesia plan.  All questions answered. Patient consented to blood products? Yes  ASA Score: 3  Day of Surgery Review of History & Physical: H&P Update referred to the surgeon/provider.    Ready For Surgery From Anesthesia Perspective.     .

## 2024-07-18 NOTE — TRANSFER OF CARE
"Anesthesia Transfer of Care Note    Patient: Shira Nair    Procedure(s) Performed: Procedure(s) (LRB):  LAPAROSCOPY, DIAGNOSTIC (N/A)  [id number] (N/A)    Patient location: PACU    Anesthesia Type: general    Transport from OR: Transported from OR on room air with adequate spontaneous ventilation    Post pain: adequate analgesia    Post assessment: no apparent anesthetic complications    Post vital signs: stable    Level of consciousness: sedated and responds to stimulation    Nausea/Vomiting: no nausea/vomiting    Complications: none    Transfer of care protocol was followed      Last vitals: Visit Vitals  BP (!) 140/72   Pulse 104   Temp 36 °C (96.8 °F)   Resp 14   Ht 5' 10.51" (1.791 m)   Wt 68.2 kg (150 lb 5.7 oz)   SpO2 100%   Breastfeeding No   BMI 21.26 kg/m²     "

## 2024-07-18 NOTE — ANESTHESIA PROCEDURE NOTES
Intubation    Date/Time: 7/18/2024 8:29 AM    Performed by: Aleks Musa CRNA  Authorized by: Pete Jiang MD    Intubation:     Induction:  Intravenous    Intubated:  Postinduction    Mask Ventilation:  Easy mask    Attempts:  1    Attempted By:  CRNA    Method of Intubation:  Direct    Blade:  Holt 2    Laryngeal View Grade: Grade I - full view of cords      Difficult Airway Encountered?: No      Complications:  None    Airway Device:  Oral endotracheal tube    Airway Device Size:  7.0    Style/Cuff Inflation:  Cuffed (inflated to minimal occlusive pressure)    Inflation Amount (mL):  5    Tube secured:  22    Secured at:  The lips    Placement Verified By:  Capnometry    Complicating Factors:  None    Findings Post-Intubation:  BS equal bilateral and atraumatic/condition of teeth unchanged

## 2024-07-18 NOTE — DISCHARGE INSTRUCTIONS
Do not drive or drink alcohol for 24 hours or while taking pain medications.    A chest Xray will be taken prior to discharge to confirm mediport placement.     Follow up with your oncologist or the physician overseeing your infusions as scheduled.     Report these Signs and Symptoms to your Surgeon or your oncologist if they occur.     Signs of infection. These include a fever of 100.4°F (38°C) or higher, chills.  Signs of wound infection. These include swelling, redness, warmth around the wound; too much pain when touched; yellowish, greenish, or bloody discharge; foul smell coming from the cut site; cut site opens up.     Report to ER with any excessive uncontrollable bleeding, heavy increase bruising at site (growing in size), or SEVERE/SUDDEN chest pain and/or shortness of breath.     Resume regular diet. Okay to shower, no tub baths or submerging in water for at least 7 days (about the time incision is fully healed).  Do not scrub the incision, pat dry after bathing. Dermabond glue will start to fall off naturally over the next 7 days. Do not peel it off as this may reopen the incision and delay heeling.         NO DRIVING AND NO ALCOHOL consumption FOR 24 HOURS or while taking narcotic pain medications.    Keep sites CLEAN AND DRY for 48 hours. OK to shower afterwards. Do NOT submerge incision under water. Do not apply lotions, creams, or ointments.     NO heavy lifting. DO NOT lift objects greater than 10 lbs. Your doctor will advise at your follow up appointment when to resume normal activity.     You may have received Exparel (indicated by the BLUE armband) which is an injection used to ease pain at the surgery site. This drug may cause short-term loss of feeling and motor activity in the body. This may last for up to 5 days. Please see attached for additional information.     Monitor for infection: chills, fever (100.4 F), redness or drainage at surgical site. Notify your doctor if any of these occur.      Report to your nearest ER if you experience any SUDDEN/SEVERE abdominal pain, trouble breathing, uncontrolled pain, profound weakness.      BLEEDING: if you experience uncontrollable bleeding, contact your doctor and go to ER.    NAUSEA: due to the anesthesia, you may experience nausea for up to 24 hours. If nausea and vomiting last longer, contact your doctor.     INFECTION:  watch for any signs or symptoms of infection such as chills, fever, redness or drainage at surgical site. Notify your doctor.     PAIN : take your pain medications as directed. If the pain medications are not helping, notify your doctor.

## 2024-07-22 LAB — PSYCHE PATHOLOGY RESULT: NORMAL

## 2024-07-22 NOTE — ANESTHESIA POSTPROCEDURE EVALUATION
Anesthesia Post Evaluation    Patient: Shira Nair    Procedure(s) Performed: Procedure(s) (LRB):  LAPAROSCOPY, DIAGNOSTIC (N/A)  [id number] (N/A)    Final Anesthesia Type: general      Patient location during evaluation: PACU  Patient participation: Yes- Able to Participate  Level of consciousness: oriented and sedated  Post-procedure vital signs: reviewed and stable  Pain management: adequate  Airway patency: patent    PONV status at discharge: No PONV  Anesthetic complications: no      Cardiovascular status: stable and hemodynamically stable  Respiratory status: spontaneous ventilation and unassisted  Hydration status: euvolemic  Follow-up not needed.  Comments: [facility]              Vitals Value Taken Time   /72 07/18/24 1120   Temp 36.4 °C (97.5 °F) 07/18/24 1015   Pulse 88 07/18/24 1120   Resp 17 07/18/24 1009   SpO2 95 % 07/18/24 1120   Vitals shown include unfiled device data.      Event Time   Out of Recovery 10:10:00         Pain/Wallace Score: No data recorded

## 2024-07-23 ENCOUNTER — HOSPITAL ENCOUNTER (EMERGENCY)
Facility: HOSPITAL | Age: 54
Discharge: HOME | End: 2024-07-23
Attending: EMERGENCY MEDICINE
Payer: MEDICARE

## 2024-07-23 VITALS
WEIGHT: 150.38 LBS | HEART RATE: 78 BPM | SYSTOLIC BLOOD PRESSURE: 127 MMHG | BODY MASS INDEX: 21.05 KG/M2 | TEMPERATURE: 98 F | OXYGEN SATURATION: 97 % | DIASTOLIC BLOOD PRESSURE: 95 MMHG | HEIGHT: 71 IN | RESPIRATION RATE: 16 BRPM

## 2024-07-23 DIAGNOSIS — C78.7 METASTATIC COLON CANCER TO LIVER: Primary | ICD-10-CM

## 2024-07-23 DIAGNOSIS — C18.9 METASTATIC COLON CANCER TO LIVER: Primary | ICD-10-CM

## 2024-07-23 DIAGNOSIS — R10.31 RLQ ABDOMINAL PAIN: ICD-10-CM

## 2024-07-23 DIAGNOSIS — E87.6 HYPOKALEMIA: ICD-10-CM

## 2024-07-23 DIAGNOSIS — R11.10 VOMITING: ICD-10-CM

## 2024-07-23 LAB
ALBUMIN SERPL-MCNC: 3.9 G/DL (ref 3.4–5)
ALBUMIN/GLOB SERPL: 1 RATIO
ALP SERPL-CCNC: 1139 UNIT/L (ref 50–144)
ALT SERPL-CCNC: 34 UNIT/L (ref 1–45)
ANION GAP SERPL CALC-SCNC: 12 MEQ/L (ref 2–13)
AST SERPL-CCNC: 42 UNIT/L (ref 14–36)
BACTERIA #/AREA URNS AUTO: ABNORMAL /HPF
BASOPHILS # BLD AUTO: 0.02 X10(3)/MCL (ref 0.01–0.08)
BASOPHILS NFR BLD AUTO: 0.1 % (ref 0.1–1.2)
BILIRUB SERPL-MCNC: 0.9 MG/DL (ref 0–1)
BILIRUB UR QL STRIP.AUTO: NEGATIVE
BUN SERPL-MCNC: 15 MG/DL (ref 7–20)
CALCIUM SERPL-MCNC: 9.9 MG/DL (ref 8.4–10.2)
CHLORIDE SERPL-SCNC: 101 MMOL/L (ref 98–110)
CLARITY UR: CLEAR
CO2 SERPL-SCNC: 26 MMOL/L (ref 21–32)
COLOR UR AUTO: YELLOW
CREAT SERPL-MCNC: 0.89 MG/DL (ref 0.66–1.25)
CREAT/UREA NIT SERPL: 17 (ref 12–20)
EOSINOPHIL # BLD AUTO: 0.21 X10(3)/MCL (ref 0.04–0.36)
EOSINOPHIL NFR BLD AUTO: 1.6 % (ref 0.7–7)
ERYTHROCYTE [DISTWIDTH] IN BLOOD BY AUTOMATED COUNT: 18.5 % (ref 11–14.5)
GFR SERPLBLD CREATININE-BSD FMLA CKD-EPI: 77 ML/MIN/1.73/M2
GLOBULIN SER-MCNC: 3.9 GM/DL (ref 2–3.9)
GLUCOSE SERPL-MCNC: 169 MG/DL (ref 70–115)
GLUCOSE UR QL STRIP: 100
HCT VFR BLD AUTO: 32.2 % (ref 36–48)
HGB BLD-MCNC: 10.4 G/DL (ref 11.8–16)
HGB UR QL STRIP: NEGATIVE
IMM GRANULOCYTES # BLD AUTO: 0.05 X10(3)/MCL (ref 0–0.03)
IMM GRANULOCYTES NFR BLD AUTO: 0.4 % (ref 0–0.5)
KETONES UR QL STRIP: NEGATIVE
LEUKOCYTE ESTERASE UR QL STRIP: NEGATIVE
LIPASE SERPL-CCNC: 23 U/L (ref 23–300)
LYMPHOCYTES # BLD AUTO: 1.26 X10(3)/MCL (ref 1.16–3.74)
LYMPHOCYTES NFR BLD AUTO: 9.3 % (ref 20–55)
MAGNESIUM SERPL-MCNC: 2 MG/DL (ref 1.8–2.4)
MCH RBC QN AUTO: 25.5 PG (ref 27–34)
MCHC RBC AUTO-ENTMCNC: 32.3 G/DL (ref 31–37)
MCV RBC AUTO: 78.9 FL (ref 79–99)
MONOCYTES # BLD AUTO: 1.32 X10(3)/MCL (ref 0.24–0.36)
MONOCYTES NFR BLD AUTO: 9.8 % (ref 4.7–12.5)
MUCOUS THREADS URNS QL MICRO: ABNORMAL /LPF
NEUTROPHILS # BLD AUTO: 10.66 X10(3)/MCL (ref 1.56–6.13)
NEUTROPHILS NFR BLD AUTO: 78.8 % (ref 37–73)
NITRITE UR QL STRIP: NEGATIVE
NRBC BLD AUTO-RTO: 0 %
PH UR STRIP: 7 [PH]
PLATELET # BLD AUTO: 339 X10(3)/MCL (ref 140–371)
PMV BLD AUTO: 9.4 FL (ref 9.4–12.4)
POTASSIUM SERPL-SCNC: 3 MMOL/L (ref 3.5–5.1)
PROT SERPL-MCNC: 7.8 GM/DL (ref 6.3–8.2)
PROT UR QL STRIP: 100
RBC # BLD AUTO: 4.08 X10(6)/MCL (ref 4–5.1)
RBC #/AREA URNS AUTO: ABNORMAL /HPF
SODIUM SERPL-SCNC: 139 MMOL/L (ref 136–145)
SP GR UR STRIP.AUTO: 1.02 (ref 1–1.03)
SQUAMOUS #/AREA URNS AUTO: ABNORMAL /HPF
TROPONIN I SERPL-MCNC: <0.012 NG/ML (ref 0–0.03)
UROBILINOGEN UR STRIP-ACNC: >=8
WBC # BLD AUTO: 13.52 X10(3)/MCL (ref 4–11.5)
WBC #/AREA URNS AUTO: ABNORMAL /HPF

## 2024-07-23 PROCEDURE — 80053 COMPREHEN METABOLIC PANEL: CPT | Performed by: EMERGENCY MEDICINE

## 2024-07-23 PROCEDURE — 81015 MICROSCOPIC EXAM OF URINE: CPT | Performed by: EMERGENCY MEDICINE

## 2024-07-23 PROCEDURE — 83690 ASSAY OF LIPASE: CPT | Performed by: EMERGENCY MEDICINE

## 2024-07-23 PROCEDURE — 81003 URINALYSIS AUTO W/O SCOPE: CPT | Performed by: EMERGENCY MEDICINE

## 2024-07-23 PROCEDURE — 93005 ELECTROCARDIOGRAM TRACING: CPT

## 2024-07-23 PROCEDURE — 84484 ASSAY OF TROPONIN QUANT: CPT | Performed by: EMERGENCY MEDICINE

## 2024-07-23 PROCEDURE — 25000003 PHARM REV CODE 250: Performed by: EMERGENCY MEDICINE

## 2024-07-23 PROCEDURE — 85025 COMPLETE CBC W/AUTO DIFF WBC: CPT | Performed by: EMERGENCY MEDICINE

## 2024-07-23 PROCEDURE — 93010 ELECTROCARDIOGRAM REPORT: CPT | Mod: ,,, | Performed by: INTERNAL MEDICINE

## 2024-07-23 PROCEDURE — 63600175 PHARM REV CODE 636 W HCPCS: Performed by: EMERGENCY MEDICINE

## 2024-07-23 PROCEDURE — 25500020 PHARM REV CODE 255: Performed by: EMERGENCY MEDICINE

## 2024-07-23 PROCEDURE — 83735 ASSAY OF MAGNESIUM: CPT | Performed by: EMERGENCY MEDICINE

## 2024-07-23 RX ORDER — HYDROMORPHONE HYDROCHLORIDE 1 MG/ML
1 INJECTION, SOLUTION INTRAMUSCULAR; INTRAVENOUS; SUBCUTANEOUS
Status: COMPLETED | OUTPATIENT
Start: 2024-07-23 | End: 2024-07-23

## 2024-07-23 RX ORDER — POTASSIUM CHLORIDE 20 MEQ/1
40 TABLET, EXTENDED RELEASE ORAL
Status: COMPLETED | OUTPATIENT
Start: 2024-07-23 | End: 2024-07-23

## 2024-07-23 RX ORDER — MORPHINE SULFATE 4 MG/ML
8 INJECTION, SOLUTION INTRAMUSCULAR; INTRAVENOUS ONCE
Status: COMPLETED | OUTPATIENT
Start: 2024-07-23 | End: 2024-07-23

## 2024-07-23 RX ORDER — ONDANSETRON HYDROCHLORIDE 2 MG/ML
4 INJECTION, SOLUTION INTRAVENOUS
Status: COMPLETED | OUTPATIENT
Start: 2024-07-23 | End: 2024-07-23

## 2024-07-23 RX ADMIN — SODIUM CHLORIDE, POTASSIUM CHLORIDE, SODIUM LACTATE AND CALCIUM CHLORIDE 1000 ML: 600; 310; 30; 20 INJECTION, SOLUTION INTRAVENOUS at 08:07

## 2024-07-23 RX ADMIN — IOHEXOL 90 ML: 300 INJECTION, SOLUTION INTRAVENOUS at 09:07

## 2024-07-23 RX ADMIN — ONDANSETRON 4 MG: 2 INJECTION INTRAMUSCULAR; INTRAVENOUS at 08:07

## 2024-07-23 RX ADMIN — HYDROMORPHONE HYDROCHLORIDE 1 MG: 1 INJECTION, SOLUTION INTRAMUSCULAR; INTRAVENOUS; SUBCUTANEOUS at 10:07

## 2024-07-23 RX ADMIN — MORPHINE SULFATE 8 MG: 4 INJECTION, SOLUTION INTRAMUSCULAR; INTRAVENOUS at 08:07

## 2024-07-23 RX ADMIN — POTASSIUM CHLORIDE 40 MEQ: 1500 TABLET, EXTENDED RELEASE ORAL at 09:07

## 2024-07-24 LAB
OHS QRS DURATION: 74 MS
OHS QTC CALCULATION: 670 MS

## 2024-07-24 NOTE — ED PROVIDER NOTES
Encounter Date: 7/23/2024       History     Chief Complaint   Patient presents with    Abdominal Pain     Reports she is having abd pain and has been all day. Took pain meds and tylenol and it didn't help. Pt has colon cancer.      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  Patient says she was diagnosed with colon cancer 2 months ago just had a port placed has not started chemotherapy yet.  She is complaining of mostly lower abdominal pain right lower quadrant, left lower quadrant, it is not radiating.  She denies any urinary symptoms such as frequency urgency or burning.  She is not have any fever chills.  She is not have any vomiting or any diarrhea.  She deals with constipation chronically says that she has a bowel movement every couple of days.  She had a bowel movement yesterday and said it was normal.  She has been taking Norco goes every 6 hours but it does not help with the pain.  She does not have any chest pain or any shortness of breath.  Patient has had a prior cholecystectomy and hysterectomy.      Review of patient's allergies indicates:   Allergen Reactions    Ketorolac Hives    Tramadol Hives    Vancomycin analogues Hives     Past Medical History:   Diagnosis Date    Brain tumor     Cervical ca     Depression     Malignant neoplasm of colon, unspecified     Opioid abuse      Past Surgical History:   Procedure Laterality Date    BRAIN SURGERY      CHOLECYSTECTOMY      COLONOSCOPY N/A 06/06/2024    Procedure: COLON;  Surgeon: Kevin Causey MD;  Location: [facility] ENDOSCOPY;  Service: Gastroenterology;  Laterality: N/A;    COLONOSCOPY, WITH 1 OR MORE BIOPSIES  06/06/2024    Procedure: COLONOSCOPY, WITH 1 OR MORE BIOPSIES;  Surgeon: Kevin Causey MD;  Location: [facility] ENDOSCOPY;  Service: Gastroenterology;;    COLONOSCOPY, WITH DIRECTED SUBMUCOSAL  INJECTION  06/06/2024    Procedure: COLONOSCOPY, WITH DIRECTED SUBMUCOSAL INJECTION;  Surgeon: Kevin Causey MD;  Location: [facility] ENDOSCOPY;  Service: Gastroenterology;;    DIAGNOSTIC LAPAROSCOPY N/A 7/18/2024    Procedure: LAPAROSCOPY, DIAGNOSTIC;  Surgeon: Rubio Cast MD;  Location: [facility];  Service: General;  Laterality: N/A;  WITH LAP LIVER BIOPSY - ULTRASOUND GUIDED    EGD, WITH CLOSED BIOPSY  06/06/2024    Procedure: EGD, WITH CLOSED BIOPSY;  Surgeon: Kevin Causey MD;  Location: [facility] ENDOSCOPY;  Service: Gastroenterology;;    ESOPHAGOGASTRODUODENOSCOPY N/A 06/06/2024    Procedure: DOUBLE;  Surgeon: Kevin Causey MD;  Location: [facility] ENDOSCOPY;  Service: Gastroenterology;  Laterality: N/A;    HYSTERECTOMY      INSERTION OF TUNNELED CENTRAL VENOUS CATHETER (CVC) WITH SUBCUTANEOUS PORT N/A 7/18/2024    Procedure: [id number];  Surgeon: Rubio Cast MD;  Location: [facility];  Service: General;  Laterality: N/A;    KNEE SURGERY      THYROID LOBECTOMY       Family History   Problem Relation Name Age of Onset    Hypertension Mother      Hypertension Father      Stroke Father      Pancreatic cancer Brother      Cancer Paternal Grandfather       Social History     Tobacco Use    Smoking status: Never     Passive exposure: Never    Smokeless tobacco: Never   Substance Use Topics    Alcohol use: Never    Drug use: Not Currently     Types: Cocaine     Comment: 5-6 YEARS QUIT ON SUBOXONE     Review of Systems   Constitutional:  Negative for chills and fever.   HENT:  Negative for sore throat.    Respiratory:  Negative for shortness of breath.    Cardiovascular:  Negative for chest pain and leg swelling.   Gastrointestinal:  Positive for abdominal pain and constipation. Negative for diarrhea, nausea and vomiting.   Genitourinary:  Negative for dysuria.   Musculoskeletal:  Negative for back pain.   Skin:  Negative for rash.   Neurological:  Negative for weakness  and headaches.   Hematological:  Does not bruise/bleed easily.   All other systems reviewed and are negative.      Physical Exam     Initial Vitals [07/23/24 1915]   BP Pulse Resp Temp SpO2   123/65 (!) 119 18 98.9 °F (37.2 °C) 98 %      MAP       --         Physical Exam    Nursing note and vitals reviewed.  Constitutional: She appears well-developed and well-nourished.   HENT:   Head: Normocephalic and atraumatic.   Mouth/Throat: No oropharyngeal exudate.   Eyes: Conjunctivae and EOM are normal. Pupils are equal, round, and reactive to light.   Neck: Neck supple. No tracheal deviation present.   Normal range of motion.  Cardiovascular:  Regular rhythm, normal heart sounds and intact distal pulses.           No murmur heard.  Port present in the right chest.  Tachycardic   Pulmonary/Chest: Breath sounds normal. No stridor. No respiratory distress. She has no wheezes. She has no rhonchi. She has no rales.   Abdominal: Abdomen is soft. She exhibits no distension. There is abdominal tenderness (right lower quadrant and left lower quadrant to palpation). There is no rebound and no guarding.   Musculoskeletal:         General: No tenderness or edema. Normal range of motion.      Cervical back: Normal range of motion and neck supple.     Lymphadenopathy:     She has no cervical adenopathy.   Neurological: She is alert and oriented to person, place, and time. She has normal strength. No cranial nerve deficit or sensory deficit.   Skin: Skin is warm and dry. Capillary refill takes less than 2 seconds. No rash noted. No erythema. No pallor.   Psychiatric: She has a normal mood and affect. Her behavior is normal. Judgment and thought content normal.         ED Course   Procedures  Labs Reviewed   COMPREHENSIVE METABOLIC PANEL - Abnormal       Result Value    Sodium 139      Potassium 3.0 (*)     Chloride 101      CO2 26      Glucose 169 (*)     Blood Urea Nitrogen 15      Creatinine 0.89      Calcium 9.9      Protein Total  7.8      Albumin 3.9      Globulin 3.9      Albumin/Globulin Ratio 1.0      Bilirubin Total 0.9      ALP 1,139 (*)     ALT 34      AST 42 (*)     eGFR 77      Anion Gap 12.0      BUN/Creatinine Ratio 17     CBC WITH DIFFERENTIAL - Abnormal    WBC 13.52 (*)     RBC 4.08      Hgb 10.4 (*)     Hct 32.2 (*)     MCV 78.9 (*)     MCH 25.5 (*)     MCHC 32.3      RDW 18.5 (*)     Platelet 339      MPV 9.4      Neut % 78.8 (*)     Lymph % 9.3 (*)     Mono % 9.8      Eos % 1.6      Basophil % 0.1      Lymph # 1.26      Neut # 10.66 (*)     Mono # 1.32 (*)     Eos # 0.21      Baso # 0.02      IG# 0.05 (*)     IG% 0.4      NRBC% 0.0     URINALYSIS - Abnormal    Color, UA Yellow      Appearance, UA Clear      Specific Gravity, UA 1.020      pH, UA 7.0      Protein,  (*)     Glucose,  (*)     Ketones, UA Negative      Blood, UA Negative      Bilirubin, UA Negative      Urobilinogen, UA >=8.0 (*)     Nitrites, UA Negative      Leukocyte Esterase, UA Negative      Narrative:      URINE STABILITY IS 2 HOURS AT ROOM TEMP OR    SIX HOURS REFRIGERATED. PERFORMING TESTING ON    SPECIMENS GREATER THAN THIS AGE MAY AFFECT THE    FOLLOWING TESTS:    PH          SPECIFIC GRAVITY           BLOOD    CLARITY     BILIRUBIN               UROBILINOGEN   URINALYSIS, MICROSCOPIC - Abnormal    Bacteria, UA Occasional      Mucous, UA Small (*)     RBC, UA 0-2      WBC, UA 0-2      Squamous Epithelial Cells, UA Occasional     TROPONIN I - Normal    Troponin-I <0.012     LIPASE - Normal    Lipase Level 23     MAGNESIUM - Normal    Magnesium Level 2.00     CBC W/ AUTO DIFFERENTIAL    Narrative:     The following orders were created for panel order CBC auto differential.  Procedure                               Abnormality         Status                     ---------                               -----------         ------                     CBC with Differential[[id number]]       Abnormal            Final result                 Please view  results for these tests on the individual orders.          Imaging Results              CT Abdomen Pelvis With IV Contrast NO Oral Contrast (Preliminary result)  Result time 07/23/24 21:45:32      Preliminary result by Abraham Preciado Jr., MD (07/23/24 21:45:32)                   Narrative:    START OF REPORT:  Technique: CT of the abdomen and pelvis was performed with axial images as well as sagittal and coronal reconstruction images with intravenous contrast.    Comparison: Comparison is with study dated 2024-06-25 20:01:52.    Clinical History: ABD PN 90CC OMNIPAQUE 300 IV RECENT DX WITH COLON CA.    Dosage Information: Automated Exposure Control was utilized.    Findings:  Lines and Tubes: Drainage catheters are seen stable in the bilateral hemiabdomen.  Thorax:  Lungs: Mild opacity is present at the visualized lung bases, consistent with scarring and atelectasis. A few subcentimeter non- calcified nodules are seen in the anterior and lateral middle lobe as well as posterior lower lobe, some of which are seen stable in the prior study.  Pleura: No effusions or thickening. No pneumothorax is seen.  Heart: The heart size is within normal limits.  Abdomen:  Abdominal Wall: No abdominal wall pathology is seen.  Liver: Multiple hypoenhancing lesions are again seen in both hepatic lobes with subtle decreased in size in the aggregate of lesions in the anterior right hepatic lobe measuring 5.9 x 5 cm (AP x T) on series 3 image 29.  Biliary System: No intrahepatic or extrahepatic biliary duct dilatation is seen.  Gallbladder: Surgical clips are seen in the gallbladder fossa consistent with prior cholecystectomy.  Pancreas: There is pronounced fatty replacement of the pancreas.  Spleen: The spleen appears unremarkable.  Adrenals: The adrenal glands appear unremarkable.  Kidneys: The right kidney appears unremarkable with no stones cysts masses or hydronephrosis. A single stone measuring 7.9 mm is seen on Image 35,  Series 6 in the lower pole of the left kidney. The left kidney otherwise appears unremarkable with no cysts masses or hydronephrosis identified.  Aorta: There is mild calcification of the abdominal aorta and its branches.  IVC: Unremarkable.  Bowel:  Esophagus: The visualized esophagus appears unremarkable.  Stomach: The stomach appears unremarkable.  Duodenum: Unremarkable appearing duodenum.  Small Bowel: The small bowel appears unremarkable.  Colon: There is moderate stool in the colon which could reflect an element of constipation. There is stable 6 cm long annular wall thickening along the descending colon centered on series 6 image 32. This is consistent with the known primary colon malignancy.  Appendix: The appendix appears unremarkable seen on Image 66, Series 3 through Image 73, Series 3.  Peritoneum: No intraperitoneal free air or ascites is seen.    Pelvis:  Bladder: The bladder is nondistended but appears otherwise unremarkable.  Female:  Uterus: The uterus is not identified.  Ovaries: No adnexal masses are seen.    Bony structures:  Dorsal Spine: There is mild spondylosis of the visualized dorsal spine.  Bony Pelvis: There is mild degenerative change of the hip. The visualized bony structures of the pelvis otherwise appear unremarkable.    Miscellaneous: There is stable midl compression deformity of T9.      Impression:  1. A few subcentimeter non- calcified nodules are seen in the anterior and lateral middle lobe as well as posterior lower lobe, some of which are seen stable in the prior study. This are of concern for metastasis.  2. Multiple hypoenhancing lesions are again seen in both hepatic lobes with subtle decreased in size in the aggregate of lesions in the anterior right hepatic lobe measuring 5.9 x 5 cm (AP x T) on series 3 image 29. These are ascribed to metastatic lesions.  3. There is stable 6 cm long annular wall thickening along the descending colon centered on series 6 image 32. This is  consistent with the known primary colon malignancy.  4. Details and other findings as discussed above.                                         Medications   lactated ringers bolus 1,000 mL (0 mLs Intravenous Stopped 7/23/24 2141)   morphine injection 8 mg (8 mg Intravenous Given 7/23/24 2041)   ondansetron injection 4 mg (4 mg Intravenous Given 7/23/24 2041)   potassium chloride SA CR tablet 40 mEq (40 mEq Oral Given 7/23/24 2127)   iohexoL (OMNIPAQUE 300) injection 100 mL (90 mLs Intravenous Given 7/23/24 2112)   HYDROmorphone injection 1 mg (1 mg Intravenous Given 7/23/24 2215)     Medical Decision Making  Differential includes but not limited to peptic ulcer disease, pancreatitis, splenic infarct, kidney stone, pyelonephritis, urinary tract infection, bowel obstruction, volvulus, intussusception, mesenteric adenitis, mesenteric ischemia, intra-abdominal abscess, intra-abdominal hemorrhage, ACS, pneumonia, constipation, perforation, appendicitis, cholecystitis, cholelithiasis, cholangitis, cancer    Emergent evaluation of a 54-year-old female presents emergency department with abdominal pain as mentioned above.  She is well-appearing, and in mild distress initially.  She had improvement after IV pain medication administered.  Mild leukocytosis, chronic alkaline phosphatase elevation, electrolytes otherwise within normal limits other than mild hypokalemia.  This has been repleted here in the emergency department.  She will have this rechecked in 1 week.  She in addition had a CT scan of the abdomen and pelvis given her abdominal exam and concern for possible obstruction and no evidence of any obstruction is found.  Normal appendix, patient has known metastatic colon cancer to liver and CT scan looks essentially unchanged when compared to prior CT scan.  Patient is feeling better.  She does not want to be admitted for pain control.  Do not see any other life-threatening abnormalities on laboratory/imaging evaluation  on today's emergency department visit.  Will discharge home with follow up with her oncologist and primary care provider.    A dictation software program was used for this note.  Please expect some simple typographical  errors in this note.    I had a detailed discussion with the patient and/or guardian regarding: The historical points, exam findings, and diagnostic results supporting the discharge diagnosis, lab results, pertinent radiology results, and the need for outpatient follow-up, for definitive care with a family practitioner and to return to the emergency department if symptoms worsen or persist or if there are any questions or concerns that arise at home. All questions have been answered in detail. Strict return to Emergency Department precautions have been provided       Amount and/or Complexity of Data Reviewed  Independent Historian: friend  External Data Reviewed: labs and notes.  Labs: ordered. Decision-making details documented in ED Course.  Radiology: ordered and independent interpretation performed. Decision-making details documented in ED Course.  ECG/medicine tests: ordered and independent interpretation performed. Decision-making details documented in ED Course.    Risk  OTC drugs.  Prescription drug management.  Decision regarding hospitalization.               ED Course as of 07/23/24 2227 Tue Jul 23, 2024 1956 EKG 7:51 p.m. sinus tachycardia rate of 108, short MD interval.  No STEMI.  EKG interpreted independently by me. [JR]   2214 Reassessed the patient, she says she is feeling better.  She is requesting a dose of Dilaudid prior to discharge.  She says that she feels well enough to go home.  She does not think she needs to be admitted.  I did offer to admit her for pain control but she declines. [JR]      ED Course User Index  [JR] Nilay Flores DO                           Clinical Impression:  Final diagnoses:  [R11.10] Vomiting  [E87.6] Hypokalemia  [C18.9, C78.7] Metastatic  colon cancer to liver (Primary)  [R10.31] RLQ abdominal pain          ED Disposition Condition    Discharge Stable          ED Prescriptions    None       Follow-up Information       Follow up With Specialties Details Why Contact Info    Amado Baig III, MD Family Medicine In 3 days  [address] [place] [address]  [phone]      [facility]  call [phone] for [facility] for a family doctor [address] [place] [id number] [phone]             Nilay Flores DO  07/23/24 2228

## 2024-07-24 NOTE — PROGRESS NOTES
Subjective:       Patient ID: Sihra Nair is a 54 y.o. female.    Chief Complaint: New Patient    Diagnosis: Metastatic Colon Adenocarcinoma - mets to liver and lung    Current Treatment: FOLFOX q2w x 12 cycles-- to start 7/29/24    Treatment History: N/A    HPI  55yo F presented in June '24 for evaluation of liver metastases. She presented to OSH in May '24 with 1 month of abdominal pain. CT A/P at that time revealed numerous hepatic lesions, largest measuring 5.8 x 7cm, concerning for metastatic disease. CT Chest revealed numerous new bilateral pulmonary nodules measuring 3-4mm in size of indeterminate etiology. IR liver biopsy was done which was completely necrotic. An egd and colonoscopy done with Dr. Jones in June '24 revealed a severely stenotic malignant lesion in the mid descending colon that was unable to be traversed. Biopsy was taken which revealed at least high grade dysplasia suspicious for adenocarcinoma. Her CEA at time of imaging/workup was 546. She has a history of cervical cancer about 15 years ago, she had a total hysterectomy for this. She has a history of a benign brain tumor that required  shunt placed in the 1980s. She also had benign thyroid tumors removed about 8 years prior to presentation.     She underwent mediport placement and liver biopsy with ex lap with Dr. Cast on 7/18/24. Liver biopsy returned positive for metastatic colorectal adenocarcinoma. Will plan to proceed with 1st line FOLFOX with 3rd agent pending NGS studies.     Interval History:  Patient returns to clinic for follow up and treatment clearance for C1 FOLFOX q2w. She is here today with her sister.   She reports increase in abdominal pain for the last 3-4 days. She presented to ED on 7/23 for pain, CT scans performed, no abnormal findings.   She reports some normal bowel movements but at times she does require an enema.   She takes Lortabs q4-6 hrs as needed for pain.  Denies SOB, chest pain, fever,  chills.  Discussed her scan results, pathology, the incurable nature of her disease, and treatment plan moving forward. She is agreeable to proceed.     Past Medical History:   Diagnosis Date    Brain tumor     Cervical ca     Depression     Malignant neoplasm of colon, unspecified     Opioid abuse       Past Surgical History:   Procedure Laterality Date    BRAIN SURGERY      CHOLECYSTECTOMY      COLONOSCOPY N/A 06/06/2024    Procedure: COLON;  Surgeon: Kevin Causey MD;  Location: [facility] ENDOSCOPY;  Service: Gastroenterology;  Laterality: N/A;    COLONOSCOPY, WITH 1 OR MORE BIOPSIES  06/06/2024    Procedure: COLONOSCOPY, WITH 1 OR MORE BIOPSIES;  Surgeon: Kevin Causey MD;  Location: [facility] ENDOSCOPY;  Service: Gastroenterology;;    COLONOSCOPY, WITH DIRECTED SUBMUCOSAL INJECTION  06/06/2024    Procedure: COLONOSCOPY, WITH DIRECTED SUBMUCOSAL INJECTION;  Surgeon: Kevin Causey MD;  Location: [facility] ENDOSCOPY;  Service: Gastroenterology;;    DIAGNOSTIC LAPAROSCOPY N/A 7/18/2024    Procedure: LAPAROSCOPY, DIAGNOSTIC;  Surgeon: Rubio Cast MD;  Location: [facility];  Service: General;  Laterality: N/A;  WITH LAP LIVER BIOPSY - ULTRASOUND GUIDED    EGD, WITH CLOSED BIOPSY  06/06/2024    Procedure: EGD, WITH CLOSED BIOPSY;  Surgeon: Kevin Causey MD;  Location: [facility] ENDOSCOPY;  Service: Gastroenterology;;    ESOPHAGOGASTRODUODENOSCOPY N/A 06/06/2024    Procedure: DOUBLE;  Surgeon: Kevin Causey MD;  Location: [facility] ENDOSCOPY;  Service: Gastroenterology;  Laterality: N/A;    HYSTERECTOMY      INSERTION OF TUNNELED CENTRAL VENOUS CATHETER (CVC) WITH SUBCUTANEOUS PORT N/A 7/18/2024    Procedure: [id number];  Surgeon: Rubio Cast MD;  Location: [facility] OR;  Service: General;  Laterality: N/A;    KNEE SURGERY      THYROID LOBECTOMY       Social History     Socioeconomic History    Marital status: Single   Tobacco Use    Smoking status: Never      Passive exposure: Never    Smokeless tobacco: Never   Substance and Sexual Activity    Alcohol use: Never    Drug use: Not Currently     Types: Cocaine     Comment: 5-6 YEARS QUIT ON SUBOXONE      Family History   Problem Relation Name Age of Onset    Hypertension Mother      Hypertension Father      Stroke Father      Pancreatic cancer Brother      Cancer Paternal Grandfather        Review of patient's allergies indicates:   Allergen Reactions    Ketorolac Hives    Tramadol Hives    Vancomycin analogues Hives      Review of Systems   Constitutional:  Negative for activity change, fever and unexpected weight change.   HENT:  Negative for sore throat.    Eyes:  Negative for visual disturbance.   Respiratory:  Negative for cough and shortness of breath.    Cardiovascular:  Negative for chest pain.   Gastrointestinal:  Negative for abdominal pain, diarrhea, nausea and vomiting.   Endocrine: Negative for polyuria.   Genitourinary:  Negative for dysuria and hot flashes.   Integumentary:  Negative for rash.   Neurological:  Negative for weakness and headaches.   Hematological:  Negative for adenopathy.   Psychiatric/Behavioral:  Negative for confusion.          Objective:      Vitals:    07/25/24 1246   BP: 103/69   Pulse: 98   Resp: 20   Temp: 98.1 °F (36.7 °C)         Physical Exam  Constitutional:       General: She is not in acute distress.     Appearance: Normal appearance. She is not ill-appearing.   HENT:      Head: Normocephalic and atraumatic.      Nose: Nose normal.      Mouth/Throat:      Mouth: Mucous membranes are moist.      Pharynx: Oropharynx is clear.   Eyes:      Extraocular Movements: Extraocular movements intact.      Conjunctiva/sclera: Conjunctivae normal.      Pupils: Pupils are equal, round, and reactive to light.   Cardiovascular:      Rate and Rhythm: Normal rate and regular rhythm.      Pulses: Normal pulses.      Heart sounds: Normal heart sounds. No murmur heard.  Pulmonary:      Effort:  Pulmonary effort is normal. No respiratory distress.      Breath sounds: Normal breath sounds.   Abdominal:      General: There is no distension.      Palpations: Abdomen is soft.      Tenderness: There is no abdominal tenderness.   Musculoskeletal:         General: Normal range of motion.      Cervical back: Normal range of motion and neck supple.      Right lower leg: No edema.      Left lower leg: No edema.   Lymphadenopathy:      Cervical: No cervical adenopathy.   Skin:     General: Skin is warm and dry.   Neurological:      General: No focal deficit present.      Mental Status: She is alert and oriented to person, place, and time.         LABS AND IMAGING REVIEWED IN EPIC  1/3/24 CT Abd/Pelvis:  1. Postoperative changes are present compatible with a previous cholecystectomy. An elliptical calcification is noted within the gallbladder fossa and I suspect represents chronic calcification of a postoperative hematoma.  Clinical correlation is recommended.  2. Left-sided, nonobstructing nephrolithiasis as described above.  3. Chronic changes are present as described above.  See above comments.  5/13/24 CT Abd/Pelvis:  Changes most consistent with metastatic disease to the liver.   5/14/24 CT Chest:  Small indeterminate pulmonary nodules which were not seen in 2017. These can be followed on surveillance scans.   6/27/24 PET/CT:  1. Hepatic extensive metastatic involvement with hypermetabolic masses.  2. Descending colon concentric mural thickening and hypermetabolism is suggested to be the focus of colon carcinoma.  7/23/24 CT Abd/Pelvis:  1. A few subcentimeter non- calcified nodules are seen in the anterior and lateral middle lobe as well as posterior lower lobe, some of which are seen stable in the prior study. This are of concern for metastasis.   2. Multiple hypoenhancing lesions are again seen in both hepatic lobes with subtle decreased in size in the aggregate of lesions in the anterior right hepatic lobe  measuring 5.9 x 5 cm (AP x T) on series 3 image 29. These are ascribed to metastatic lesions.   3. There is stable 6 cm long annular wall thickening along the descending colon centered on series 6 image 32. This is consistent with the known primary colon malignancy.   4. Details and other findings as discussed above.      Pathology:  6/6/24 EGD/Colonoscopy Biopsy:  1. STOMACH BIOPSY:    -MODERATE CHRONIC GASTRITIS, FOCALLY ACTIVE (SEE COMMENT).    -NO EVIDENCE OF DYSPLASIA OR MALIGNANCY.   2. COLON STRICTURE BIOPSY:    -FRAGMENTS OF COLONIC-TYPE MUCOSA WITH AT LEAST HIGH GRADE DYSPLASIA, SUSPICIOUS   FOR ADENOCARCINOMA (SEE COMMENTS).   7/18/24 Laparoscopy:  1. PERITONEUM, BIOPSY:    -MESOTHELIUM-LINED MEMBRANOUS FIBROADIPOSE TISSUE. NO NEOPLASM.   2. LEFT LOBE OF LIVER, WEDGE BIOPSY:    -METASTATIC COLORECTAL ADENOCARCINOMA.     Assessment:   Metastatic Colorectal Adenocarcinoma - mets to liver, extensive and unresectable.    A. PET/CT 6/27/24 revealed hepatic extensive metastatic involvement with hypermetabolic masses and descending colon   B. Per Dr. Cast no role for diversion at this time given no symptoms. Colon cancer of the descending colon with unresectable liver metastasis, relatively bulky disease within the liver.    C. Laparoscopy and mediport placement performed 7/18/24, liver with metastatic colorectal adenocarcinoma, NGS pending   D. Plan for FOLFOX q2w x 12 cycles, to start 7/29        Plan:         - toxicity reviewed, okay to proceed with C1 FOLFOX q2w x 12 cycles on 7/29/24, treat only in [place]   - PET/CT after C6 and C12  - Rx Norco 10mg #90 sent to pharmacy with Morphine ER 15mg #60. Discussed she is to get no pain medication from other providers moving forward.   - NGS pending  RTC in 2 week with NP with labs/tox check/C2  Cbc, cmp, cea- 1 hr prior @ [facility]    I spent a total of 40 minutes on the day of the visit.This includes face to face time and non-face to face time preparing to see the  patient (eg, review of tests), obtaining and/or reviewing separately obtained history, documenting clinical information in the electronic or other health record, independently interpreting results and communicating results to the patient/family/caregiver, or care coordinator.        Elizabeth Kennedy LeJeune, MD  Hematology/Oncology   [facility]          Professional Services:  Viviane JENSEN LPN, acted solely as a scribe for and in the presence of Dr. Elizabeth Lejeune, who performed these services.

## 2024-07-24 NOTE — DISCHARGE INSTRUCTIONS
RETURN TO EMERGENCY DEPARTMENT WITHOUT FAIL, IF YOUR SYMPTOMS WORSEN, IF YOU GET NEW OR DIFFERENT SYMPTOMS, IF YOU ARE UNABLE TO FOLLOW UP AS DIRECTED, OR IF YOU HAVE ANY CONCERNS OR WORRIES.    Follow up with your oncologist and primary care provider.  Return if symptoms change or worsen.

## 2024-07-25 ENCOUNTER — OFFICE VISIT (OUTPATIENT)
Dept: HEMATOLOGY/ONCOLOGY | Facility: CLINIC | Age: 54
End: 2024-07-25
Payer: MEDICARE

## 2024-07-25 ENCOUNTER — LAB VISIT (OUTPATIENT)
Dept: LAB | Facility: HOSPITAL | Age: 54
End: 2024-07-25
Attending: STUDENT IN AN ORGANIZED HEALTH CARE EDUCATION/TRAINING PROGRAM
Payer: MEDICARE

## 2024-07-25 VITALS
HEIGHT: 71 IN | TEMPERATURE: 98 F | SYSTOLIC BLOOD PRESSURE: 103 MMHG | WEIGHT: 155.19 LBS | BODY MASS INDEX: 21.73 KG/M2 | OXYGEN SATURATION: 99 % | RESPIRATION RATE: 20 BRPM | HEART RATE: 98 BPM | DIASTOLIC BLOOD PRESSURE: 69 MMHG

## 2024-07-25 DIAGNOSIS — G89.3 CANCER RELATED PAIN: ICD-10-CM

## 2024-07-25 DIAGNOSIS — C18.9 METASTATIC COLON CANCER TO LIVER: Primary | ICD-10-CM

## 2024-07-25 DIAGNOSIS — C78.7 METASTATIC COLON CANCER TO LIVER: Primary | ICD-10-CM

## 2024-07-25 DIAGNOSIS — C18.9 COLON ADENOCARCINOMA: Primary | ICD-10-CM

## 2024-07-25 DIAGNOSIS — C18.9 COLON ADENOCARCINOMA: ICD-10-CM

## 2024-07-25 DIAGNOSIS — C78.7 METASTASIS TO LIVER: ICD-10-CM

## 2024-07-25 DIAGNOSIS — C78.7 METASTATIC COLON CANCER TO LIVER: ICD-10-CM

## 2024-07-25 DIAGNOSIS — C22.0 LIVER CARCINOMA: ICD-10-CM

## 2024-07-25 DIAGNOSIS — C18.9 METASTATIC COLON CANCER TO LIVER: ICD-10-CM

## 2024-07-25 LAB
ALBUMIN SERPL-MCNC: 2.6 G/DL (ref 3.5–5)
ALBUMIN/GLOB SERPL: 0.5 RATIO (ref 1.1–2)
ALP SERPL-CCNC: 1179 UNIT/L (ref 40–150)
ALT SERPL-CCNC: 22 UNIT/L (ref 0–55)
ANION GAP SERPL CALC-SCNC: 14 MEQ/L
AST SERPL-CCNC: 32 UNIT/L (ref 5–34)
BASOPHILS # BLD AUTO: 0.04 X10(3)/MCL
BASOPHILS NFR BLD AUTO: 0.4 %
BILIRUB SERPL-MCNC: 0.4 MG/DL
BUN SERPL-MCNC: 10 MG/DL (ref 9.8–20.1)
CALCIUM SERPL-MCNC: 10.3 MG/DL (ref 8.4–10.2)
CEA SERPL-MCNC: 1090.73 NG/ML (ref 0–3)
CHLORIDE SERPL-SCNC: 104 MMOL/L (ref 98–107)
CO2 SERPL-SCNC: 23 MMOL/L (ref 22–29)
CREAT SERPL-MCNC: 0.87 MG/DL (ref 0.55–1.02)
CREAT/UREA NIT SERPL: 11
EOSINOPHIL # BLD AUTO: 0.35 X10(3)/MCL (ref 0–0.9)
EOSINOPHIL NFR BLD AUTO: 3.8 %
ERYTHROCYTE [DISTWIDTH] IN BLOOD BY AUTOMATED COUNT: 18.9 % (ref 11.5–17)
GFR SERPLBLD CREATININE-BSD FMLA CKD-EPI: >60 ML/MIN/1.73/M2
GLOBULIN SER-MCNC: 4.8 GM/DL (ref 2.4–3.5)
GLUCOSE SERPL-MCNC: 121 MG/DL (ref 74–100)
HCT VFR BLD AUTO: 32.1 % (ref 37–47)
HGB BLD-MCNC: 9.7 G/DL (ref 12–16)
IMM GRANULOCYTES # BLD AUTO: 0.05 X10(3)/MCL (ref 0–0.04)
IMM GRANULOCYTES NFR BLD AUTO: 0.5 %
LYMPHOCYTES # BLD AUTO: 1.82 X10(3)/MCL (ref 0.6–4.6)
LYMPHOCYTES NFR BLD AUTO: 19.7 %
MCH RBC QN AUTO: 24.8 PG (ref 27–31)
MCHC RBC AUTO-ENTMCNC: 30.2 G/DL (ref 33–36)
MCV RBC AUTO: 82.1 FL (ref 80–94)
MONOCYTES # BLD AUTO: 0.91 X10(3)/MCL (ref 0.1–1.3)
MONOCYTES NFR BLD AUTO: 9.9 %
NEUTROPHILS # BLD AUTO: 6.05 X10(3)/MCL (ref 2.1–9.2)
NEUTROPHILS NFR BLD AUTO: 65.7 %
PLATELET # BLD AUTO: 384 X10(3)/MCL (ref 130–400)
PMV BLD AUTO: 8.7 FL (ref 7.4–10.4)
POTASSIUM SERPL-SCNC: 3.4 MMOL/L (ref 3.5–5.1)
PROT SERPL-MCNC: 7.4 GM/DL (ref 6.4–8.3)
RBC # BLD AUTO: 3.91 X10(6)/MCL (ref 4.2–5.4)
SODIUM SERPL-SCNC: 141 MMOL/L (ref 136–145)
WBC # BLD AUTO: 9.22 X10(3)/MCL (ref 4.5–11.5)

## 2024-07-25 PROCEDURE — 1160F RVW MEDS BY RX/DR IN RCRD: CPT | Mod: CPTII,S$GLB,, | Performed by: STUDENT IN AN ORGANIZED HEALTH CARE EDUCATION/TRAINING PROGRAM

## 2024-07-25 PROCEDURE — 85025 COMPLETE CBC W/AUTO DIFF WBC: CPT

## 2024-07-25 PROCEDURE — 80053 COMPREHEN METABOLIC PANEL: CPT

## 2024-07-25 PROCEDURE — 99215 OFFICE O/P EST HI 40 MIN: CPT | Mod: S$GLB,,, | Performed by: STUDENT IN AN ORGANIZED HEALTH CARE EDUCATION/TRAINING PROGRAM

## 2024-07-25 PROCEDURE — 82378 CARCINOEMBRYONIC ANTIGEN: CPT

## 2024-07-25 PROCEDURE — 99999 PR PBB SHADOW E&M-EST. PATIENT-LVL IV: CPT | Mod: PBBFAC,,, | Performed by: STUDENT IN AN ORGANIZED HEALTH CARE EDUCATION/TRAINING PROGRAM

## 2024-07-25 PROCEDURE — 3008F BODY MASS INDEX DOCD: CPT | Mod: CPTII,S$GLB,, | Performed by: STUDENT IN AN ORGANIZED HEALTH CARE EDUCATION/TRAINING PROGRAM

## 2024-07-25 PROCEDURE — 3074F SYST BP LT 130 MM HG: CPT | Mod: CPTII,S$GLB,, | Performed by: STUDENT IN AN ORGANIZED HEALTH CARE EDUCATION/TRAINING PROGRAM

## 2024-07-25 PROCEDURE — 36415 COLL VENOUS BLD VENIPUNCTURE: CPT

## 2024-07-25 PROCEDURE — 3078F DIAST BP <80 MM HG: CPT | Mod: CPTII,S$GLB,, | Performed by: STUDENT IN AN ORGANIZED HEALTH CARE EDUCATION/TRAINING PROGRAM

## 2024-07-25 PROCEDURE — 1159F MED LIST DOCD IN RCRD: CPT | Mod: CPTII,S$GLB,, | Performed by: STUDENT IN AN ORGANIZED HEALTH CARE EDUCATION/TRAINING PROGRAM

## 2024-07-25 RX ORDER — LACTULOSE 10 G/15ML
15 SOLUTION ORAL; RECTAL 2 TIMES DAILY
COMMUNITY
Start: 2024-06-26

## 2024-07-25 RX ORDER — PROCHLORPERAZINE EDISYLATE 5 MG/ML
10 INJECTION INTRAMUSCULAR; INTRAVENOUS ONCE AS NEEDED
OUTPATIENT
Start: 2024-07-29

## 2024-07-25 RX ORDER — FLUOROURACIL 50 MG/ML
400 INJECTION, SOLUTION INTRAVENOUS
OUTPATIENT
Start: 2024-07-29

## 2024-07-25 RX ORDER — EPINEPHRINE 0.3 MG/.3ML
0.3 INJECTION SUBCUTANEOUS ONCE AS NEEDED
OUTPATIENT
Start: 2024-07-29

## 2024-07-25 RX ORDER — DIPHENHYDRAMINE HYDROCHLORIDE 50 MG/ML
50 INJECTION INTRAMUSCULAR; INTRAVENOUS ONCE AS NEEDED
OUTPATIENT
Start: 2024-07-29

## 2024-07-25 RX ORDER — HEPARIN 100 UNIT/ML
500 SYRINGE INTRAVENOUS
OUTPATIENT
Start: 2024-07-31

## 2024-07-25 RX ORDER — SODIUM CHLORIDE 0.9 % (FLUSH) 0.9 %
10 SYRINGE (ML) INJECTION
OUTPATIENT
Start: 2024-07-29

## 2024-07-25 RX ORDER — HYDROCODONE BITARTRATE AND ACETAMINOPHEN 10; 325 MG/1; MG/1
1 TABLET ORAL EVERY 6 HOURS PRN
Qty: 90 TABLET | Refills: 0 | Status: SHIPPED | OUTPATIENT
Start: 2024-07-25

## 2024-07-25 RX ORDER — MORPHINE SULFATE 15 MG/1
15 TABLET, FILM COATED, EXTENDED RELEASE ORAL 2 TIMES DAILY
Qty: 60 TABLET | Refills: 0 | Status: SHIPPED | OUTPATIENT
Start: 2024-07-25

## 2024-07-25 RX ORDER — CLONAZEPAM 0.5 MG/1
0.5 TABLET ORAL 2 TIMES DAILY PRN
COMMUNITY
Start: 2024-07-16

## 2024-07-25 RX ORDER — HEPARIN 100 UNIT/ML
500 SYRINGE INTRAVENOUS
OUTPATIENT
Start: 2024-07-29

## 2024-07-25 RX ORDER — PROCHLORPERAZINE EDISYLATE 5 MG/ML
10 INJECTION INTRAMUSCULAR; INTRAVENOUS ONCE AS NEEDED
OUTPATIENT
Start: 2024-07-31

## 2024-07-25 RX ORDER — SODIUM CHLORIDE 0.9 % (FLUSH) 0.9 %
10 SYRINGE (ML) INJECTION
OUTPATIENT
Start: 2024-07-31

## 2024-07-29 ENCOUNTER — HOSPITAL ENCOUNTER (EMERGENCY)
Facility: HOSPITAL | Age: 54
Discharge: HOME | End: 2024-07-29
Payer: MEDICARE

## 2024-07-29 ENCOUNTER — NURSE TRIAGE (OUTPATIENT)
Dept: ADMINISTRATIVE | Facility: CLINIC | Age: 54
End: 2024-07-29
Payer: MEDICARE

## 2024-07-29 ENCOUNTER — INFUSION (OUTPATIENT)
Dept: INFUSION THERAPY | Facility: HOSPITAL | Age: 54
End: 2024-07-29
Attending: STUDENT IN AN ORGANIZED HEALTH CARE EDUCATION/TRAINING PROGRAM
Payer: MEDICARE

## 2024-07-29 VITALS
DIASTOLIC BLOOD PRESSURE: 75 MMHG | HEIGHT: 70 IN | HEART RATE: 80 BPM | BODY MASS INDEX: 21.83 KG/M2 | SYSTOLIC BLOOD PRESSURE: 114 MMHG | TEMPERATURE: 98 F | WEIGHT: 152.5 LBS | OXYGEN SATURATION: 96 % | RESPIRATION RATE: 18 BRPM

## 2024-07-29 VITALS
HEIGHT: 70 IN | HEART RATE: 62 BPM | RESPIRATION RATE: 18 BRPM | TEMPERATURE: 98 F | WEIGHT: 155 LBS | SYSTOLIC BLOOD PRESSURE: 114 MMHG | BODY MASS INDEX: 22.19 KG/M2 | DIASTOLIC BLOOD PRESSURE: 72 MMHG | OXYGEN SATURATION: 96 %

## 2024-07-29 DIAGNOSIS — R10.30 LOWER ABDOMINAL PAIN: Primary | ICD-10-CM

## 2024-07-29 DIAGNOSIS — C18.9 COLON ADENOCARCINOMA: Primary | ICD-10-CM

## 2024-07-29 PROCEDURE — 63600175 PHARM REV CODE 636 W HCPCS

## 2024-07-29 PROCEDURE — 96415 CHEMO IV INFUSION ADDL HR: CPT

## 2024-07-29 PROCEDURE — 63600175 PHARM REV CODE 636 W HCPCS: Performed by: STUDENT IN AN ORGANIZED HEALTH CARE EDUCATION/TRAINING PROGRAM

## 2024-07-29 PROCEDURE — 96413 CHEMO IV INFUSION 1 HR: CPT

## 2024-07-29 PROCEDURE — 99285 EMERGENCY DEPT VISIT HI MDM: CPT | Mod: 25

## 2024-07-29 PROCEDURE — 96367 TX/PROPH/DG ADDL SEQ IV INF: CPT

## 2024-07-29 PROCEDURE — 25000003 PHARM REV CODE 250: Performed by: STUDENT IN AN ORGANIZED HEALTH CARE EDUCATION/TRAINING PROGRAM

## 2024-07-29 PROCEDURE — 96374 THER/PROPH/DIAG INJ IV PUSH: CPT

## 2024-07-29 PROCEDURE — 25000003 PHARM REV CODE 250

## 2024-07-29 PROCEDURE — 96375 TX/PRO/DX INJ NEW DRUG ADDON: CPT

## 2024-07-29 PROCEDURE — 96416 CHEMO PROLONG INFUSE W/PUMP: CPT

## 2024-07-29 PROCEDURE — 96368 THER/DIAG CONCURRENT INF: CPT

## 2024-07-29 PROCEDURE — 96411 CHEMO IV PUSH ADDL DRUG: CPT

## 2024-07-29 RX ORDER — HYDROMORPHONE HYDROCHLORIDE 2 MG/ML
2 INJECTION, SOLUTION INTRAMUSCULAR; INTRAVENOUS; SUBCUTANEOUS
Status: COMPLETED | OUTPATIENT
Start: 2024-07-29 | End: 2024-07-29

## 2024-07-29 RX ORDER — HYDROCODONE BITARTRATE AND ACETAMINOPHEN 10; 325 MG/1; MG/1
1 TABLET ORAL EVERY 6 HOURS PRN
Status: DISCONTINUED | OUTPATIENT
Start: 2024-07-29 | End: 2024-07-29 | Stop reason: HOSPADM

## 2024-07-29 RX ORDER — FLUOROURACIL 50 MG/ML
400 INJECTION, SOLUTION INTRAVENOUS
Status: COMPLETED | OUTPATIENT
Start: 2024-07-29 | End: 2024-07-29

## 2024-07-29 RX ORDER — HEPARIN 100 UNIT/ML
500 SYRINGE INTRAVENOUS
Status: DISCONTINUED | OUTPATIENT
Start: 2024-07-29 | End: 2024-07-29 | Stop reason: HOSPADM

## 2024-07-29 RX ORDER — DIPHENHYDRAMINE HYDROCHLORIDE 50 MG/ML
50 INJECTION INTRAMUSCULAR; INTRAVENOUS ONCE AS NEEDED
Status: DISCONTINUED | OUTPATIENT
Start: 2024-07-29 | End: 2024-07-29 | Stop reason: HOSPADM

## 2024-07-29 RX ORDER — PROCHLORPERAZINE EDISYLATE 5 MG/ML
10 INJECTION INTRAMUSCULAR; INTRAVENOUS ONCE AS NEEDED
Status: DISCONTINUED | OUTPATIENT
Start: 2024-07-29 | End: 2024-07-29 | Stop reason: HOSPADM

## 2024-07-29 RX ORDER — EPINEPHRINE 0.3 MG/.3ML
0.3 INJECTION SUBCUTANEOUS ONCE AS NEEDED
Status: DISCONTINUED | OUTPATIENT
Start: 2024-07-29 | End: 2024-07-29 | Stop reason: HOSPADM

## 2024-07-29 RX ORDER — SODIUM CHLORIDE 0.9 % (FLUSH) 0.9 %
10 SYRINGE (ML) INJECTION
Status: DISCONTINUED | OUTPATIENT
Start: 2024-07-29 | End: 2024-07-29 | Stop reason: HOSPADM

## 2024-07-29 RX ORDER — LORAZEPAM 2 MG/ML
1 INJECTION INTRAMUSCULAR
Status: COMPLETED | OUTPATIENT
Start: 2024-07-29 | End: 2024-07-29

## 2024-07-29 RX ORDER — HYDROCODONE BITARTRATE AND ACETAMINOPHEN 10; 325 MG/1; MG/1
1 TABLET ORAL EVERY 6 HOURS PRN
Status: CANCELLED
Start: 2024-07-29

## 2024-07-29 RX ADMIN — HYDROCODONE BITARTRATE AND ACETAMINOPHEN 1 TABLET: 10; 325 TABLET ORAL at 09:07

## 2024-07-29 RX ADMIN — LORAZEPAM 1 MG: 2 INJECTION INTRAMUSCULAR; INTRAVENOUS at 09:07

## 2024-07-29 RX ADMIN — HYDROMORPHONE HYDROCHLORIDE 2 MG: 2 INJECTION INTRAMUSCULAR; INTRAVENOUS; SUBCUTANEOUS at 09:07

## 2024-07-29 RX ADMIN — DEXAMETHASONE SODIUM PHOSPHATE 0.25 MG: 10 INJECTION, SOLUTION INTRAMUSCULAR; INTRAVENOUS at 08:07

## 2024-07-29 RX ADMIN — FLUOROURACIL 4500 MG: 50 INJECTION, SOLUTION INTRAVENOUS at 12:07

## 2024-07-29 RX ADMIN — LEUCOVORIN CALCIUM 755 MG: 350 INJECTION, POWDER, LYOPHILIZED, FOR SOLUTION INTRAMUSCULAR; INTRAVENOUS at 09:07

## 2024-07-29 RX ADMIN — FLUOROURACIL 755 MG: 50 INJECTION, SOLUTION INTRAVENOUS at 11:07

## 2024-07-29 RX ADMIN — OXALIPLATIN 161 MG: 5 INJECTION, SOLUTION INTRAVENOUS at 09:07

## 2024-07-30 DIAGNOSIS — C18.9 METASTATIC COLON CANCER TO LIVER: Primary | ICD-10-CM

## 2024-07-30 DIAGNOSIS — C78.7 METASTATIC COLON CANCER TO LIVER: Primary | ICD-10-CM

## 2024-07-30 DIAGNOSIS — F14.120 COCAINE ABUSE WITH INTOXICATION, UNCOMPLICATED: ICD-10-CM

## 2024-07-30 NOTE — TELEPHONE ENCOUNTER
Pt's sister calling in, pt had chemo infusion today and now having burning and pain in her stomach. Rates pain 8/10. Dispo is ED now. Pt verbalized understanding. Advised to call back with further concerns.    Reason for Disposition   [1] SEVERE pain (e.g., excruciating) AND [2] present > 1 hour    Additional Information   Negative: Shock suspected (e.g., cold/pale/clammy skin, too weak to stand, low BP, rapid pulse)   Negative: Difficult to awaken or acting confused (e.g., disoriented, slurred speech)   Negative: Passed out (e.g., fainted, lost consciousness, blacked out and was not responding)   Negative: Sounds like a life-threatening emergency to the triager    Protocols used: Abdominal Pain - Female-A-AH

## 2024-07-30 NOTE — ED PROVIDER NOTES
Encounter Date: 7/29/2024       History     Chief Complaint   Patient presents with    Abdominal Pain     Hx of colon and liver cancer. Had 1st infusion today went home and woke up with bad abdominal pain. Not sure if it's just a side effect of the infusion or something else.      54-year-old female, well known to me, presents complaining of lower abdominal pain.  She has been having pain like this off and on for quite some time.  She has a history of colon cancer, and just received her 1st chemotherapy infusion today.  She is on long-acting morphine at home.  She denies any constipation or diarrhea.  She denies any nausea, vomiting, fever or chills.  She denies any urinary symptoms.    The history is provided by the patient.     Review of patient's allergies indicates:   Allergen Reactions    Ketorolac Hives    Tramadol Hives    Vancomycin analogues Hives     Past Medical History:   Diagnosis Date    Brain tumor     Cervical ca     Depression     Malignant neoplasm of colon, unspecified     Opioid abuse      Past Surgical History:   Procedure Laterality Date    BRAIN SURGERY      CHOLECYSTECTOMY      COLONOSCOPY N/A 06/06/2024    Procedure: COLON;  Surgeon: Kevin Causey MD;  Location: [facility] ENDOSCOPY;  Service: Gastroenterology;  Laterality: N/A;    COLONOSCOPY, WITH 1 OR MORE BIOPSIES  06/06/2024    Procedure: COLONOSCOPY, WITH 1 OR MORE BIOPSIES;  Surgeon: Kevin Causey MD;  Location: [facility] ENDOSCOPY;  Service: Gastroenterology;;    COLONOSCOPY, WITH DIRECTED SUBMUCOSAL INJECTION  06/06/2024    Procedure: COLONOSCOPY, WITH DIRECTED SUBMUCOSAL INJECTION;  Surgeon: Kevin Causey MD;  Location: [facility] ENDOSCOPY;  Service: Gastroenterology;;    DIAGNOSTIC LAPAROSCOPY N/A 7/18/2024    Procedure: LAPAROSCOPY, DIAGNOSTIC;  Surgeon: Rubio Cast MD;  Location: [facility];  Service: General;  Laterality: N/A;  WITH LAP LIVER BIOPSY - ULTRASOUND GUIDED    EGD, WITH CLOSED BIOPSY   06/06/2024    Procedure: EGD, WITH CLOSED BIOPSY;  Surgeon: Kevin Causey MD;  Location: [facility] ENDOSCOPY;  Service: Gastroenterology;;    ESOPHAGOGASTRODUODENOSCOPY N/A 06/06/2024    Procedure: DOUBLE;  Surgeon: Kevin Causey MD;  Location: [facility] ENDOSCOPY;  Service: Gastroenterology;  Laterality: N/A;    HYSTERECTOMY      INSERTION OF TUNNELED CENTRAL VENOUS CATHETER (CVC) WITH SUBCUTANEOUS PORT N/A 7/18/2024    Procedure: [id number];  Surgeon: Rubio Cast MD;  Location: [facility] OR;  Service: General;  Laterality: N/A;    KNEE SURGERY      THYROID LOBECTOMY       Family History   Problem Relation Name Age of Onset    Hypertension Mother      Hypertension Father      Stroke Father      Pancreatic cancer Brother      Cancer Paternal Grandfather       Social History     Tobacco Use    Smoking status: Never     Passive exposure: Never    Smokeless tobacco: Never   Substance Use Topics    Alcohol use: Never    Drug use: Not Currently     Types: Cocaine     Comment: 5-6 YEARS QUIT ON SUBOXONE     Review of Systems   Constitutional:  Negative for chills and fever.   HENT:  Negative for sore throat.    Respiratory:  Negative for shortness of breath.    Cardiovascular:  Negative for chest pain.   Gastrointestinal:  Positive for abdominal pain. Negative for abdominal distention, constipation, diarrhea, nausea and vomiting.   Genitourinary:  Negative for dysuria.   Musculoskeletal:  Negative for back pain.   Skin:  Negative for rash.   Neurological:  Negative for weakness.   Hematological:  Does not bruise/bleed easily.   All other systems reviewed and are negative.      Physical Exam     Initial Vitals [07/29/24 2041]   BP Pulse Resp Temp SpO2   (!) 145/84 78 16 98.1 °F (36.7 °C) 96 %      MAP       --         Physical Exam    Nursing note and vitals reviewed.  Constitutional: Vital signs are normal. She appears well-developed and well-nourished. She is cooperative.   HENT:   Head:  Normocephalic and atraumatic.   Mouth/Throat: Oropharynx is clear and moist.   Eyes: Conjunctivae, EOM and lids are normal. Pupils are equal, round, and reactive to light.   Neck: Trachea normal. Neck supple.   Normal range of motion.  Cardiovascular:  Normal rate, regular rhythm, normal heart sounds and intact distal pulses.           Pulmonary/Chest: Breath sounds normal.   Abdominal: Abdomen is soft. Bowel sounds are normal. She exhibits no distension and no mass. There is no abdominal tenderness. There is no rebound and no guarding.   Musculoskeletal:         General: Normal range of motion.      Cervical back: Normal, normal range of motion and neck supple.      Lumbar back: Normal.     Neurological: She is alert and oriented to person, place, and time. She has normal strength. Coordination normal. GCS score is 15. GCS eye subscore is 4. GCS verbal subscore is 5. GCS motor subscore is 6.   Skin: Skin is warm, dry and intact. Capillary refill takes less than 2 seconds.   Psychiatric: She has a normal mood and affect. Her speech is normal and behavior is normal. Judgment and thought content normal. Cognition and memory are normal.         ED Course   Procedures  Labs Reviewed   URINALYSIS, REFLEX TO URINE CULTURE          Imaging Results              CT Abdomen Pelvis  Without Contrast (Preliminary result)  Result time 07/29/24 21:53:02      Preliminary result by Dinh Cisneros MD (07/29/24 21:53:02)                   Narrative:    START OF REPORT:  Technique: CT of the abdomen and pelvis was performed with axial images as well as sagittal and coronal reconstruction images without intravenous contrast.    Comparison: Comparison is with study dated 2024-07-23 20:59:31.    Clinical History: Abdominal Pain (Hx of colon and liver cancer. Had 1st infusion today went home and woke up with bad abdominal pain. Not sure if it's just a side effect of the infusion or something else. ).    Dosage Information: Automated  Exposure Control was utilized.    Findings:  Technical notes: Please note the absence of intravenous contrast significantly decreases sensitivity and specificity of the study for solid organ and gastrointestinal.  Lines and Tubes: Right-sided central venous port and catheter are seen with distal tip in the atriocaval junction. Drainage catheters are again noted with tip of the catheters in the perihepatic and pelvic regions.  Thorax:  Lungs: Moderate linear opacity is present at the visualized lung bases, consistent with scarring and atelectasis. There is a stable small granuloma in the posterior left lower lobe. A few stable subcentimeter non-calcified nodules are seen in the anterior middle lobe likely metastatic disease from known cancer.  Pleura: No effusions or thickening.  Heart: The heart size is within normal limits. Minimal coronary artery calcification is seen.  Abdomen:  Abdominal Wall: No abdominal wall pathology is seen.  Liver: There are too numerous to count lesions throughout the liver similar to the prior study consistent with metastatic disease. Follow-up as clinically indicated.  Biliary System: No intrahepatic or extrahepatic biliary duct dilatation is seen.  Gallbladder: Surgical clips are seen in the gallbladder fossa which may reflect prior cholecystectomy correlate with surgical history and visual inspection.  Pancreas: There is pronounced fatty infiltration of the pancreas.  Spleen: The spleen appears unremarkable.  Adrenals: The adrenal glands appear unremarkable.  Kidneys: The right kidney appears unremarkable with no stones cysts masses or hydronephrosis. A stable single stone measuring 7.7 mm is seen on Image 34, Series 6 in the lower pole of the left kidney. The left kidney otherwise appears unremarkable with no cysts masses or hydronephrosis identified.  Aorta: There is mild calcification of the.  Bowel:  Esophagus: The visualized esophagus appears unremarkable.  Stomach: The stomach  appears unremarkable.  Duodenum: Unremarkable appearing duodenum.  Small Bowel: The small bowel appears unremarkable.  Colon: There is moderate stool in the colon which could reflect an element of constipation. Multiple diverticula are seen in the splenic flexure through to the descending. No associated inflammatory stranding is seen to suggest diverticulitis. No significant interval changes identified in potential colon findings.  Appendix: The appendix appears unremarkable seen on Image 69, Series 3 through Image 73, Series 3.  Peritoneum: No intraperitoneal free air or ascites is seen.    Pelvis:  Bladder: The bladder is nondistended and cannot be definitively evaluated.  Female:  Uterus: The uterus is not identified.  Ovaries: No adnexal masses are seen.    Bony structures:  Dorsal Spine: There is mild spondylosis of the visualized dorsal spine.  Bony Pelvis: There is mild degenerative change of the bilateral hips.    Miscellaneous: There is stable mild compression deformity of T9.      Impression:  1. Stable metastatic disease with no acute intra-abdominal or pelvic abnormality identified. Details as above.                                         Medications   HYDROmorphone (PF) injection 2 mg (2 mg Intravenous Given 7/29/24 2122)   LORazepam injection 1 mg (1 mg Intravenous Given 7/29/24 2122)     Medical Decision Making  Lower abdominal pain, history of colon CA  Differential diagnosis:  Recurrence of chronic abdominal pain, bowel obstruction, UTI, ureterolithiasis, diverticulitis  Analgesia  Urinalysis, CT    Amount and/or Complexity of Data Reviewed  Radiology: ordered. Decision-making details documented in ED Course.  Discussion of management or test interpretation with external provider(s): Patient was feeling better.  CT shows no acute abnormalities.  She spilled the urine specimen.  She has no urinary complaints, so I believe it is safe to discharge her home    Risk  Prescription drug management.                ED Course as of 07/29/24 2203 Mon Jul 29, 2024 2200 CT Abdomen Pelvis  Without Contrast  There are no acute intra-abdominal abnormalities [TM]      ED Course User Index  [TM] Guido Nelson MD                           Clinical Impression:  Final diagnoses:  [R10.30] Lower abdominal pain (Primary)          ED Disposition Condition    Discharge Good          ED Prescriptions    None       Follow-up Information       Follow up With Specialties Details Why Contact Info    Amado Baig III, MD Family Medicine Call in 1 day  [address] [facility] [id number]  [phone]               Guido Nelson MD  07/29/24 2203

## 2024-07-31 ENCOUNTER — OFFICE VISIT (OUTPATIENT)
Dept: SURGICAL ONCOLOGY | Facility: CLINIC | Age: 54
End: 2024-07-31
Payer: MEDICARE

## 2024-07-31 VITALS
BODY MASS INDEX: 21.64 KG/M2 | DIASTOLIC BLOOD PRESSURE: 74 MMHG | HEIGHT: 70 IN | HEART RATE: 103 BPM | SYSTOLIC BLOOD PRESSURE: 106 MMHG | WEIGHT: 151.19 LBS

## 2024-07-31 DIAGNOSIS — C78.7 METASTATIC COLON CANCER TO LIVER: Primary | ICD-10-CM

## 2024-07-31 DIAGNOSIS — C18.9 METASTATIC COLON CANCER TO LIVER: Primary | ICD-10-CM

## 2024-07-31 PROCEDURE — 99999 PR PBB SHADOW E&M-EST. PATIENT-LVL III: CPT | Mod: PBBFAC,,, | Performed by: SURGERY

## 2024-07-31 PROCEDURE — 3078F DIAST BP <80 MM HG: CPT | Mod: CPTII,S$GLB,, | Performed by: SURGERY

## 2024-07-31 PROCEDURE — 3008F BODY MASS INDEX DOCD: CPT | Mod: CPTII,S$GLB,, | Performed by: SURGERY

## 2024-07-31 PROCEDURE — 3074F SYST BP LT 130 MM HG: CPT | Mod: CPTII,S$GLB,, | Performed by: SURGERY

## 2024-07-31 PROCEDURE — 99213 OFFICE O/P EST LOW 20 MIN: CPT | Mod: S$GLB,,, | Performed by: SURGERY

## 2024-07-31 PROCEDURE — 1159F MED LIST DOCD IN RCRD: CPT | Mod: CPTII,S$GLB,, | Performed by: SURGERY

## 2024-07-31 NOTE — PROGRESS NOTES
History & Physical    CHIEF COMPLAINT:  METASTATIC COLON CANCER    History of Present Illness:  54 year-old-female referred by Dr. Lejeune.  Patient presented to the ER in May with complaints of abdominal pain.  CT abd/pel with contrast showed changes most consistent with metastatic disease to the liver.  CT chest without contrast showed small indeterminate pulmonary nodules which were not seen in 2017.  IR then performed liver biopsy, pathology revealed necrotic carcinoma.  PET scan showed hepatic extensive metastatic involvement with hypermetabolic masses; descending colon concentric mural thickening and hypermetabolism is suggested to be the focus of colon carcinoma.  Colonoscopy performed, a malignant-appearing, intrinsic severe stenosis measuring of unknown length x 4 mm (inner diameter) was found in the mid descending colon and was non-traversed. Biopsies were taken with a cold forceps for histology. For location marking, one hemostatic clip was successfully placed.  Biopsy of the colon stricture revealed fragments of colonic-type mucosa with at least high grade dysplasia, suspicious for adenocarcinoma.  .58,  302.04,  58.4 and AFP <2.00.  Patient has a history of cervical cancer x 15 years ago.  At the end of June liver enzymes were elevated: total bilirubin 1.5, ALP 1187,  and .     The patient tolerated colon prep without difficulty, denies abdominal cramping nausea or vomiting.  She is taking stool softeners for bowel movements and having these regularly.    Patient underwent MediPort placement and port is functioning well.  She denies any abdominal pain, cramping, nausea, vomiting.  She is having bowel movements.    Review of patient's allergies indicates:   Allergen Reactions    Ketorolac Hives    Tramadol Hives    Vancomycin analogues Hives       Current Outpatient Medications   Medication Sig Dispense Refill    clonazePAM (KLONOPIN) 0.5 MG tablet Take 0.5 mg by mouth 2  (two) times daily as needed.      dicyclomine (BENTYL) 20 mg tablet Take 1 tablet (20 mg total) by mouth every 6 (six) hours as needed (Abdominal Pain). 20 tablet 0    HYDROcodone-acetaminophen (NORCO)  mg per tablet Take 1 tablet by mouth every 6 (six) hours as needed for Pain. 90 tablet 0    lactulose (CHRONULAC) 10 gram/15 mL solution Take 15 mLs by mouth 2 (two) times daily.      loperamide (IMODIUM) 2 mg capsule Take 1 capsule (2 mg total) by mouth 4 (four) times daily as needed for Diarrhea. 20 capsule 2    morphine (MS CONTIN) 15 MG 12 hr tablet Take 1 tablet (15 mg total) by mouth 2 (two) times daily. 60 tablet 0    OLANZapine (ZYPREXA) 5 MG tablet Take 1 tablet (5 mg total) by mouth nightly. 10 tablet 2    ondansetron (ZOFRAN) 8 MG tablet Take 1 tablet (8 mg total) by mouth every 8 (eight) hours as needed for Nausea. 30 tablet 2    prochlorperazine (COMPAZINE) 10 MG tablet Take 1 tablet (10 mg total) by mouth every 6 (six) hours as needed (Nausea). 30 tablet 1    traZODone (DESYREL) 100 MG tablet Take 2 tablets by mouth every evening.       No current facility-administered medications for this visit.     Facility-Administered Medications Ordered in Other Visits   Medication Dose Route Frequency Provider Last Rate Last Admin    heparin, porcine (PF) 100 unit/mL injection flush 500 Units  5 mL Intravenous Once Rubio Cast MD        heparin, porcine (PF) 100 unit/mL injection flush 500 Units  500 Units Intravenous PRN Lejeune, Elizabeth Kennedy, MD   500 Units at 07/30/24 1500       Past Medical History:   Diagnosis Date    Brain tumor     Cervical ca     Depression     Malignant neoplasm of colon, unspecified     Opioid abuse      Past Surgical History:   Procedure Laterality Date    BRAIN SURGERY      CHOLECYSTECTOMY      COLONOSCOPY N/A 06/06/2024    Procedure: COLON;  Surgeon: Kevin Causey MD;  Location: [facility] ENDOSCOPY;  Service: Gastroenterology;  Laterality: N/A;     COLONOSCOPY, WITH 1 OR MORE BIOPSIES  06/06/2024    Procedure: COLONOSCOPY, WITH 1 OR MORE BIOPSIES;  Surgeon: Kevin Causey MD;  Location: [facility] ENDOSCOPY;  Service: Gastroenterology;;    COLONOSCOPY, WITH DIRECTED SUBMUCOSAL INJECTION  06/06/2024    Procedure: COLONOSCOPY, WITH DIRECTED SUBMUCOSAL INJECTION;  Surgeon: Kevin Causey MD;  Location: [facility] ENDOSCOPY;  Service: Gastroenterology;;    DIAGNOSTIC LAPAROSCOPY N/A 7/18/2024    Procedure: LAPAROSCOPY, DIAGNOSTIC;  Surgeon: Rubio Cast MD;  Location: [facility];  Service: General;  Laterality: N/A;  WITH LAP LIVER BIOPSY - ULTRASOUND GUIDED    EGD, WITH CLOSED BIOPSY  06/06/2024    Procedure: EGD, WITH CLOSED BIOPSY;  Surgeon: Kevin Causey MD;  Location: [facility] ENDOSCOPY;  Service: Gastroenterology;;    ESOPHAGOGASTRODUODENOSCOPY N/A 06/06/2024    Procedure: DOUBLE;  Surgeon: Kevin Causey MD;  Location: [facility] ENDOSCOPY;  Service: Gastroenterology;  Laterality: N/A;    HYSTERECTOMY      INSERTION OF TUNNELED CENTRAL VENOUS CATHETER (CVC) WITH SUBCUTANEOUS PORT N/A 7/18/2024    Procedure: [id number];  Surgeon: Rubio Cast MD;  Location: [facility];  Service: General;  Laterality: N/A;    KNEE SURGERY      THYROID LOBECTOMY       Family History   Problem Relation Name Age of Onset    Hypertension Mother      Hypertension Father      Stroke Father      Pancreatic cancer Brother      Cancer Paternal Grandfather       Social History     Tobacco Use    Smoking status: Never     Passive exposure: Never    Smokeless tobacco: Never   Substance Use Topics    Alcohol use: Never    Drug use: Not Currently     Types: Cocaine     Comment: 5-6 YEARS QUIT ON SUBOXONE        Review of Systems:  Review of Systems   Constitutional:  Negative for appetite change, chills, diaphoresis and fever.   HENT:  Negative for congestion, drooling, ear discharge, ear pain and hearing loss.    Eyes:  Negative for  discharge.   Respiratory:  Negative for apnea, cough, choking, chest tightness, shortness of breath and stridor.    Cardiovascular:  Negative for chest pain, palpitations and leg swelling.   Endocrine: Negative for cold intolerance and heat intolerance.   Genitourinary:  Negative for difficulty urinating, dyspareunia, dysuria and hematuria.   Musculoskeletal:  Negative for arthralgias, gait problem and joint swelling.   Skin:  Negative for color change and rash.   Neurological:  Negative for dizziness, tremors, seizures, syncope, facial asymmetry, speech difficulty, light-headedness, numbness and headaches.   Psychiatric/Behavioral:  Negative for agitation and confusion.           Objective     Vital Signs (Most Recent)              Physical Exam:  Physical Exam  Constitutional:       General: She is not in acute distress.     Appearance: Normal appearance. She is not toxic-appearing.   HENT:      Head: Normocephalic and atraumatic.      Right Ear: External ear normal.      Left Ear: External ear normal.      Mouth/Throat:      Mouth: Mucous membranes are moist.   Eyes:      General: No scleral icterus.     Conjunctiva/sclera: Conjunctivae normal.      Pupils: Pupils are equal, round, and reactive to light.   Cardiovascular:      Rate and Rhythm: Normal rate and regular rhythm.      Pulses: Normal pulses.   Pulmonary:      Effort: Pulmonary effort is normal. No respiratory distress.      Breath sounds: Normal breath sounds. No stridor. No wheezing or rhonchi.   Abdominal:      General: Abdomen is flat. There is no distension.      Palpations: Abdomen is soft. There is no mass.      Tenderness: There is no abdominal tenderness. There is no guarding or rebound.      Hernia: No hernia is present.   Musculoskeletal:         General: No swelling or tenderness. Normal range of motion.      Cervical back: Normal range of motion and neck supple.      Right lower leg: No edema.      Left lower leg: No edema.   Skin:      General: Skin is warm.      Capillary Refill: Capillary refill takes less than 2 seconds.      Coloration: Skin is not jaundiced or pale.      Findings: No erythema or rash.   Neurological:      General: No focal deficit present.      Mental Status: She is alert and oriented to person, place, and time.      Gait: Gait normal.   Psychiatric:         Mood and Affect: Mood normal.         Behavior: Behavior normal.         Judgment: Judgment normal.              Assessment and Plan     Colon cancer of the descending colon with unresectable liver metastasis, relatively bulky disease within the liver.  Biopsy of the liver was equivocal by IR.  No obstructive symptoms, tolerated colon prep for colonoscopy, no evidence of obstruction on imaging    Diagnosis, staging, prognosis and treatment guidelines for colon cancer with unresectable liver metastasis were discussed with the patient in detail, all questions were addressed.    No role for diversion at this time, no obstructive symptoms or imaging suggestion of obstruction.  Most pressing issue is obtaining firm diagnosis as well as beginning chemotherapy.    Low residue diet, continue stool softeners    Continue chemotherapy, return to clinic p.r.n.      Rubio Cast MD  Surgical Oncology  Complex General, Gastrointestinal and Hepatobiliary Surgery

## 2024-08-02 LAB
ONEOME COMMENT: NORMAL
ONEOME METHOD: NORMAL

## 2024-08-05 ENCOUNTER — INFUSION (OUTPATIENT)
Dept: INFUSION THERAPY | Facility: HOSPITAL | Age: 54
End: 2024-08-05
Payer: MEDICARE

## 2024-08-05 ENCOUNTER — LAB VISIT (OUTPATIENT)
Dept: LAB | Facility: HOSPITAL | Age: 54
End: 2024-08-05
Attending: STUDENT IN AN ORGANIZED HEALTH CARE EDUCATION/TRAINING PROGRAM
Payer: MEDICARE

## 2024-08-05 ENCOUNTER — OFFICE VISIT (OUTPATIENT)
Dept: HEMATOLOGY/ONCOLOGY | Facility: CLINIC | Age: 54
End: 2024-08-05
Payer: MEDICARE

## 2024-08-05 VITALS
WEIGHT: 146.81 LBS | SYSTOLIC BLOOD PRESSURE: 101 MMHG | BODY MASS INDEX: 20.55 KG/M2 | TEMPERATURE: 98 F | DIASTOLIC BLOOD PRESSURE: 72 MMHG | HEART RATE: 91 BPM | RESPIRATION RATE: 20 BRPM | OXYGEN SATURATION: 99 % | HEIGHT: 71 IN

## 2024-08-05 VITALS
BODY MASS INDEX: 20.25 KG/M2 | OXYGEN SATURATION: 99 % | HEIGHT: 71 IN | SYSTOLIC BLOOD PRESSURE: 101 MMHG | RESPIRATION RATE: 20 BRPM | HEART RATE: 91 BPM | WEIGHT: 144.63 LBS | DIASTOLIC BLOOD PRESSURE: 72 MMHG | TEMPERATURE: 98 F

## 2024-08-05 DIAGNOSIS — F14.120 COCAINE ABUSE WITH INTOXICATION, UNCOMPLICATED: ICD-10-CM

## 2024-08-05 DIAGNOSIS — E83.52 HYPERCALCEMIA: ICD-10-CM

## 2024-08-05 DIAGNOSIS — C78.7 METASTATIC COLON CANCER TO LIVER: ICD-10-CM

## 2024-08-05 DIAGNOSIS — E83.52 HYPERCALCEMIA: Primary | ICD-10-CM

## 2024-08-05 DIAGNOSIS — C18.9 COLON ADENOCARCINOMA: ICD-10-CM

## 2024-08-05 DIAGNOSIS — C18.9 METASTATIC COLON CANCER TO LIVER: Primary | ICD-10-CM

## 2024-08-05 DIAGNOSIS — C78.7 METASTATIC COLON CANCER TO LIVER: Primary | ICD-10-CM

## 2024-08-05 DIAGNOSIS — C18.9 METASTATIC COLON CANCER TO LIVER: ICD-10-CM

## 2024-08-05 DIAGNOSIS — C78.7 METASTASIS TO LIVER: ICD-10-CM

## 2024-08-05 LAB
ALBUMIN SERPL-MCNC: 2.9 G/DL (ref 3.5–5)
ALBUMIN/GLOB SERPL: 0.5 RATIO (ref 1.1–2)
ALP SERPL-CCNC: 1332 UNIT/L (ref 40–150)
ALT SERPL-CCNC: 23 UNIT/L (ref 0–55)
AMPHET UR QL SCN: NEGATIVE
ANION GAP SERPL CALC-SCNC: 13 MEQ/L
AST SERPL-CCNC: 32 UNIT/L (ref 5–34)
BARBITURATE SCN PRESENT UR: NEGATIVE
BASOPHILS # BLD AUTO: 0.06 X10(3)/MCL
BASOPHILS NFR BLD AUTO: 0.5 %
BENZODIAZ UR QL SCN: NEGATIVE
BILIRUB SERPL-MCNC: 0.6 MG/DL
BUN SERPL-MCNC: 14 MG/DL (ref 9.8–20.1)
CALCIUM SERPL-MCNC: 11.5 MG/DL (ref 8.4–10.2)
CANNABINOIDS UR QL SCN: NEGATIVE
CEA SERPL-MCNC: 1765.28 NG/ML (ref 0–3)
CHLORIDE SERPL-SCNC: 100 MMOL/L (ref 98–107)
CO2 SERPL-SCNC: 27 MMOL/L (ref 22–29)
COCAINE UR QL SCN: NEGATIVE
CREAT SERPL-MCNC: 0.88 MG/DL (ref 0.55–1.02)
CREAT/UREA NIT SERPL: 16
EOSINOPHIL # BLD AUTO: 0.24 X10(3)/MCL (ref 0–0.9)
EOSINOPHIL NFR BLD AUTO: 2.1 %
ERYTHROCYTE [DISTWIDTH] IN BLOOD BY AUTOMATED COUNT: 17.2 % (ref 11.5–17)
FENTANYL UR QL SCN: POSITIVE
GFR SERPLBLD CREATININE-BSD FMLA CKD-EPI: >60 ML/MIN/1.73/M2
GLOBULIN SER-MCNC: 5.6 GM/DL (ref 2.4–3.5)
GLUCOSE SERPL-MCNC: 160 MG/DL (ref 74–100)
HCT VFR BLD AUTO: 33.5 % (ref 37–47)
HGB BLD-MCNC: 10.2 G/DL (ref 12–16)
IMM GRANULOCYTES # BLD AUTO: 0.05 X10(3)/MCL (ref 0–0.04)
IMM GRANULOCYTES NFR BLD AUTO: 0.4 %
LYMPHOCYTES # BLD AUTO: 2.18 X10(3)/MCL (ref 0.6–4.6)
LYMPHOCYTES NFR BLD AUTO: 19.1 %
MCH RBC QN AUTO: 24.9 PG (ref 27–31)
MCHC RBC AUTO-ENTMCNC: 30.4 G/DL (ref 33–36)
MCV RBC AUTO: 81.7 FL (ref 80–94)
MDMA UR QL SCN: NEGATIVE
MONOCYTES # BLD AUTO: 0.87 X10(3)/MCL (ref 0.1–1.3)
MONOCYTES NFR BLD AUTO: 7.6 %
NEUTROPHILS # BLD AUTO: 8.03 X10(3)/MCL (ref 2.1–9.2)
NEUTROPHILS NFR BLD AUTO: 70.3 %
OPIATES UR QL SCN: POSITIVE
PCP UR QL: NEGATIVE
PH UR: 6 [PH] (ref 3–11)
PLATELET # BLD AUTO: 437 X10(3)/MCL (ref 130–400)
PMV BLD AUTO: 9.1 FL (ref 7.4–10.4)
POTASSIUM SERPL-SCNC: 3.3 MMOL/L (ref 3.5–5.1)
PROT SERPL-MCNC: 8.5 GM/DL (ref 6.4–8.3)
RBC # BLD AUTO: 4.1 X10(6)/MCL (ref 4.2–5.4)
SODIUM SERPL-SCNC: 140 MMOL/L (ref 136–145)
SPECIFIC GRAVITY, URINE AUTO (.000) (OHS): 1.02 (ref 1–1.03)
WBC # BLD AUTO: 11.43 X10(3)/MCL (ref 4.5–11.5)

## 2024-08-05 PROCEDURE — A4216 STERILE WATER/SALINE, 10 ML: HCPCS

## 2024-08-05 PROCEDURE — 36415 COLL VENOUS BLD VENIPUNCTURE: CPT

## 2024-08-05 PROCEDURE — 3008F BODY MASS INDEX DOCD: CPT | Mod: CPTII,S$GLB,,

## 2024-08-05 PROCEDURE — 96360 HYDRATION IV INFUSION INIT: CPT

## 2024-08-05 PROCEDURE — 80307 DRUG TEST PRSMV CHEM ANLYZR: CPT

## 2024-08-05 PROCEDURE — 3074F SYST BP LT 130 MM HG: CPT | Mod: CPTII,S$GLB,,

## 2024-08-05 PROCEDURE — 25000003 PHARM REV CODE 250

## 2024-08-05 PROCEDURE — 3078F DIAST BP <80 MM HG: CPT | Mod: CPTII,S$GLB,,

## 2024-08-05 PROCEDURE — 99215 OFFICE O/P EST HI 40 MIN: CPT | Mod: S$GLB,,,

## 2024-08-05 PROCEDURE — 63600175 PHARM REV CODE 636 W HCPCS

## 2024-08-05 PROCEDURE — 82378 CARCINOEMBRYONIC ANTIGEN: CPT

## 2024-08-05 PROCEDURE — 85025 COMPLETE CBC W/AUTO DIFF WBC: CPT

## 2024-08-05 PROCEDURE — 80053 COMPREHEN METABOLIC PANEL: CPT

## 2024-08-05 PROCEDURE — 99999 PR PBB SHADOW E&M-EST. PATIENT-LVL IV: CPT | Mod: PBBFAC,,,

## 2024-08-05 RX ORDER — SODIUM CHLORIDE 0.9 % (FLUSH) 0.9 %
10 SYRINGE (ML) INJECTION
Status: DISCONTINUED | OUTPATIENT
Start: 2024-08-05 | End: 2024-08-05 | Stop reason: HOSPADM

## 2024-08-05 RX ORDER — HEPARIN 100 UNIT/ML
500 SYRINGE INTRAVENOUS
Status: DISCONTINUED | OUTPATIENT
Start: 2024-08-05 | End: 2024-08-05 | Stop reason: HOSPADM

## 2024-08-05 RX ORDER — SODIUM CHLORIDE 0.9 % (FLUSH) 0.9 %
10 SYRINGE (ML) INJECTION
Status: CANCELLED | OUTPATIENT
Start: 2024-08-05

## 2024-08-05 RX ORDER — HEPARIN 100 UNIT/ML
500 SYRINGE INTRAVENOUS
Status: CANCELLED | OUTPATIENT
Start: 2024-08-05

## 2024-08-05 RX ORDER — CAPECITABINE 500 MG/1
2000 TABLET, FILM COATED ORAL 2 TIMES DAILY
Qty: 112 TABLET | Refills: 0 | Status: CANCELLED | OUTPATIENT
Start: 2024-08-05 | End: 2024-08-19

## 2024-08-05 RX ADMIN — Medication 10 ML: at 04:08

## 2024-08-05 RX ADMIN — SODIUM CHLORIDE 1000 ML: 9 INJECTION, SOLUTION INTRAVENOUS at 03:08

## 2024-08-05 RX ADMIN — HEPARIN SODIUM (PORCINE) LOCK FLUSH IV SOLN 100 UNIT/ML 500 UNITS: 100 SOLUTION at 04:08

## 2024-08-06 DIAGNOSIS — C78.7 METASTASIS TO LIVER: ICD-10-CM

## 2024-08-06 DIAGNOSIS — C18.9 COLON ADENOCARCINOMA: Primary | ICD-10-CM

## 2024-08-06 RX ORDER — CAPECITABINE 500 MG/1
1000 TABLET, FILM COATED ORAL 2 TIMES DAILY
Qty: 168 TABLET | Refills: 0 | Status: SHIPPED | OUTPATIENT
Start: 2024-08-11 | End: 2024-08-06 | Stop reason: SDUPTHER

## 2024-08-06 RX ORDER — CAPECITABINE 500 MG/1
1000 TABLET, FILM COATED ORAL 2 TIMES DAILY
Qty: 168 TABLET | Refills: 0 | Status: ACTIVE | OUTPATIENT
Start: 2024-08-11 | End: 2024-08-08

## 2024-08-06 RX ORDER — OLANZAPINE 5 MG/1
5 TABLET ORAL NIGHTLY
Qty: 3 TABLET | Refills: 5 | Status: SHIPPED | OUTPATIENT
Start: 2024-08-11

## 2024-08-06 RX ORDER — PROCHLORPERAZINE MALEATE 5 MG
10 TABLET ORAL 4 TIMES DAILY PRN
Qty: 20 TABLET | Refills: 5 | Status: SHIPPED | OUTPATIENT
Start: 2024-08-11

## 2024-08-08 DIAGNOSIS — C18.9 COLON ADENOCARCINOMA: Primary | ICD-10-CM

## 2024-08-08 DIAGNOSIS — C78.7 METASTASIS TO LIVER: ICD-10-CM

## 2024-08-08 RX ORDER — CAPECITABINE 500 MG/1
1500 TABLET, FILM COATED ORAL 2 TIMES DAILY
Qty: 84 TABLET | Refills: 0 | Status: SHIPPED | OUTPATIENT
Start: 2024-08-11 | End: 2024-08-25

## 2024-08-12 ENCOUNTER — LAB VISIT (OUTPATIENT)
Dept: LAB | Facility: HOSPITAL | Age: 54
End: 2024-08-12
Attending: STUDENT IN AN ORGANIZED HEALTH CARE EDUCATION/TRAINING PROGRAM
Payer: MEDICARE

## 2024-08-12 ENCOUNTER — INFUSION (OUTPATIENT)
Dept: INFUSION THERAPY | Facility: HOSPITAL | Age: 54
End: 2024-08-12
Attending: STUDENT IN AN ORGANIZED HEALTH CARE EDUCATION/TRAINING PROGRAM
Payer: MEDICARE

## 2024-08-12 ENCOUNTER — OFFICE VISIT (OUTPATIENT)
Dept: HEMATOLOGY/ONCOLOGY | Facility: CLINIC | Age: 54
End: 2024-08-12
Payer: MEDICARE

## 2024-08-12 VITALS
SYSTOLIC BLOOD PRESSURE: 94 MMHG | DIASTOLIC BLOOD PRESSURE: 64 MMHG | OXYGEN SATURATION: 100 % | HEART RATE: 77 BPM | WEIGHT: 150.5 LBS | BODY MASS INDEX: 21.55 KG/M2 | TEMPERATURE: 98 F | HEIGHT: 70 IN | RESPIRATION RATE: 20 BRPM

## 2024-08-12 VITALS
SYSTOLIC BLOOD PRESSURE: 94 MMHG | OXYGEN SATURATION: 100 % | TEMPERATURE: 98 F | HEART RATE: 77 BPM | BODY MASS INDEX: 21.55 KG/M2 | DIASTOLIC BLOOD PRESSURE: 64 MMHG | WEIGHT: 150.5 LBS | HEIGHT: 70 IN | RESPIRATION RATE: 20 BRPM

## 2024-08-12 DIAGNOSIS — C18.9 COLON ADENOCARCINOMA: Primary | ICD-10-CM

## 2024-08-12 DIAGNOSIS — C18.9 METASTATIC COLON CANCER TO LIVER: ICD-10-CM

## 2024-08-12 DIAGNOSIS — C78.00 MALIGNANT NEOPLASM METASTATIC TO LUNG, UNSPECIFIED LATERALITY: ICD-10-CM

## 2024-08-12 DIAGNOSIS — C78.7 METASTASIS TO LIVER: ICD-10-CM

## 2024-08-12 DIAGNOSIS — C78.7 METASTATIC COLON CANCER TO LIVER: ICD-10-CM

## 2024-08-12 LAB
ALBUMIN SERPL-MCNC: 3.1 G/DL (ref 3.5–5)
ALBUMIN/GLOB SERPL: 0.8 RATIO (ref 1.1–2)
ALP SERPL-CCNC: 887 UNIT/L (ref 40–150)
ALT SERPL-CCNC: 12 UNIT/L (ref 0–55)
ANION GAP SERPL CALC-SCNC: 7 MEQ/L
AST SERPL-CCNC: 21 UNIT/L (ref 5–34)
BASOPHILS # BLD AUTO: 0.03 X10(3)/MCL
BASOPHILS NFR BLD AUTO: 0.5 %
BILIRUB SERPL-MCNC: 0.4 MG/DL
BUN SERPL-MCNC: 15 MG/DL (ref 9.8–20.1)
CALCIUM SERPL-MCNC: 9.6 MG/DL (ref 8.4–10.2)
CHLORIDE SERPL-SCNC: 105 MMOL/L (ref 98–107)
CO2 SERPL-SCNC: 28 MMOL/L (ref 22–29)
CREAT SERPL-MCNC: 0.98 MG/DL (ref 0.55–1.02)
CREAT/UREA NIT SERPL: 15
EOSINOPHIL # BLD AUTO: 0.03 X10(3)/MCL (ref 0–0.9)
EOSINOPHIL NFR BLD AUTO: 0.5 %
ERYTHROCYTE [DISTWIDTH] IN BLOOD BY AUTOMATED COUNT: 18 % (ref 11.5–17)
GFR SERPLBLD CREATININE-BSD FMLA CKD-EPI: >60 ML/MIN/1.73/M2
GLOBULIN SER-MCNC: 4.1 GM/DL (ref 2.4–3.5)
GLUCOSE SERPL-MCNC: 160 MG/DL (ref 74–100)
HCT VFR BLD AUTO: 34.8 % (ref 37–47)
HGB BLD-MCNC: 10.6 G/DL (ref 12–16)
IMM GRANULOCYTES # BLD AUTO: 0.02 X10(3)/MCL (ref 0–0.04)
IMM GRANULOCYTES NFR BLD AUTO: 0.3 %
LYMPHOCYTES # BLD AUTO: 1.42 X10(3)/MCL (ref 0.6–4.6)
LYMPHOCYTES NFR BLD AUTO: 21.8 %
MCH RBC QN AUTO: 25 PG (ref 27–31)
MCHC RBC AUTO-ENTMCNC: 30.5 G/DL (ref 33–36)
MCV RBC AUTO: 82.1 FL (ref 80–94)
MONOCYTES # BLD AUTO: 0.65 X10(3)/MCL (ref 0.1–1.3)
MONOCYTES NFR BLD AUTO: 10 %
NEUTROPHILS # BLD AUTO: 4.35 X10(3)/MCL (ref 2.1–9.2)
NEUTROPHILS NFR BLD AUTO: 66.9 %
PLATELET # BLD AUTO: 301 X10(3)/MCL (ref 130–400)
PMV BLD AUTO: 8.8 FL (ref 7.4–10.4)
POTASSIUM SERPL-SCNC: 3.8 MMOL/L (ref 3.5–5.1)
PROT SERPL-MCNC: 7.2 GM/DL (ref 6.4–8.3)
RBC # BLD AUTO: 4.24 X10(6)/MCL (ref 4.2–5.4)
SODIUM SERPL-SCNC: 140 MMOL/L (ref 136–145)
WBC # BLD AUTO: 6.5 X10(3)/MCL (ref 4.5–11.5)

## 2024-08-12 PROCEDURE — 3074F SYST BP LT 130 MM HG: CPT | Mod: CPTII,S$GLB,,

## 2024-08-12 PROCEDURE — A4216 STERILE WATER/SALINE, 10 ML: HCPCS | Performed by: STUDENT IN AN ORGANIZED HEALTH CARE EDUCATION/TRAINING PROGRAM

## 2024-08-12 PROCEDURE — 3008F BODY MASS INDEX DOCD: CPT | Mod: CPTII,S$GLB,,

## 2024-08-12 PROCEDURE — 36415 COLL VENOUS BLD VENIPUNCTURE: CPT

## 2024-08-12 PROCEDURE — 96415 CHEMO IV INFUSION ADDL HR: CPT

## 2024-08-12 PROCEDURE — 85025 COMPLETE CBC W/AUTO DIFF WBC: CPT

## 2024-08-12 PROCEDURE — 96413 CHEMO IV INFUSION 1 HR: CPT

## 2024-08-12 PROCEDURE — 63600175 PHARM REV CODE 636 W HCPCS: Performed by: STUDENT IN AN ORGANIZED HEALTH CARE EDUCATION/TRAINING PROGRAM

## 2024-08-12 PROCEDURE — 99215 OFFICE O/P EST HI 40 MIN: CPT | Mod: S$GLB,,,

## 2024-08-12 PROCEDURE — 99999 PR PBB SHADOW E&M-EST. PATIENT-LVL IV: CPT | Mod: PBBFAC,,,

## 2024-08-12 PROCEDURE — 96367 TX/PROPH/DG ADDL SEQ IV INF: CPT

## 2024-08-12 PROCEDURE — 3078F DIAST BP <80 MM HG: CPT | Mod: CPTII,S$GLB,,

## 2024-08-12 PROCEDURE — 80053 COMPREHEN METABOLIC PANEL: CPT

## 2024-08-12 PROCEDURE — 25000003 PHARM REV CODE 250: Performed by: STUDENT IN AN ORGANIZED HEALTH CARE EDUCATION/TRAINING PROGRAM

## 2024-08-12 PROCEDURE — 1160F RVW MEDS BY RX/DR IN RCRD: CPT | Mod: CPTII,S$GLB,,

## 2024-08-12 PROCEDURE — 1159F MED LIST DOCD IN RCRD: CPT | Mod: CPTII,S$GLB,,

## 2024-08-12 RX ORDER — SODIUM CHLORIDE 0.9 % (FLUSH) 0.9 %
10 SYRINGE (ML) INJECTION
Status: DISCONTINUED | OUTPATIENT
Start: 2024-08-12 | End: 2024-08-12 | Stop reason: HOSPADM

## 2024-08-12 RX ORDER — SODIUM CHLORIDE 0.9 % (FLUSH) 0.9 %
10 SYRINGE (ML) INJECTION
Status: CANCELLED | OUTPATIENT
Start: 2024-08-12

## 2024-08-12 RX ORDER — HEPARIN 100 UNIT/ML
500 SYRINGE INTRAVENOUS
Status: CANCELLED | OUTPATIENT
Start: 2024-08-12

## 2024-08-12 RX ORDER — PROCHLORPERAZINE EDISYLATE 5 MG/ML
5 INJECTION INTRAMUSCULAR; INTRAVENOUS ONCE AS NEEDED
Status: CANCELLED
Start: 2024-08-12

## 2024-08-12 RX ORDER — HEPARIN 100 UNIT/ML
500 SYRINGE INTRAVENOUS
Status: DISCONTINUED | OUTPATIENT
Start: 2024-08-12 | End: 2024-08-12 | Stop reason: HOSPADM

## 2024-08-12 RX ORDER — DIPHENHYDRAMINE HYDROCHLORIDE 50 MG/ML
50 INJECTION INTRAMUSCULAR; INTRAVENOUS ONCE AS NEEDED
Status: CANCELLED | OUTPATIENT
Start: 2024-08-12

## 2024-08-12 RX ORDER — EPINEPHRINE 0.3 MG/.3ML
0.3 INJECTION SUBCUTANEOUS ONCE AS NEEDED
Status: CANCELLED | OUTPATIENT
Start: 2024-08-12

## 2024-08-12 RX ADMIN — OXALIPLATIN 237 MG: 5 INJECTION, SOLUTION INTRAVENOUS at 10:08

## 2024-08-12 RX ADMIN — HEPARIN SODIUM (PORCINE) LOCK FLUSH IV SOLN 100 UNIT/ML 500 UNITS: 100 SOLUTION at 01:08

## 2024-08-12 RX ADMIN — DEXTROSE MONOHYDRATE: 50 INJECTION, SOLUTION INTRAVENOUS at 10:08

## 2024-08-12 RX ADMIN — Medication 10 ML: at 01:08

## 2024-08-12 RX ADMIN — PALONOSETRON HYDROCHLORIDE 0.25 MG: 0.25 INJECTION INTRAVENOUS at 10:08

## 2024-08-12 NOTE — PROGRESS NOTES
Subjective:       Patient ID: Shira Nair is a 54 y.o. female.    Chief Complaint: New Patient    Diagnosis: Metastatic Colon Adenocarcinoma - mets to liver and lung    Current Treatment: FOLFOX q2w x 12 cycles-- to start 7/29/24    Treatment History: N/A    HPI  53yo F presented in June '24 for evaluation of liver metastases. She presented to OSH in May '24 with 1 month of abdominal pain. CT A/P at that time revealed numerous hepatic lesions, largest measuring 5.8 x 7cm, concerning for metastatic disease. CT Chest revealed numerous new bilateral pulmonary nodules measuring 3-4mm in size of indeterminate etiology. IR liver biopsy was done which was completely necrotic. An egd and colonoscopy done with Dr. Jones in June '24 revealed a severely stenotic malignant lesion in the mid descending colon that was unable to be traversed. Biopsy was taken which revealed at least high grade dysplasia suspicious for adenocarcinoma. Her CEA at time of imaging/workup was 546. She has a history of cervical cancer about 15 years ago, she had a total hysterectomy for this. She has a history of a benign brain tumor that required  shunt placed in the 1980s. She also had benign thyroid tumors removed about 8 years prior to presentation.     She underwent mediport placement and liver biopsy with ex lap with Dr. Cast on 7/18/24. Liver biopsy returned positive for metastatic colorectal adenocarcinoma. Will plan to proceed with 1st line FOLFOX with 3rd agent pending NGS studies.     Interval History:  She returns to the clinic today for C1 Xelox, accompanied by her sister. She did get C1 FOLFOX on 7/29/2024, reported that her dog chewed through 5FU pump cord.  She feels well today and feels that she tolerated treatment well. Due her history of drug use, a drug screen was performed which was positive for fentanyl. Results were discussed in detail with her. Results discussed with Dr. LeJeune and decision was made to change  form FOLFOX to Xelox. She denies any nausea, vomiting, diarrhea, constipation, SOB, CP, headache, or swelling. Instructed patient on how to take xeloda and side effects, verbalized understanding.         Past Medical History:   Diagnosis Date    Brain tumor     Cervical ca     Depression     Malignant neoplasm of colon, unspecified     Opioid abuse       Past Surgical History:   Procedure Laterality Date    BRAIN SURGERY      CHOLECYSTECTOMY      COLONOSCOPY N/A 06/06/2024    Procedure: COLON;  Surgeon: Kevin Causey MD;  Location: [facility] ENDOSCOPY;  Service: Gastroenterology;  Laterality: N/A;    COLONOSCOPY, WITH 1 OR MORE BIOPSIES  06/06/2024    Procedure: COLONOSCOPY, WITH 1 OR MORE BIOPSIES;  Surgeon: Kevin Causey MD;  Location: [facility] ENDOSCOPY;  Service: Gastroenterology;;    COLONOSCOPY, WITH DIRECTED SUBMUCOSAL INJECTION  06/06/2024    Procedure: COLONOSCOPY, WITH DIRECTED SUBMUCOSAL INJECTION;  Surgeon: Kevin Causey MD;  Location: [facility] ENDOSCOPY;  Service: Gastroenterology;;    DIAGNOSTIC LAPAROSCOPY N/A 7/18/2024    Procedure: LAPAROSCOPY, DIAGNOSTIC;  Surgeon: Rubio Cast MD;  Location: [facility];  Service: General;  Laterality: N/A;  WITH LAP LIVER BIOPSY - ULTRASOUND GUIDED    EGD, WITH CLOSED BIOPSY  06/06/2024    Procedure: EGD, WITH CLOSED BIOPSY;  Surgeon: Kevin Causey MD;  Location: [facility] ENDOSCOPY;  Service: Gastroenterology;;    ESOPHAGOGASTRODUODENOSCOPY N/A 06/06/2024    Procedure: DOUBLE;  Surgeon: Kevin Causey MD;  Location: [facility] ENDOSCOPY;  Service: Gastroenterology;  Laterality: N/A;    HYSTERECTOMY      INSERTION OF TUNNELED CENTRAL VENOUS CATHETER (CVC) WITH SUBCUTANEOUS PORT N/A 7/18/2024    Procedure: [id number];  Surgeon: Rubio Cast MD;  Location: [facility];  Service: General;  Laterality: N/A;    KNEE SURGERY      THYROID LOBECTOMY       Social History     Socioeconomic History    Marital  status: Single   Tobacco Use    Smoking status: Never     Passive exposure: Never    Smokeless tobacco: Never   Substance and Sexual Activity    Alcohol use: Never    Drug use: Not Currently     Types: Cocaine     Comment: 5-6 YEARS QUIT ON SUBOXONE      Family History   Problem Relation Name Age of Onset    Hypertension Mother      Hypertension Father      Stroke Father      Pancreatic cancer Brother      Cancer Paternal Grandfather        Review of patient's allergies indicates:   Allergen Reactions    Ketorolac Hives    Tramadol Hives    Vancomycin analogues Hives      Review of Systems   Constitutional:  Negative for activity change, fatigue, fever and unexpected weight change.   HENT:  Negative for sore throat.    Eyes:  Negative for visual disturbance.   Respiratory:  Negative for cough and shortness of breath.    Cardiovascular:  Negative for chest pain.   Gastrointestinal:  Negative for abdominal pain, diarrhea, nausea and vomiting.   Endocrine: Negative for polyuria.   Genitourinary:  Negative for dysuria and hot flashes.   Integumentary:  Negative for rash.   Allergic/Immunologic: Negative for immunocompromised state.   Neurological:  Negative for weakness and headaches.   Hematological:  Negative for adenopathy.   Psychiatric/Behavioral:  Negative for confusion.          Objective:      Vitals:    08/12/24 0919   BP: 94/64   Pulse: 77   Resp: 20   Temp: 98.1 °F (36.7 °C)         Physical Exam  Constitutional:       General: She is not in acute distress.     Appearance: Normal appearance. She is not ill-appearing.   HENT:      Head: Normocephalic and atraumatic.      Nose: Nose normal.      Mouth/Throat:      Mouth: Mucous membranes are moist.      Pharynx: Oropharynx is clear.   Eyes:      Extraocular Movements: Extraocular movements intact.      Conjunctiva/sclera: Conjunctivae normal.      Pupils: Pupils are equal, round, and reactive to light.   Cardiovascular:      Rate and Rhythm: Normal rate and  regular rhythm.      Pulses: Normal pulses.      Heart sounds: Normal heart sounds. No murmur heard.  Pulmonary:      Effort: Pulmonary effort is normal. No respiratory distress.      Breath sounds: Normal breath sounds.   Abdominal:      General: There is no distension.      Palpations: Abdomen is soft.      Tenderness: There is no abdominal tenderness.   Musculoskeletal:         General: Normal range of motion.      Cervical back: Normal range of motion and neck supple.      Right lower leg: No edema.      Left lower leg: No edema.   Lymphadenopathy:      Cervical: No cervical adenopathy.   Skin:     General: Skin is warm and dry.   Neurological:      General: No focal deficit present.      Mental Status: She is alert and oriented to person, place, and time.         LABS AND IMAGING REVIEWED IN EPIC  1/3/24 CT Abd/Pelvis:  1. Postoperative changes are present compatible with a previous cholecystectomy. An elliptical calcification is noted within the gallbladder fossa and I suspect represents chronic calcification of a postoperative hematoma.  Clinical correlation is recommended.  2. Left-sided, nonobstructing nephrolithiasis as described above.  3. Chronic changes are present as described above.  See above comments.  5/13/24 CT Abd/Pelvis:  Changes most consistent with metastatic disease to the liver.   5/14/24 CT Chest:  Small indeterminate pulmonary nodules which were not seen in 2017. These can be followed on surveillance scans.   6/27/24 PET/CT:  1. Hepatic extensive metastatic involvement with hypermetabolic masses.  2. Descending colon concentric mural thickening and hypermetabolism is suggested to be the focus of colon carcinoma.  7/23/24 CT Abd/Pelvis:  1. A few subcentimeter non- calcified nodules are seen in the anterior and lateral middle lobe as well as posterior lower lobe, some of which are seen stable in the prior study. This are of concern for metastasis.   2. Multiple hypoenhancing lesions are  again seen in both hepatic lobes with subtle decreased in size in the aggregate of lesions in the anterior right hepatic lobe measuring 5.9 x 5 cm (AP x T) on series 3 image 29. These are ascribed to metastatic lesions.   3. There is stable 6 cm long annular wall thickening along the descending colon centered on series 6 image 32. This is consistent with the known primary colon malignancy.   4. Details and other findings as discussed above.      Pathology:  6/6/24 EGD/Colonoscopy Biopsy:  1. STOMACH BIOPSY:    -MODERATE CHRONIC GASTRITIS, FOCALLY ACTIVE (SEE COMMENT).    -NO EVIDENCE OF DYSPLASIA OR MALIGNANCY.   2. COLON STRICTURE BIOPSY:    -FRAGMENTS OF COLONIC-TYPE MUCOSA WITH AT LEAST HIGH GRADE DYSPLASIA, SUSPICIOUS   FOR ADENOCARCINOMA (SEE COMMENTS).   7/18/24 Laparoscopy:  1. PERITONEUM, BIOPSY:    -MESOTHELIUM-LINED MEMBRANOUS FIBROADIPOSE TISSUE. NO NEOPLASM.   2. LEFT LOBE OF LIVER, WEDGE BIOPSY:    -METASTATIC COLORECTAL ADENOCARCINOMA.     Assessment:   Metastatic Colorectal Adenocarcinoma - mets to liver, extensive and unresectable.   PET/CT 6/27/24 revealed hepatic extensive metastatic involvement with hypermetabolic masses and descending colon  Per Dr. Cast no role for diversion at this time given no symptoms. Colon cancer of the descending colon with unresectable liver metastasis, relatively bulky disease within the liver.   Laparoscopy and mediport placement performed 7/18/24, liver with metastatic colorectal adenocarcinoma, NGS pending  Plan for FOLFOX q2w x 12 cycles, to start 7/29  Dog chewed through pump cord for C1, drug screen positive for fentynal and opiates. Will switch to xelox at this time.         Plan:   Labs and exam stable. Proceed with C1 Xelox  Xeloda 1500mg BID days 1-14/21 with oxali q3w.    Referral to pain management sent.  RTC in 3 weeks with NP for FU/lab   Cbc, cmp, cea- 1 hr prior @ [facility]    I spent a total of 40 minutes on the day of the visit.This includes face to face  time and non-face to face time preparing to see the patient (eg, review of tests), obtaining and/or reviewing separately obtained history, documenting clinical information in the electronic or other health record, independently interpreting results and communicating results to the patient/family/caregiver, or care coordinator.        Nicolasa Presley, FNP-C  Oncology/Hematology  [facility]

## 2024-08-15 DIAGNOSIS — C18.9 COLON ADENOCARCINOMA: Primary | ICD-10-CM

## 2024-08-15 DIAGNOSIS — C78.7 METASTASIS TO LIVER: ICD-10-CM

## 2024-08-15 RX ORDER — CAPECITABINE 500 MG/1
1500 TABLET, FILM COATED ORAL 2 TIMES DAILY
Qty: 84 TABLET | Refills: 0 | Status: ACTIVE | OUTPATIENT
Start: 2024-08-15 | End: 2024-08-29

## 2024-09-01 ENCOUNTER — HOSPITAL ENCOUNTER (EMERGENCY)
Facility: HOSPITAL | Age: 54
Discharge: HOME | End: 2024-09-01
Attending: FAMILY MEDICINE
Payer: MEDICARE

## 2024-09-01 VITALS
TEMPERATURE: 98 F | BODY MASS INDEX: 21.33 KG/M2 | SYSTOLIC BLOOD PRESSURE: 137 MMHG | OXYGEN SATURATION: 98 % | HEIGHT: 70 IN | HEART RATE: 90 BPM | DIASTOLIC BLOOD PRESSURE: 98 MMHG | RESPIRATION RATE: 20 BRPM | WEIGHT: 149 LBS

## 2024-09-01 DIAGNOSIS — E87.6 HYPOKALEMIA: ICD-10-CM

## 2024-09-01 DIAGNOSIS — R10.9 ABDOMINAL PAIN, UNSPECIFIED ABDOMINAL LOCATION: ICD-10-CM

## 2024-09-01 DIAGNOSIS — K57.92 DIVERTICULITIS: Primary | ICD-10-CM

## 2024-09-01 DIAGNOSIS — M54.50 ACUTE LOW BACK PAIN, UNSPECIFIED BACK PAIN LATERALITY, UNSPECIFIED WHETHER SCIATICA PRESENT: ICD-10-CM

## 2024-09-01 LAB
ALBUMIN SERPL-MCNC: 4.2 G/DL (ref 3.4–5)
ALBUMIN/GLOB SERPL: 1.3 RATIO
ALP SERPL-CCNC: 1080 UNIT/L (ref 50–144)
ALT SERPL-CCNC: 24 UNIT/L (ref 1–45)
ANION GAP SERPL CALC-SCNC: 13 MEQ/L (ref 2–13)
ANISOCYTOSIS BLD QL SMEAR: ABNORMAL
AST SERPL-CCNC: 50 UNIT/L (ref 14–36)
BASOPHILS # BLD AUTO: 0.03 X10(3)/MCL (ref 0.01–0.08)
BASOPHILS NFR BLD AUTO: 0.3 % (ref 0.1–1.2)
BILIRUB SERPL-MCNC: 0.3 MG/DL (ref 0–1)
BILIRUB UR QL STRIP.AUTO: NEGATIVE
BUN SERPL-MCNC: 12 MG/DL (ref 7–20)
CALCIUM SERPL-MCNC: 10 MG/DL (ref 8.4–10.2)
CHLORIDE SERPL-SCNC: 107 MMOL/L (ref 98–110)
CLARITY UR: CLEAR
CO2 SERPL-SCNC: 23 MMOL/L (ref 21–32)
COLOR UR AUTO: YELLOW
CREAT SERPL-MCNC: 0.66 MG/DL (ref 0.66–1.25)
CREAT/UREA NIT SERPL: 18 (ref 12–20)
EOSINOPHIL # BLD AUTO: 0 X10(3)/MCL (ref 0.04–0.36)
EOSINOPHIL NFR BLD AUTO: 0 % (ref 0.7–7)
ERYTHROCYTE [DISTWIDTH] IN BLOOD BY AUTOMATED COUNT: 23.2 % (ref 11–14.5)
GFR SERPLBLD CREATININE-BSD FMLA CKD-EPI: >90 ML/MIN/1.73/M2
GLOBULIN SER-MCNC: 3.2 GM/DL (ref 2–3.9)
GLUCOSE SERPL-MCNC: 149 MG/DL (ref 70–115)
GLUCOSE UR QL STRIP: NEGATIVE
HCT VFR BLD AUTO: 36.4 % (ref 36–48)
HGB BLD-MCNC: 12.1 G/DL (ref 11.8–16)
HGB UR QL STRIP: NEGATIVE
IMM GRANULOCYTES # BLD AUTO: 0.04 X10(3)/MCL (ref 0–0.03)
IMM GRANULOCYTES NFR BLD AUTO: 0.4 % (ref 0–0.5)
KETONES UR QL STRIP: NEGATIVE
LEUKOCYTE ESTERASE UR QL STRIP: NEGATIVE
LYMPHOCYTES # BLD AUTO: 1.85 X10(3)/MCL (ref 1.16–3.74)
LYMPHOCYTES NFR BLD AUTO: 18.4 % (ref 20–55)
MACROCYTES BLD QL SMEAR: ABNORMAL
MAGNESIUM SERPL-MCNC: 2 MG/DL (ref 1.8–2.4)
MCH RBC QN AUTO: 26.7 PG (ref 27–34)
MCHC RBC AUTO-ENTMCNC: 33.2 G/DL (ref 31–37)
MCV RBC AUTO: 80.4 FL (ref 79–99)
MICROCYTES BLD QL SMEAR: ABNORMAL
MONOCYTES # BLD AUTO: 0.75 X10(3)/MCL (ref 0.24–0.36)
MONOCYTES NFR BLD AUTO: 7.4 % (ref 4.7–12.5)
NEUTROPHILS # BLD AUTO: 7.41 X10(3)/MCL (ref 1.56–6.13)
NEUTROPHILS NFR BLD AUTO: 73.5 % (ref 37–73)
NITRITE UR QL STRIP: NEGATIVE
NRBC BLD AUTO-RTO: 0 %
PH UR STRIP: 6.5 [PH]
PLATELET # BLD AUTO: 178 X10(3)/MCL (ref 140–371)
PLATELET # BLD EST: ADEQUATE 10*3/UL
PMV BLD AUTO: 9.3 FL (ref 9.4–12.4)
POTASSIUM SERPL-SCNC: 2.7 MMOL/L (ref 3.5–5.1)
PROT SERPL-MCNC: 7.4 GM/DL (ref 6.3–8.2)
PROT UR QL STRIP: NEGATIVE
RBC # BLD AUTO: 4.53 X10(6)/MCL (ref 4–5.1)
RBC MORPH BLD: ABNORMAL
SODIUM SERPL-SCNC: 143 MMOL/L (ref 136–145)
SP GR UR STRIP.AUTO: 1.02 (ref 1–1.03)
UROBILINOGEN UR STRIP-ACNC: 1
WBC # BLD AUTO: 10.08 X10(3)/MCL (ref 4–11.5)

## 2024-09-01 PROCEDURE — 85025 COMPLETE CBC W/AUTO DIFF WBC: CPT | Performed by: FAMILY MEDICINE

## 2024-09-01 PROCEDURE — 96374 THER/PROPH/DIAG INJ IV PUSH: CPT

## 2024-09-01 PROCEDURE — 81003 URINALYSIS AUTO W/O SCOPE: CPT | Performed by: FAMILY MEDICINE

## 2024-09-01 PROCEDURE — 80053 COMPREHEN METABOLIC PANEL: CPT | Performed by: FAMILY MEDICINE

## 2024-09-01 PROCEDURE — 83735 ASSAY OF MAGNESIUM: CPT | Performed by: FAMILY MEDICINE

## 2024-09-01 PROCEDURE — 25000003 PHARM REV CODE 250: Performed by: FAMILY MEDICINE

## 2024-09-01 PROCEDURE — 96376 TX/PRO/DX INJ SAME DRUG ADON: CPT

## 2024-09-01 PROCEDURE — 96375 TX/PRO/DX INJ NEW DRUG ADDON: CPT

## 2024-09-01 PROCEDURE — 99285 EMERGENCY DEPT VISIT HI MDM: CPT | Mod: 25

## 2024-09-01 PROCEDURE — 63600175 PHARM REV CODE 636 W HCPCS: Performed by: FAMILY MEDICINE

## 2024-09-01 PROCEDURE — 96361 HYDRATE IV INFUSION ADD-ON: CPT

## 2024-09-01 RX ORDER — LEVOFLOXACIN 500 MG/1
500 TABLET, FILM COATED ORAL DAILY
Status: DISCONTINUED | OUTPATIENT
Start: 2024-09-01 | End: 2024-09-01 | Stop reason: HOSPADM

## 2024-09-01 RX ORDER — ACETAMINOPHEN 500 MG
1000 TABLET ORAL
Status: DISCONTINUED | OUTPATIENT
Start: 2024-09-01 | End: 2024-09-01 | Stop reason: HOSPADM

## 2024-09-01 RX ORDER — HYDROMORPHONE HYDROCHLORIDE 1 MG/ML
0.5 INJECTION, SOLUTION INTRAMUSCULAR; INTRAVENOUS; SUBCUTANEOUS
Status: COMPLETED | OUTPATIENT
Start: 2024-09-01 | End: 2024-09-01

## 2024-09-01 RX ORDER — METRONIDAZOLE 250 MG/1
500 TABLET ORAL
Status: COMPLETED | OUTPATIENT
Start: 2024-09-01 | End: 2024-09-01

## 2024-09-01 RX ORDER — OXYCODONE AND ACETAMINOPHEN 5; 325 MG/1; MG/1
1 TABLET ORAL EVERY 4 HOURS PRN
Qty: 10 TABLET | Refills: 0 | Status: SHIPPED | OUTPATIENT
Start: 2024-09-01

## 2024-09-01 RX ORDER — LEVOFLOXACIN 500 MG/1
500 TABLET, FILM COATED ORAL DAILY
Qty: 7 TABLET | Refills: 0 | Status: SHIPPED | OUTPATIENT
Start: 2024-09-01 | End: 2024-09-08

## 2024-09-01 RX ORDER — POTASSIUM CHLORIDE 20 MEQ/1
40 TABLET, EXTENDED RELEASE ORAL
Status: COMPLETED | OUTPATIENT
Start: 2024-09-01 | End: 2024-09-01

## 2024-09-01 RX ORDER — METOCLOPRAMIDE HYDROCHLORIDE 5 MG/ML
10 INJECTION INTRAMUSCULAR; INTRAVENOUS
Status: COMPLETED | OUTPATIENT
Start: 2024-09-01 | End: 2024-09-01

## 2024-09-01 RX ADMIN — LEVOFLOXACIN 500 MG: 500 TABLET, FILM COATED ORAL at 11:09

## 2024-09-01 RX ADMIN — HYDROMORPHONE HYDROCHLORIDE 0.5 MG: 1 INJECTION, SOLUTION INTRAMUSCULAR; INTRAVENOUS; SUBCUTANEOUS at 10:09

## 2024-09-01 RX ADMIN — SODIUM CHLORIDE 1000 ML: 9 INJECTION, SOLUTION INTRAVENOUS at 10:09

## 2024-09-01 RX ADMIN — METOCLOPRAMIDE HYDROCHLORIDE 10 MG: 5 INJECTION INTRAMUSCULAR; INTRAVENOUS at 10:09

## 2024-09-01 RX ADMIN — METRONIDAZOLE 500 MG: 250 TABLET ORAL at 11:09

## 2024-09-01 RX ADMIN — HYDROMORPHONE HYDROCHLORIDE 0.5 MG: 1 INJECTION, SOLUTION INTRAMUSCULAR; INTRAVENOUS; SUBCUTANEOUS at 11:09

## 2024-09-01 RX ADMIN — POTASSIUM CHLORIDE 40 MEQ: 1500 TABLET, EXTENDED RELEASE ORAL at 11:09

## 2024-09-01 NOTE — ED PROVIDER NOTES
Encounter Date: 9/1/2024       History     Chief Complaint   Patient presents with    Back Pain     PT C/O lower back and abdominal pan onset this am approx 0400hrs. PT is taking chemo pill PO.      Patient presents for evaluation of right-sided pelvic pain and back pain.  Patient has a history of colon cancer.  Patient notes having back pain and pelvic pain for the past couple of days.  Pain is aching moderate pain that has been constant since onset.  Patient denies nausea vomiting constipation any other associated symptoms.  Patient denies dysuria hematuria hematochezia melena.    The history is provided by the patient.     Review of patient's allergies indicates:   Allergen Reactions    Ketorolac Hives    Tramadol Hives    Vancomycin analogues Hives     Past Medical History:   Diagnosis Date    Brain tumor     Cervical ca     Depression     Malignant neoplasm of colon, unspecified     Opioid abuse      Past Surgical History:   Procedure Laterality Date    BRAIN SURGERY      CHOLECYSTECTOMY      COLONOSCOPY N/A 06/06/2024    Procedure: COLON;  Surgeon: Kevin Causey MD;  Location: [facility] ENDOSCOPY;  Service: Gastroenterology;  Laterality: N/A;    COLONOSCOPY, WITH 1 OR MORE BIOPSIES  06/06/2024    Procedure: COLONOSCOPY, WITH 1 OR MORE BIOPSIES;  Surgeon: Kevin Causey MD;  Location: [facility] ENDOSCOPY;  Service: Gastroenterology;;    COLONOSCOPY, WITH DIRECTED SUBMUCOSAL INJECTION  06/06/2024    Procedure: COLONOSCOPY, WITH DIRECTED SUBMUCOSAL INJECTION;  Surgeon: Kevin Causey MD;  Location: [facility] ENDOSCOPY;  Service: Gastroenterology;;    DIAGNOSTIC LAPAROSCOPY N/A 7/18/2024    Procedure: LAPAROSCOPY, DIAGNOSTIC;  Surgeon: Rubio Cast MD;  Location: [facility] OR;  Service: General;  Laterality: N/A;  WITH LAP LIVER BIOPSY - ULTRASOUND GUIDED    EGD, WITH CLOSED BIOPSY  06/06/2024    Procedure: EGD, WITH CLOSED BIOPSY;  Surgeon: Kevin Causey MD;  Location: [facility]  [facility] ENDOSCOPY;  Service: Gastroenterology;;    ESOPHAGOGASTRODUODENOSCOPY N/A 06/06/2024    Procedure: DOUBLE;  Surgeon: Kevin Causey MD;  Location: [facility] ENDOSCOPY;  Service: Gastroenterology;  Laterality: N/A;    HYSTERECTOMY      INSERTION OF TUNNELED CENTRAL VENOUS CATHETER (CVC) WITH SUBCUTANEOUS PORT N/A 7/18/2024    Procedure: [id number]-[id number];  Surgeon: Rubio Cast MD;  Location: [facility] OR;  Service: General;  Laterality: N/A;    KNEE SURGERY      THYROID LOBECTOMY       Family History   Problem Relation Name Age of Onset    Hypertension Mother      Hypertension Father      Stroke Father      Pancreatic cancer Brother      Cancer Paternal Grandfather       Social History     Tobacco Use    Smoking status: Never     Passive exposure: Never    Smokeless tobacco: Never   Substance Use Topics    Alcohol use: Never    Drug use: Not Currently     Types: Cocaine     Comment: 5-6 YEARS QUIT ON SUBOXONE     Review of Systems   Constitutional: Negative.    HENT: Negative.     Eyes: Negative.    Respiratory: Negative.     Cardiovascular: Negative.    Gastrointestinal:  Positive for abdominal pain.   Endocrine: Negative.    Genitourinary:  Positive for pelvic pain.   Musculoskeletal: Negative.    Skin: Negative.    Allergic/Immunologic: Negative.    Neurological: Negative.    Hematological: Negative.    Psychiatric/Behavioral: Negative.         Physical Exam     Initial Vitals [09/01/24 0941]   BP Pulse Resp Temp SpO2   (!) 169/108 97 18 98 °F (36.7 °C) 97 %      MAP       --         Physical Exam    Vitals reviewed.  Constitutional: She appears well-developed and well-nourished. She is not diaphoretic. No distress.   HENT:   Head: Normocephalic and atraumatic.   Mouth/Throat: No oropharyngeal exudate.   Eyes: EOM are normal. Pupils are equal, round, and reactive to light. Right eye exhibits no discharge. Left eye exhibits no discharge. No scleral icterus.   Neck: Neck supple. No thyromegaly  present. No tracheal deviation present. No JVD present.   Normal range of motion.  Cardiovascular:  Normal rate, regular rhythm and normal heart sounds.     Exam reveals no gallop and no friction rub.       No murmur heard.  Pulmonary/Chest: Breath sounds normal. No stridor. No respiratory distress. She has no wheezes. She has no rhonchi. She has no rales.   Abdominal: Abdomen is soft. Bowel sounds are normal. She exhibits no distension. There is no abdominal tenderness. There is no rebound and no guarding.   Musculoskeletal:         General: No tenderness or edema. Normal range of motion.      Cervical back: Normal range of motion and neck supple.     Neurological: She is alert and oriented to person, place, and time. She has normal reflexes. She displays normal reflexes. No cranial nerve deficit or sensory deficit. GCS score is 15. GCS eye subscore is 4. GCS verbal subscore is 5. GCS motor subscore is 6.   Skin: Skin is warm. No erythema.   Psychiatric: She has a normal mood and affect.         ED Course   Procedures  Labs Reviewed   COMPREHENSIVE METABOLIC PANEL - Abnormal       Result Value    Sodium 143      Potassium 2.7 (*)     Chloride 107      CO2 23      Glucose 149 (*)     Blood Urea Nitrogen 12      Creatinine 0.66      Calcium 10.0      Protein Total 7.4      Albumin 4.2      Globulin 3.2      Albumin/Globulin Ratio 1.3      Bilirubin Total 0.3      ALP 1,080 (*)     ALT 24      AST 50 (*)     eGFR >90      Anion Gap 13.0      BUN/Creatinine Ratio 18     CBC WITH DIFFERENTIAL - Abnormal    WBC 10.08      RBC 4.53      Hgb 12.1      Hct 36.4      MCV 80.4      MCH 26.7 (*)     MCHC 33.2      RDW 23.2 (*)     Platelet 178      MPV 9.3 (*)     Neut % 73.5 (*)     Lymph % 18.4 (*)     Mono % 7.4      Eos % 0.0 (*)     Basophil % 0.3      Lymph # 1.85      Neut # 7.41 (*)     Mono # 0.75 (*)     Eos # 0.00 (*)     Baso # 0.03      IG# 0.04 (*)     IG% 0.4      NRBC% 0.0     MAGNESIUM - Normal    Magnesium  Level 2.00     URINALYSIS, REFLEX TO URINE CULTURE - Normal    Color, UA Yellow      Appearance, UA Clear      Specific Gravity, UA 1.025      pH, UA 6.5      Protein, UA Negative      Glucose, UA Negative      Ketones, UA Negative      Blood, UA Negative      Bilirubin, UA Negative      Urobilinogen, UA 1.0      Nitrites, UA Negative      Leukocyte Esterase, UA Negative      Narrative:      URINE STABILITY IS 2 HOURS AT ROOM TEMP OR    SIX HOURS REFRIGERATED. PERFORMING TESTING ON    SPECIMENS GREATER THAN THIS AGE MAY AFFECT THE    FOLLOWING TESTS:    PH          SPECIFIC GRAVITY           BLOOD    CLARITY     BILIRUBIN               UROBILINOGEN   CBC W/ AUTO DIFFERENTIAL    Narrative:     The following orders were created for panel order CBC auto differential.  Procedure                               Abnormality         Status                     ---------                               -----------         ------                     CBC with Differential[[id number]]       Abnormal            Final result                 Please view results for these tests on the individual orders.   BLOOD SMEAR MICROSCOPIC EXAM ([facility])          Imaging Results              CT Abdomen Pelvis  Without Contrast (Final result)  Result time 09/01/24 10:59:11      Final result by Jose Juan Kapadia MD (09/01/24 10:59:11)                   Impression:      1. Acute descending colonic diverticulitis without gross perforation or abscess formation.  2. Questionable punctate distal right ureteral stone near the ureterovesicular junction without hydronephrosis or hydroureter.  3. Multifocal hypoattenuating liver lesions with similar burden of disease compared to prior CT from 07/29/2024.  4. Unchanged multiple 4-5 mm lung base nodules.  5. Nonobstructing 9 mm left lower pole renal stone.  6. Additional findings as above.      Electronically signed by: Jose Juan Kapadia MD  Date:    09/01/2024  Time:    10:59               Narrative:     EXAMINATION:  CT ABDOMEN PELVIS WITHOUT CONTRAST    CLINICAL HISTORY:  Abdominal pain.    TECHNIQUE:  Low dose axial images, sagittal and coronal reformations were obtained from the lung bases to the pubic symphysis.  IV contrast was not administered.  Oral contrast was not given. Dose reduction techniques including automatic exposure control (AEC) were utilized.    Dose (DLP): 324.8 mGycm    COMPARISON:  CT abdomen pelvis without contrast, 07/29/2024.  CT chest, 05/14/2024.    FINDINGS:  Lung Bases: 5 and 4 mm left lower lobe, lingular and right middle lobe nodules are similar to prior chest CT from 05/14/2024.  (Series 4, image 4, 8, 9).  No consolidation or pleural effusion.    Heart: Normal in size. No pericardial effusion.    Liver: Multiple hypoattenuating lesions throughout the liver with similar burden compared to prior study from 07/29/2024.  Dominant lesion along the gallbladder fossa internal peripherally calcified focus is unchanged    Gallbladder: Status post cholecystectomy.    Bile Ducts: No evidence of dilated ducts.    Pancreas: Fatty atrophy of the pancreas.    Spleen: Unremarkable.    Adrenals: Unremarkable.    Kidneys/ Ureters: Nonobstructing 9 mm left lower pole renal stone (series 3, image 41).  No right renal stone.  No hydronephrosis.  Ureters are normal in course and caliber.  Question punctate stone in the distal right ureter near the ureterovesicular junction (series 3, image 84).    Bladder: No evidence of wall thickening.    Reproductive organs: Status post hysterectomy.    GI Tract/Mesentery: Stomach is normal in appearance.  Small bowel is nondilated.  The appendix is normal.  Scattered colonic diverticula.  Active associated inflammation in the descending colon (series 7, image 59, series 3 image 46).  No gross perforation or abscess formation.    Peritoneal Space: Left-sided  shunt catheter tubing is present.  Catheters terminate in the bilateral abdomen with tips in similar  position on the right and mobile on the left compared to prior study.  No free air.    Retroperitoneum: No significant adenopathy.    Abdominal wall: Unremarkable.    Vasculature: Mild atherosclerotic calcifications.  No aneurysm.    Bones: No acute fracture or aggressive-appearing lytic or blastic lesion.                                       Medications   sodium chloride 0.9% bolus 1,000 mL 1,000 mL (1,000 mLs Intravenous New Bag 9/1/24 1037)   acetaminophen tablet 1,000 mg (1,000 mg Oral Not Given 9/1/24 1015)   levoFLOXacin tablet 500 mg (500 mg Oral Given 9/1/24 1130)   potassium chloride SA CR tablet 40 mEq (has no administration in time range)   HYDROmorphone injection 0.5 mg (0.5 mg Intravenous Given 9/1/24 1039)   metoclopramide injection 10 mg (10 mg Intravenous Given 9/1/24 1037)   HYDROmorphone injection 0.5 mg (0.5 mg Intravenous Given 9/1/24 1130)   metroNIDAZOLE tablet 500 mg (500 mg Oral Given 9/1/24 1129)     Medical Decision Making  Amount and/or Complexity of Data Reviewed  Labs: ordered.  Radiology: ordered.    Risk  OTC drugs.  Prescription drug management.                                      Clinical Impression:  Final diagnoses:  [R10.9] Abdominal pain, unspecified abdominal location  [M54.50] Acute low back pain, unspecified back pain laterality, unspecified whether sciatica present  [K57.92] Diverticulitis (Primary)  [E87.6] Hypokalemia          ED Disposition Condition    Discharge Stable          ED Prescriptions       Medication Sig Dispense Start Date End Date Auth. Provider    levoFLOXacin (LEVAQUIN) 500 MG tablet Take 1 tablet (500 mg total) by mouth once daily. for 7 days 7 tablet 9/1/2024 9/8/2024 Jamal Wooten MD    oxyCODONE-acetaminophen (PERCOCET) 5-325 mg per tablet Take 1 tablet by mouth every 4 (four) hours as needed for Pain. 10 tablet 9/1/2024 -- Jamal Wooten MD          Follow-up Information    None          Jamal Wooten MD  09/01/24 1132       Je  Jamal SANZ MD  09/01/24 1131

## 2024-09-04 ENCOUNTER — LAB VISIT (OUTPATIENT)
Dept: LAB | Facility: HOSPITAL | Age: 54
End: 2024-09-04
Payer: MEDICARE

## 2024-09-04 ENCOUNTER — OFFICE VISIT (OUTPATIENT)
Dept: HEMATOLOGY/ONCOLOGY | Facility: CLINIC | Age: 54
End: 2024-09-04
Payer: MEDICARE

## 2024-09-04 VITALS
BODY MASS INDEX: 21.43 KG/M2 | SYSTOLIC BLOOD PRESSURE: 93 MMHG | HEIGHT: 70 IN | OXYGEN SATURATION: 98 % | TEMPERATURE: 98 F | WEIGHT: 149.69 LBS | DIASTOLIC BLOOD PRESSURE: 64 MMHG | RESPIRATION RATE: 20 BRPM

## 2024-09-04 DIAGNOSIS — C18.9 COLON ADENOCARCINOMA: ICD-10-CM

## 2024-09-04 DIAGNOSIS — C78.00 MALIGNANT NEOPLASM METASTATIC TO LUNG, UNSPECIFIED LATERALITY: ICD-10-CM

## 2024-09-04 DIAGNOSIS — E87.6 HYPOKALEMIA: Primary | ICD-10-CM

## 2024-09-04 DIAGNOSIS — C18.9 METASTATIC COLON CANCER TO LIVER: ICD-10-CM

## 2024-09-04 DIAGNOSIS — C78.7 METASTATIC COLON CANCER TO LIVER: ICD-10-CM

## 2024-09-04 DIAGNOSIS — C78.7 METASTASIS TO LIVER: ICD-10-CM

## 2024-09-04 LAB
ALBUMIN SERPL-MCNC: 3.4 G/DL (ref 3.5–5)
ALBUMIN/GLOB SERPL: 0.9 RATIO (ref 1.1–2)
ALP SERPL-CCNC: 745 UNIT/L (ref 40–150)
ALT SERPL-CCNC: 14 UNIT/L (ref 0–55)
ANION GAP SERPL CALC-SCNC: 11 MEQ/L
ANISOCYTOSIS BLD QL SMEAR: ABNORMAL
AST SERPL-CCNC: 26 UNIT/L (ref 5–34)
BASOPHILS # BLD AUTO: 0.04 X10(3)/MCL
BASOPHILS NFR BLD AUTO: 0.6 %
BILIRUB SERPL-MCNC: 0.6 MG/DL
BUN SERPL-MCNC: 13 MG/DL (ref 9.8–20.1)
CALCIUM SERPL-MCNC: 10.2 MG/DL (ref 8.4–10.2)
CEA SERPL-MCNC: 1435.69 NG/ML (ref 0–3)
CHLORIDE SERPL-SCNC: 108 MMOL/L (ref 98–107)
CO2 SERPL-SCNC: 21 MMOL/L (ref 22–29)
CREAT SERPL-MCNC: 0.78 MG/DL (ref 0.55–1.02)
CREAT/UREA NIT SERPL: 17
EOSINOPHIL # BLD AUTO: 0 X10(3)/MCL (ref 0–0.9)
EOSINOPHIL NFR BLD AUTO: 0 %
ERYTHROCYTE [DISTWIDTH] IN BLOOD BY AUTOMATED COUNT: 24.8 % (ref 11.5–17)
GFR SERPLBLD CREATININE-BSD FMLA CKD-EPI: >60 ML/MIN/1.73/M2
GLOBULIN SER-MCNC: 3.7 GM/DL (ref 2.4–3.5)
GLUCOSE SERPL-MCNC: 113 MG/DL (ref 74–100)
HCT VFR BLD AUTO: 37.3 % (ref 37–47)
HGB BLD-MCNC: 12 G/DL (ref 12–16)
IMM GRANULOCYTES # BLD AUTO: 0.02 X10(3)/MCL (ref 0–0.04)
IMM GRANULOCYTES NFR BLD AUTO: 0.3 %
LYMPHOCYTES # BLD AUTO: 1.66 X10(3)/MCL (ref 0.6–4.6)
LYMPHOCYTES NFR BLD AUTO: 25.7 %
MCH RBC QN AUTO: 27.2 PG (ref 27–31)
MCHC RBC AUTO-ENTMCNC: 32.2 G/DL (ref 33–36)
MCV RBC AUTO: 84.6 FL (ref 80–94)
MONOCYTES # BLD AUTO: 0.63 X10(3)/MCL (ref 0.1–1.3)
MONOCYTES NFR BLD AUTO: 9.8 %
NEUTROPHILS # BLD AUTO: 4.1 X10(3)/MCL (ref 2.1–9.2)
NEUTROPHILS NFR BLD AUTO: 63.6 %
PLATELET # BLD AUTO: 104 X10(3)/MCL (ref 130–400)
PLATELET # BLD EST: ABNORMAL 10*3/UL
PMV BLD AUTO: 9.1 FL (ref 7.4–10.4)
POTASSIUM SERPL-SCNC: 3.4 MMOL/L (ref 3.5–5.1)
PROT SERPL-MCNC: 7.1 GM/DL (ref 6.4–8.3)
RBC # BLD AUTO: 4.41 X10(6)/MCL (ref 4.2–5.4)
RBC MORPH BLD: ABNORMAL
SODIUM SERPL-SCNC: 140 MMOL/L (ref 136–145)
WBC # BLD AUTO: 6.45 X10(3)/MCL (ref 4.5–11.5)

## 2024-09-04 PROCEDURE — 85025 COMPLETE CBC W/AUTO DIFF WBC: CPT

## 2024-09-04 PROCEDURE — 80053 COMPREHEN METABOLIC PANEL: CPT

## 2024-09-04 PROCEDURE — 99999 PR PBB SHADOW E&M-EST. PATIENT-LVL IV: CPT | Mod: PBBFAC,,,

## 2024-09-04 PROCEDURE — 82378 CARCINOEMBRYONIC ANTIGEN: CPT

## 2024-09-04 PROCEDURE — 3008F BODY MASS INDEX DOCD: CPT | Mod: CPTII,S$GLB,,

## 2024-09-04 PROCEDURE — 1160F RVW MEDS BY RX/DR IN RCRD: CPT | Mod: CPTII,S$GLB,,

## 2024-09-04 PROCEDURE — 36415 COLL VENOUS BLD VENIPUNCTURE: CPT

## 2024-09-04 PROCEDURE — 99215 OFFICE O/P EST HI 40 MIN: CPT | Mod: S$GLB,,,

## 2024-09-04 PROCEDURE — 3074F SYST BP LT 130 MM HG: CPT | Mod: CPTII,S$GLB,,

## 2024-09-04 PROCEDURE — 1159F MED LIST DOCD IN RCRD: CPT | Mod: CPTII,S$GLB,,

## 2024-09-04 PROCEDURE — 3078F DIAST BP <80 MM HG: CPT | Mod: CPTII,S$GLB,,

## 2024-09-04 RX ORDER — HEPARIN 100 UNIT/ML
500 SYRINGE INTRAVENOUS
Status: CANCELLED | OUTPATIENT
Start: 2024-09-04

## 2024-09-04 RX ORDER — PROCHLORPERAZINE EDISYLATE 5 MG/ML
5 INJECTION INTRAMUSCULAR; INTRAVENOUS ONCE AS NEEDED
Status: CANCELLED
Start: 2024-09-04

## 2024-09-04 RX ORDER — DIPHENHYDRAMINE HYDROCHLORIDE 50 MG/ML
50 INJECTION INTRAMUSCULAR; INTRAVENOUS ONCE AS NEEDED
Status: CANCELLED | OUTPATIENT
Start: 2024-09-04

## 2024-09-04 RX ORDER — POTASSIUM CHLORIDE 20 MEQ/1
20 TABLET, EXTENDED RELEASE ORAL DAILY
Qty: 30 TABLET | Refills: 0 | Status: SHIPPED | OUTPATIENT
Start: 2024-09-04 | End: 2024-10-04

## 2024-09-04 RX ORDER — EPINEPHRINE 0.3 MG/.3ML
0.3 INJECTION SUBCUTANEOUS ONCE AS NEEDED
Status: CANCELLED | OUTPATIENT
Start: 2024-09-04

## 2024-09-04 RX ORDER — SODIUM CHLORIDE 0.9 % (FLUSH) 0.9 %
10 SYRINGE (ML) INJECTION
Status: CANCELLED | OUTPATIENT
Start: 2024-09-04

## 2024-09-04 NOTE — PROGRESS NOTES
Subjective:       Patient ID: Shira Nair is a 54 y.o. female.    Chief Complaint: New Patient    Diagnosis: Metastatic Colon Adenocarcinoma - mets to liver and lung    Current Treatment:  Xelox started 8/12/2024    Treatment History: FOLFOX q2w x 12 cycles-- to start 7/29/24    HPI  55yo F presented in June '24 for evaluation of liver metastases. She presented to OSH in May '24 with 1 month of abdominal pain. CT A/P at that time revealed numerous hepatic lesions, largest measuring 5.8 x 7cm, concerning for metastatic disease. CT Chest revealed numerous new bilateral pulmonary nodules measuring 3-4mm in size of indeterminate etiology. IR liver biopsy was done which was completely necrotic. An egd and colonoscopy done with Dr. Jones in June '24 revealed a severely stenotic malignant lesion in the mid descending colon that was unable to be traversed. Biopsy was taken which revealed at least high grade dysplasia suspicious for adenocarcinoma. Her CEA at time of imaging/workup was 546. She has a history of cervical cancer about 15 years ago, she had a total hysterectomy for this. She has a history of a benign brain tumor that required  shunt placed in the 1980s. She also had benign thyroid tumors removed about 8 years prior to presentation.     She underwent mediport placement and liver biopsy with ex lap with Dr. Cast on 7/18/24. Liver biopsy returned positive for metastatic colorectal adenocarcinoma. Will plan to proceed with 1st line FOLFOX with 3rd agent pending NGS studies.     Interval History:  She returns to the clinic today for C3 Xelox, accompanied by her sister. She did get C1 FOLFOX on 7/29/2024, reported that her dog chewed through 5FU pump cord.  She feels well today and feels that she tolerated treatment well. Due her history of drug use, a drug screen was performed which was positive for fentanyl. Results were discussed in detail with her. Results discussed with Dr. LeJeune and decision  was made to change form FOLFOX to Xelox. She denies any nausea, vomiting, diarrhea, constipation, SOB, CP, headache, or swelling. Instructed patient on how to take xeloda and side effects, verbalized understanding. She had an ER visit on 9/1/2024 for abdominal pain and was diagnosed with diverticulitis. She started Levaquin 500 mg once a day for 7 days on yesterday. She reports abdominal pain has resolved, last bowel movement was two days ago. She does take lactulose for constipation. She is followed by palliative care, she is taking MS Contin 15 mg BID. She does report mild neuropathy to bilateral finger tips.         Past Medical History:   Diagnosis Date    Brain tumor     Cervical ca     Depression     Malignant neoplasm of colon, unspecified     Opioid abuse       Past Surgical History:   Procedure Laterality Date    BRAIN SURGERY      CHOLECYSTECTOMY      COLONOSCOPY N/A 06/06/2024    Procedure: COLON;  Surgeon: Kevin Causey MD;  Location: [facility] ENDOSCOPY;  Service: Gastroenterology;  Laterality: N/A;    COLONOSCOPY, WITH 1 OR MORE BIOPSIES  06/06/2024    Procedure: COLONOSCOPY, WITH 1 OR MORE BIOPSIES;  Surgeon: Kevin Causey MD;  Location: [facility] ENDOSCOPY;  Service: Gastroenterology;;    COLONOSCOPY, WITH DIRECTED SUBMUCOSAL INJECTION  06/06/2024    Procedure: COLONOSCOPY, WITH DIRECTED SUBMUCOSAL INJECTION;  Surgeon: Kevin Causey MD;  Location: [facility] ENDOSCOPY;  Service: Gastroenterology;;    DIAGNOSTIC LAPAROSCOPY N/A 7/18/2024    Procedure: LAPAROSCOPY, DIAGNOSTIC;  Surgeon: Rubio Cast MD;  Location: [facility];  Service: General;  Laterality: N/A;  WITH LAP LIVER BIOPSY - ULTRASOUND GUIDED    EGD, WITH CLOSED BIOPSY  06/06/2024    Procedure: EGD, WITH CLOSED BIOPSY;  Surgeon: Kevin Causey MD;  Location: [facility] ENDOSCOPY;  Service: Gastroenterology;;    ESOPHAGOGASTRODUODENOSCOPY N/A 06/06/2024    Procedure: DOUBLE;  Surgeon: Kevin Causey  MD KIRSTEN;  Location: [facility] ENDOSCOPY;  Service: Gastroenterology;  Laterality: N/A;    HYSTERECTOMY      INSERTION OF TUNNELED CENTRAL VENOUS CATHETER (CVC) WITH SUBCUTANEOUS PORT N/A 7/18/2024    Procedure: [id number];  Surgeon: Rubio Cast MD;  Location: [facility];  Service: General;  Laterality: N/A;    KNEE SURGERY      THYROID LOBECTOMY       Social History     Socioeconomic History    Marital status: Single   Tobacco Use    Smoking status: Never     Passive exposure: Never    Smokeless tobacco: Never   Substance and Sexual Activity    Alcohol use: Never    Drug use: Not Currently     Types: Cocaine     Comment: 5-6 YEARS QUIT ON SUBOXONE      Family History   Problem Relation Name Age of Onset    Hypertension Mother      Hypertension Father      Stroke Father      Pancreatic cancer Brother      Cancer Paternal Grandfather        Review of patient's allergies indicates:   Allergen Reactions    Ketorolac Hives    Tramadol Hives    Vancomycin analogues Hives      Review of Systems   Constitutional:  Negative for activity change, fatigue, fever and unexpected weight change.   HENT:  Negative for sore throat.    Eyes:  Negative for visual disturbance.   Respiratory:  Negative for cough and shortness of breath.    Cardiovascular:  Negative for chest pain.   Gastrointestinal:  Negative for abdominal pain, diarrhea, nausea and vomiting.   Endocrine: Negative for polyuria.   Genitourinary:  Negative for dysuria and hot flashes.   Integumentary:  Negative for rash.   Allergic/Immunologic: Negative for immunocompromised state.   Neurological:  Negative for weakness and headaches.   Hematological:  Negative for adenopathy.   Psychiatric/Behavioral:  Negative for confusion.          Objective:      Vitals:    09/04/24 0904   BP: 93/64   Resp: 20   Temp: 97.5 °F (36.4 °C)         Physical Exam  Constitutional:       General: She is not in acute distress.     Appearance: Normal appearance. She is not  ill-appearing.   HENT:      Head: Normocephalic and atraumatic.      Nose: Nose normal.      Mouth/Throat:      Mouth: Mucous membranes are moist.      Pharynx: Oropharynx is clear.   Eyes:      Extraocular Movements: Extraocular movements intact.      Conjunctiva/sclera: Conjunctivae normal.      Pupils: Pupils are equal, round, and reactive to light.   Cardiovascular:      Rate and Rhythm: Normal rate and regular rhythm.      Pulses: Normal pulses.      Heart sounds: Normal heart sounds. No murmur heard.  Pulmonary:      Effort: Pulmonary effort is normal. No respiratory distress.      Breath sounds: Normal breath sounds.   Abdominal:      General: There is no distension.      Palpations: Abdomen is soft.      Tenderness: There is no abdominal tenderness.   Musculoskeletal:         General: Normal range of motion.      Cervical back: Normal range of motion and neck supple.      Right lower leg: No edema.      Left lower leg: No edema.   Lymphadenopathy:      Cervical: No cervical adenopathy.   Skin:     General: Skin is warm and dry.   Neurological:      General: No focal deficit present.      Mental Status: She is alert and oriented to person, place, and time.         LABS AND IMAGING REVIEWED IN EPIC  1/3/24 CT Abd/Pelvis:  1. Postoperative changes are present compatible with a previous cholecystectomy. An elliptical calcification is noted within the gallbladder fossa and I suspect represents chronic calcification of a postoperative hematoma.  Clinical correlation is recommended.  2. Left-sided, nonobstructing nephrolithiasis as described above.  3. Chronic changes are present as described above.  See above comments.  5/13/24 CT Abd/Pelvis:  Changes most consistent with metastatic disease to the liver.   5/14/24 CT Chest:  Small indeterminate pulmonary nodules which were not seen in 2017. These can be followed on surveillance scans.   6/27/24 PET/CT:  1. Hepatic extensive metastatic involvement with  hypermetabolic masses.  2. Descending colon concentric mural thickening and hypermetabolism is suggested to be the focus of colon carcinoma.  7/23/24 CT Abd/Pelvis:  1. A few subcentimeter non- calcified nodules are seen in the anterior and lateral middle lobe as well as posterior lower lobe, some of which are seen stable in the prior study. This are of concern for metastasis.   2. Multiple hypoenhancing lesions are again seen in both hepatic lobes with subtle decreased in size in the aggregate of lesions in the anterior right hepatic lobe measuring 5.9 x 5 cm (AP x T) on series 3 image 29. These are ascribed to metastatic lesions.   3. There is stable 6 cm long annular wall thickening along the descending colon centered on series 6 image 32. This is consistent with the known primary colon malignancy.   4. Details and other findings as discussed above.      Pathology:  6/6/24 EGD/Colonoscopy Biopsy:  1. STOMACH BIOPSY:    -MODERATE CHRONIC GASTRITIS, FOCALLY ACTIVE (SEE COMMENT).    -NO EVIDENCE OF DYSPLASIA OR MALIGNANCY.   2. COLON STRICTURE BIOPSY:    -FRAGMENTS OF COLONIC-TYPE MUCOSA WITH AT LEAST HIGH GRADE DYSPLASIA, SUSPICIOUS   FOR ADENOCARCINOMA (SEE COMMENTS).   7/18/24 Laparoscopy:  1. PERITONEUM, BIOPSY:    -MESOTHELIUM-LINED MEMBRANOUS FIBROADIPOSE TISSUE. NO NEOPLASM.   2. LEFT LOBE OF LIVER, WEDGE BIOPSY:    -METASTATIC COLORECTAL ADENOCARCINOMA.     Assessment:   Metastatic Colorectal Adenocarcinoma - mets to liver, extensive and unresectable.   PET/CT 6/27/24 revealed hepatic extensive metastatic involvement with hypermetabolic masses and descending colon  Per Dr. Cast no role for diversion at this time given no symptoms. Colon cancer of the descending colon with unresectable liver metastasis, relatively bulky disease within the liver.   Laparoscopy and mediport placement performed 7/18/24, liver with metastatic colorectal adenocarcinoma, NGS pending  Plan for FOLFOX q2w x 12 cycles, to start  7/29  Dog chewed through pump cord for C1, drug screen positive for fentynal and opiates. Will switch to xelox at this time.         Plan:   Labs and exam stable. Proceed with C3 Xelox  Potassium chloride 20 meq daily sent to pharmacy   Xeloda 1500mg BID days 1-14/21 with oxali q3w.    Continue follow up with pain management  RTC in 3 weeks with NP for FU/lab   Cbc, cmp, cea- 1 hr prior @ [facility]    I spent a total of 40 minutes on the day of the visit.This includes face to face time and non-face to face time preparing to see the patient (eg, review of tests), obtaining and/or reviewing separately obtained history, documenting clinical information in the electronic or other health record, independently interpreting results and communicating results to the patient/family/caregiver, or care coordinator.        Nicolasa Presley, FNP-C  Oncology/Hematology  [facility]

## 2024-09-05 ENCOUNTER — INFUSION (OUTPATIENT)
Facility: HOSPITAL | Age: 54
End: 2024-09-05
Attending: STUDENT IN AN ORGANIZED HEALTH CARE EDUCATION/TRAINING PROGRAM
Payer: MEDICARE

## 2024-09-05 VITALS
BODY MASS INDEX: 21.43 KG/M2 | WEIGHT: 149.69 LBS | HEIGHT: 70 IN | DIASTOLIC BLOOD PRESSURE: 91 MMHG | RESPIRATION RATE: 20 BRPM | SYSTOLIC BLOOD PRESSURE: 141 MMHG | OXYGEN SATURATION: 98 % | TEMPERATURE: 98 F | HEART RATE: 82 BPM

## 2024-09-05 DIAGNOSIS — C78.7 METASTASIS TO LIVER: ICD-10-CM

## 2024-09-05 DIAGNOSIS — C18.9 COLON ADENOCARCINOMA: Primary | ICD-10-CM

## 2024-09-05 LAB
OHS QRS DURATION: 76 MS
OHS QTC CALCULATION: 353 MS

## 2024-09-05 PROCEDURE — 96415 CHEMO IV INFUSION ADDL HR: CPT

## 2024-09-05 PROCEDURE — 96367 TX/PROPH/DG ADDL SEQ IV INF: CPT

## 2024-09-05 PROCEDURE — 63600175 PHARM REV CODE 636 W HCPCS

## 2024-09-05 PROCEDURE — 96413 CHEMO IV INFUSION 1 HR: CPT

## 2024-09-05 PROCEDURE — 25000003 PHARM REV CODE 250

## 2024-09-05 RX ORDER — DIPHENHYDRAMINE HYDROCHLORIDE 50 MG/ML
50 INJECTION INTRAMUSCULAR; INTRAVENOUS ONCE AS NEEDED
Status: DISCONTINUED | OUTPATIENT
Start: 2024-09-05 | End: 2024-09-05 | Stop reason: HOSPADM

## 2024-09-05 RX ORDER — PROCHLORPERAZINE EDISYLATE 5 MG/ML
5 INJECTION INTRAMUSCULAR; INTRAVENOUS ONCE AS NEEDED
Status: DISCONTINUED | OUTPATIENT
Start: 2024-09-05 | End: 2024-09-05 | Stop reason: HOSPADM

## 2024-09-05 RX ORDER — SODIUM CHLORIDE 0.9 % (FLUSH) 0.9 %
10 SYRINGE (ML) INJECTION
Status: DISCONTINUED | OUTPATIENT
Start: 2024-09-05 | End: 2024-09-05 | Stop reason: HOSPADM

## 2024-09-05 RX ORDER — EPINEPHRINE 0.3 MG/.3ML
0.3 INJECTION SUBCUTANEOUS ONCE AS NEEDED
Status: DISCONTINUED | OUTPATIENT
Start: 2024-09-05 | End: 2024-09-05 | Stop reason: HOSPADM

## 2024-09-05 RX ORDER — HEPARIN 100 UNIT/ML
500 SYRINGE INTRAVENOUS
Status: DISCONTINUED | OUTPATIENT
Start: 2024-09-05 | End: 2024-09-05 | Stop reason: HOSPADM

## 2024-09-05 RX ADMIN — OXALIPLATIN 237 MG: 100 INJECTION, SOLUTION, CONCENTRATE INTRAVENOUS at 09:09

## 2024-09-05 RX ADMIN — DEXAMETHASONE SODIUM PHOSPHATE 0.25 MG: 4 INJECTION, SOLUTION INTRA-ARTICULAR; INTRALESIONAL; INTRAMUSCULAR; INTRAVENOUS; SOFT TISSUE at 09:09

## 2024-09-05 RX ADMIN — HEPARIN 500 UNITS: 100 SYRINGE at 11:09

## 2024-09-05 RX ADMIN — DEXTROSE MONOHYDRATE: 50 INJECTION, SOLUTION INTRAVENOUS at 09:09

## 2024-09-19 DIAGNOSIS — C78.7 METASTASIS TO LIVER: ICD-10-CM

## 2024-09-19 DIAGNOSIS — C18.9 COLON ADENOCARCINOMA: ICD-10-CM

## 2024-09-19 RX ORDER — CAPECITABINE 500 MG/1
1500 TABLET, FILM COATED ORAL 2 TIMES DAILY
Qty: 84 TABLET | Refills: 0 | Status: CANCELLED | OUTPATIENT
Start: 2024-09-19 | End: 2024-10-03

## 2024-09-19 RX ORDER — CAPECITABINE 500 MG/1
1500 TABLET, FILM COATED ORAL 2 TIMES DAILY
Qty: 84 TABLET | Refills: 0 | Status: ACTIVE | OUTPATIENT
Start: 2024-09-19 | End: 2024-10-10

## 2024-09-22 ENCOUNTER — HOSPITAL ENCOUNTER (EMERGENCY)
Facility: HOSPITAL | Age: 54
Discharge: HOME | End: 2024-09-22
Attending: FAMILY MEDICINE
Payer: MEDICARE

## 2024-09-22 VITALS
HEIGHT: 70 IN | OXYGEN SATURATION: 97 % | TEMPERATURE: 98 F | DIASTOLIC BLOOD PRESSURE: 87 MMHG | RESPIRATION RATE: 18 BRPM | BODY MASS INDEX: 21.33 KG/M2 | HEART RATE: 108 BPM | WEIGHT: 149 LBS | SYSTOLIC BLOOD PRESSURE: 138 MMHG

## 2024-09-22 DIAGNOSIS — R10.9 ABDOMINAL PAIN, UNSPECIFIED ABDOMINAL LOCATION: Primary | ICD-10-CM

## 2024-09-22 PROCEDURE — 96372 THER/PROPH/DIAG INJ SC/IM: CPT | Performed by: FAMILY MEDICINE

## 2024-09-22 PROCEDURE — 99284 EMERGENCY DEPT VISIT MOD MDM: CPT | Mod: 25

## 2024-09-22 PROCEDURE — 63600175 PHARM REV CODE 636 W HCPCS: Performed by: FAMILY MEDICINE

## 2024-09-22 RX ORDER — HYDROMORPHONE HYDROCHLORIDE 2 MG/ML
2 INJECTION, SOLUTION INTRAMUSCULAR; INTRAVENOUS; SUBCUTANEOUS
Status: COMPLETED | OUTPATIENT
Start: 2024-09-22 | End: 2024-09-22

## 2024-09-22 RX ORDER — PROMETHAZINE HYDROCHLORIDE 25 MG/ML
12.5 INJECTION, SOLUTION INTRAMUSCULAR; INTRAVENOUS
Status: COMPLETED | OUTPATIENT
Start: 2024-09-22 | End: 2024-09-22

## 2024-09-22 RX ADMIN — HYDROMORPHONE HYDROCHLORIDE 2 MG: 2 INJECTION INTRAMUSCULAR; INTRAVENOUS; SUBCUTANEOUS at 11:09

## 2024-09-22 RX ADMIN — PROMETHAZINE HYDROCHLORIDE 12.5 MG: 25 INJECTION INTRAMUSCULAR; INTRAVENOUS at 11:09

## 2024-09-22 NOTE — ED PROVIDER NOTES
Encounter Date: 9/22/2024       History       Chief Complaint  Patient presents with  · Abdominal Pain  · Nausea  · Vomiting    Pt presented to ED per POV with c/o abdominal pain associated with nausea and vomiting. Pt reports no bowel movement for one week. Pt has hx of colon & liver cancer and is currently receiving chemotherapy.  The patient says that she has a stage IV carcinoma: That has metastasized into the liver she has been hurting in the abdomen in the lower part and she is already on chemotherapy for that she said that she took morphine this morning that did not help with the pain she is here to help and get rid of the pain she refused all the diagnostics and the labs and the CT scan she does not want additional radiation to her body she said that she is just here for the pain medication and the pain relief refused all the diagnostics risks and complications explained patient exhibited understanding        Review of patient's allergies indicates:   Allergen Reactions    Ketorolac Hives    Tramadol Hives    Vancomycin analogues Hives     Past Medical History:   Diagnosis Date    Brain tumor     Cervical ca     Depression     Malignant neoplasm of colon, unspecified     Opioid abuse      Past Surgical History:   Procedure Laterality Date    BRAIN SURGERY      CHOLECYSTECTOMY      COLONOSCOPY N/A 06/06/2024    Procedure: COLON;  Surgeon: Kevin Causey MD;  Location: [facility] ENDOSCOPY;  Service: Gastroenterology;  Laterality: N/A;    COLONOSCOPY, WITH 1 OR MORE BIOPSIES  06/06/2024    Procedure: COLONOSCOPY, WITH 1 OR MORE BIOPSIES;  Surgeon: Kevin Causey MD;  Location: [facility] ENDOSCOPY;  Service: Gastroenterology;;    COLONOSCOPY, WITH DIRECTED SUBMUCOSAL INJECTION  06/06/2024    Procedure: COLONOSCOPY, WITH DIRECTED SUBMUCOSAL INJECTION;  Surgeon: Kevin Causey MD;  Location: [facility] ENDOSCOPY;  Service: Gastroenterology;;    DIAGNOSTIC LAPAROSCOPY N/A 7/18/2024     Procedure: LAPAROSCOPY, DIAGNOSTIC;  Surgeon: Rubio Cast MD;  Location: [facility];  Service: General;  Laterality: N/A;  WITH LAP LIVER BIOPSY - ULTRASOUND GUIDED    EGD, WITH CLOSED BIOPSY  06/06/2024    Procedure: EGD, WITH CLOSED BIOPSY;  Surgeon: Kevin Causey MD;  Location: [facility] ENDOSCOPY;  Service: Gastroenterology;;    ESOPHAGOGASTRODUODENOSCOPY N/A 06/06/2024    Procedure: DOUBLE;  Surgeon: Kevin Causey MD;  Location: [facility] ENDOSCOPY;  Service: Gastroenterology;  Laterality: N/A;    HYSTERECTOMY      INSERTION OF TUNNELED CENTRAL VENOUS CATHETER (CVC) WITH SUBCUTANEOUS PORT N/A 7/18/2024    Procedure: [id number];  Surgeon: Rubio Csat MD;  Location: [facility];  Service: General;  Laterality: N/A;    KNEE SURGERY      THYROID LOBECTOMY       Family History   Problem Relation Name Age of Onset    Hypertension Mother      Hypertension Father      Stroke Father      Pancreatic cancer Brother      Cancer Paternal Grandfather       Social History     Tobacco Use    Smoking status: Never     Passive exposure: Never    Smokeless tobacco: Never   Substance Use Topics    Alcohol use: Never    Drug use: Not Currently     Types: Cocaine     Comment: 5-6 YEARS QUIT ON SUBOXONE     Review of Systems   Constitutional:  Negative for fever.   HENT:  Negative for congestion, dental problem, drooling and sore throat.    Eyes:  Negative for pain, discharge and itching.   Respiratory:  Negative for shortness of breath.    Cardiovascular:  Negative for chest pain.   Gastrointestinal:  Positive for abdominal pain. Negative for nausea.   Endocrine: Negative for cold intolerance, heat intolerance and polydipsia.   Genitourinary:  Negative for dysuria.   Musculoskeletal:  Negative for back pain.   Skin:  Negative for rash.   Neurological:  Negative for dizziness, facial asymmetry, weakness, light-headedness and headaches.   Hematological:  Does not bruise/bleed easily.    Psychiatric/Behavioral:  Negative for agitation, behavioral problems, confusion and decreased concentration.        Physical Exam     Initial Vitals [09/22/24 1040]   BP Pulse Resp Temp SpO2   138/89 (!) 111 18 97.9 °F (36.6 °C) 96 %      MAP       --         Physical Exam    Nursing note and vitals reviewed.  Constitutional: She appears well-developed.   HENT:   Head: Normocephalic and atraumatic.   Eyes: Pupils are equal, round, and reactive to light.   Neck:   Normal range of motion.  Cardiovascular:  Normal rate, regular rhythm, normal heart sounds and intact distal pulses.           Pulmonary/Chest: Breath sounds normal. No respiratory distress. She has no wheezes.   Abdominal: Abdomen is soft. She exhibits no distension and no mass. There is abdominal tenderness.   Hypoactive bowel sounds mild tenderness over the lower aspect of the abdomen no rebound no rigidity There is no rebound and no guarding.   Musculoskeletal:         General: Normal range of motion.      Cervical back: Normal range of motion.     Skin: Skin is warm.   Psychiatric: She has a normal mood and affect.         ED Course   Procedures  Labs Reviewed - No data to display       Imaging Results    None          Medications   HYDROmorphone (PF) injection 2 mg (has no administration in time range)   promethazine injection 12.5 mg (has no administration in time range)     Medical Decision Making  Patient says that she already has an established diagnosis of carcinoma colon that has metastasized to the liver stage IV she is already on chemotherapy she does not want additional diagnostics or lab work she refused all the diagnostics she just wanted pain relief risks and complications explained patient exhibited understanding                                      Clinical Impression:  Final diagnoses:  [R10.9] Abdominal pain, unspecified abdominal location (Primary)          ED Disposition Condition    Discharge Stable          ED Prescriptions     None       Follow-up Information       Follow up With Specialties Details Why Contact Info    Amado Baig III, MD Family Medicine Call in 1 day  [address] [address]  [phone]      Amado Baig III, MD Family Medicine   [address] [address]  [phone]               Everett Sánchez MD  09/22/24 1054

## 2024-09-25 ENCOUNTER — INFUSION (OUTPATIENT)
Facility: HOSPITAL | Age: 54
End: 2024-09-25
Payer: MEDICARE

## 2024-09-25 ENCOUNTER — OFFICE VISIT (OUTPATIENT)
Dept: HEMATOLOGY/ONCOLOGY | Facility: CLINIC | Age: 54
End: 2024-09-25
Payer: MEDICARE

## 2024-09-25 ENCOUNTER — LAB VISIT (OUTPATIENT)
Dept: LAB | Facility: HOSPITAL | Age: 54
End: 2024-09-25
Payer: MEDICARE

## 2024-09-25 VITALS
BODY MASS INDEX: 20.79 KG/M2 | DIASTOLIC BLOOD PRESSURE: 72 MMHG | HEIGHT: 70 IN | WEIGHT: 145.19 LBS | OXYGEN SATURATION: 97 % | HEART RATE: 84 BPM | RESPIRATION RATE: 18 BRPM | TEMPERATURE: 98 F | SYSTOLIC BLOOD PRESSURE: 110 MMHG

## 2024-09-25 DIAGNOSIS — C18.9 COLON ADENOCARCINOMA: ICD-10-CM

## 2024-09-25 DIAGNOSIS — Z79.899 ON ANTINEOPLASTIC CHEMOTHERAPY: ICD-10-CM

## 2024-09-25 DIAGNOSIS — D69.59 CHEMOTHERAPY-INDUCED THROMBOCYTOPENIA: ICD-10-CM

## 2024-09-25 DIAGNOSIS — C78.00 MALIGNANT NEOPLASM METASTATIC TO LUNG, UNSPECIFIED LATERALITY: ICD-10-CM

## 2024-09-25 DIAGNOSIS — T45.1X5A CHEMOTHERAPY-INDUCED THROMBOCYTOPENIA: ICD-10-CM

## 2024-09-25 DIAGNOSIS — E83.52 HYPERCALCEMIA: Primary | ICD-10-CM

## 2024-09-25 DIAGNOSIS — C78.7 METASTASIS TO LIVER: ICD-10-CM

## 2024-09-25 DIAGNOSIS — E87.6 HYPOKALEMIA: ICD-10-CM

## 2024-09-25 DIAGNOSIS — C18.9 COLON ADENOCARCINOMA: Primary | ICD-10-CM

## 2024-09-25 LAB
ALBUMIN SERPL-MCNC: 3.8 G/DL (ref 3.4–5)
ALBUMIN/GLOB SERPL: 1.2 RATIO
ALP SERPL-CCNC: 715 UNIT/L (ref 50–144)
ALT SERPL-CCNC: 34 UNIT/L (ref 1–45)
ANION GAP SERPL CALC-SCNC: 6 MEQ/L (ref 2–13)
ANISOCYTOSIS BLD QL SMEAR: ABNORMAL
AST SERPL-CCNC: 73 UNIT/L (ref 14–36)
BASOPHILS # BLD AUTO: 0.01 X10(3)/MCL (ref 0.01–0.08)
BASOPHILS NFR BLD AUTO: 0.2 % (ref 0.1–1.2)
BILIRUB SERPL-MCNC: 1.8 MG/DL (ref 0–1)
BUN SERPL-MCNC: 10 MG/DL (ref 7–20)
CALCIUM SERPL-MCNC: 9.7 MG/DL (ref 8.4–10.2)
CEA SERPL-MCNC: 761.1 NG/ML (ref 0–3)
CHLORIDE SERPL-SCNC: 101 MMOL/L (ref 98–110)
CO2 SERPL-SCNC: 32 MMOL/L (ref 21–32)
CREAT SERPL-MCNC: 0.8 MG/DL (ref 0.66–1.25)
CREAT/UREA NIT SERPL: 13 (ref 12–20)
EOSINOPHIL # BLD AUTO: 0.01 X10(3)/MCL (ref 0.04–0.36)
EOSINOPHIL NFR BLD AUTO: 0.2 % (ref 0.7–7)
ERYTHROCYTE [DISTWIDTH] IN BLOOD BY AUTOMATED COUNT: 24.4 % (ref 11–14.5)
GFR SERPLBLD CREATININE-BSD FMLA CKD-EPI: 88 ML/MIN/1.73/M2
GLOBULIN SER-MCNC: 3.1 GM/DL (ref 2–3.9)
GLUCOSE SERPL-MCNC: 160 MG/DL (ref 70–115)
HCT VFR BLD AUTO: 35.2 % (ref 36–48)
HGB BLD-MCNC: 11.6 G/DL (ref 11.8–16)
IMM GRANULOCYTES # BLD AUTO: 0.01 X10(3)/MCL (ref 0–0.03)
IMM GRANULOCYTES NFR BLD AUTO: 0.2 % (ref 0–0.5)
LYMPHOCYTES # BLD AUTO: 1.24 X10(3)/MCL (ref 1.16–3.74)
LYMPHOCYTES NFR BLD AUTO: 29.2 % (ref 20–55)
MCH RBC QN AUTO: 28.4 PG (ref 27–34)
MCHC RBC AUTO-ENTMCNC: 33 G/DL (ref 31–37)
MCV RBC AUTO: 86.1 FL (ref 79–99)
MONOCYTES # BLD AUTO: 0.6 X10(3)/MCL (ref 0.24–0.36)
MONOCYTES NFR BLD AUTO: 14.2 % (ref 4.7–12.5)
NEUTROPHILS # BLD AUTO: 2.37 X10(3)/MCL (ref 1.56–6.13)
NEUTROPHILS NFR BLD AUTO: 56 % (ref 37–73)
NRBC BLD AUTO-RTO: 0 %
PLATELET # BLD AUTO: 70 X10(3)/MCL (ref 140–371)
PLATELET # BLD EST: ABNORMAL 10*3/UL
PMV BLD AUTO: 8.9 FL (ref 9.4–12.4)
POIKILOCYTOSIS BLD QL SMEAR: ABNORMAL
POTASSIUM SERPL-SCNC: 2.5 MMOL/L (ref 3.5–5.1)
PROT SERPL-MCNC: 6.9 GM/DL (ref 6.3–8.2)
RBC # BLD AUTO: 4.09 X10(6)/MCL (ref 4–5.1)
RBC MORPH BLD: ABNORMAL
SODIUM SERPL-SCNC: 139 MMOL/L (ref 136–145)
TEAR DROP CELL (OLG): SLIGHT
WBC # BLD AUTO: 4.24 X10(3)/MCL (ref 4–11.5)

## 2024-09-25 PROCEDURE — 96366 THER/PROPH/DIAG IV INF ADDON: CPT

## 2024-09-25 PROCEDURE — 96365 THER/PROPH/DIAG IV INF INIT: CPT

## 2024-09-25 PROCEDURE — 85025 COMPLETE CBC W/AUTO DIFF WBC: CPT

## 2024-09-25 PROCEDURE — 36415 COLL VENOUS BLD VENIPUNCTURE: CPT

## 2024-09-25 PROCEDURE — 63600175 PHARM REV CODE 636 W HCPCS

## 2024-09-25 PROCEDURE — 25000003 PHARM REV CODE 250

## 2024-09-25 PROCEDURE — 80053 COMPREHEN METABOLIC PANEL: CPT

## 2024-09-25 PROCEDURE — 82378 CARCINOEMBRYONIC ANTIGEN: CPT

## 2024-09-25 RX ORDER — SODIUM CHLORIDE 0.9 % (FLUSH) 0.9 %
10 SYRINGE (ML) INJECTION
Status: CANCELLED | OUTPATIENT
Start: 2024-09-25

## 2024-09-25 RX ORDER — HEPARIN 100 UNIT/ML
500 SYRINGE INTRAVENOUS
Status: DISCONTINUED | OUTPATIENT
Start: 2024-09-25 | End: 2024-09-25 | Stop reason: HOSPADM

## 2024-09-25 RX ORDER — HEPARIN 100 UNIT/ML
500 SYRINGE INTRAVENOUS
Status: CANCELLED | OUTPATIENT
Start: 2024-09-25

## 2024-09-25 RX ORDER — SODIUM CHLORIDE 0.9 % (FLUSH) 0.9 %
10 SYRINGE (ML) INJECTION
Status: DISCONTINUED | OUTPATIENT
Start: 2024-09-25 | End: 2024-09-25 | Stop reason: HOSPADM

## 2024-09-25 RX ADMIN — POTASSIUM CHLORIDE 500 ML/HR: 2 INJECTION, SOLUTION, CONCENTRATE INTRAVENOUS at 11:09

## 2024-09-25 NOTE — NURSING
Ns 1000ml with 40meq k+ administered, pt tolerated well. Pt discharged home in stable condition.

## 2024-09-25 NOTE — PROGRESS NOTES
Subjective:       Patient ID: Shira Nair is a 54 y.o. female.    Chief Complaint: New Patient    Diagnosis: Metastatic Colon Adenocarcinoma - mets to liver and lung    Current Treatment:  Xelox started 8/12/2024    Treatment History: FOLFOX q2w x 12 cycles-- to start 7/29/24    HPI  53yo F presented in June '24 for evaluation of liver metastases. She presented to OSH in May '24 with 1 month of abdominal pain. CT A/P at that time revealed numerous hepatic lesions, largest measuring 5.8 x 7cm, concerning for metastatic disease. CT Chest revealed numerous new bilateral pulmonary nodules measuring 3-4mm in size of indeterminate etiology. IR liver biopsy was done which was completely necrotic. An egd and colonoscopy done with Dr. Jones in June '24 revealed a severely stenotic malignant lesion in the mid descending colon that was unable to be traversed. Biopsy was taken which revealed at least high grade dysplasia suspicious for adenocarcinoma. Her CEA at time of imaging/workup was 546. She has a history of cervical cancer about 15 years ago, she had a total hysterectomy for this. She has a history of a benign brain tumor that required  shunt placed in the 1980s. She also had benign thyroid tumors removed about 8 years prior to presentation.     She underwent mediport placement and liver biopsy with ex lap with Dr. Cast on 7/18/24. Liver biopsy returned positive for metastatic colorectal adenocarcinoma. Will plan to proceed with 1st line FOLFOX with 3rd agent pending NGS studies.     Interval History:  She returns to the clinic today for C4 Xelox via phone call, sister on phone as well. She did get C1 FOLFOX on 7/29/2024, reported that her dog chewed through 5FU pump cord.  She feels well today and feels that she tolerated treatment well. Due her history of drug use, a drug screen was performed which was positive for fentanyl. Results were discussed in detail with her. Results discussed with Dr. LeJeune  and decision was made to change form FOLFOX to Xelox. She denies any nausea, vomiting, diarrhea, constipation, SOB, CP, headache, or swelling. Instructed patient on how to take xeloda and side effects, verbalized understanding. She took Xeloda for 21 days instead of 14 days as prescribed. She had an ER visit on 9/22/2024 for abdominal pain. She refused any imaging and only wanted pain medication.She is followed by palliative care, she is taking MS Contin  ER 15 mg BID and MS contin 15 mg immediate release as needed for breakthrough pain. She does report mild neuropathy to bilateral finger tips, grade 1. She does take lactulose for constipation. Labs reviewed with patient in detail, platelet count 70. Will hold treatment for 1 week.         Past Medical History:   Diagnosis Date    Brain tumor     Cervical ca     Depression     Malignant neoplasm of colon, unspecified     Opioid abuse       Past Surgical History:   Procedure Laterality Date    BRAIN SURGERY      CHOLECYSTECTOMY      COLONOSCOPY N/A 06/06/2024    Procedure: COLON;  Surgeon: Kevin Causey MD;  Location: [facility] ENDOSCOPY;  Service: Gastroenterology;  Laterality: N/A;    COLONOSCOPY, WITH 1 OR MORE BIOPSIES  06/06/2024    Procedure: COLONOSCOPY, WITH 1 OR MORE BIOPSIES;  Surgeon: Kevin Causey MD;  Location: [facility] ENDOSCOPY;  Service: Gastroenterology;;    COLONOSCOPY, WITH DIRECTED SUBMUCOSAL INJECTION  06/06/2024    Procedure: COLONOSCOPY, WITH DIRECTED SUBMUCOSAL INJECTION;  Surgeon: Kevin Causey MD;  Location: [facility] ENDOSCOPY;  Service: Gastroenterology;;    DIAGNOSTIC LAPAROSCOPY N/A 7/18/2024    Procedure: LAPAROSCOPY, DIAGNOSTIC;  Surgeon: Rubio Cast MD;  Location: [facility];  Service: General;  Laterality: N/A;  WITH LAP LIVER BIOPSY - ULTRASOUND GUIDED    EGD, WITH CLOSED BIOPSY  06/06/2024    Procedure: EGD, WITH CLOSED BIOPSY;  Surgeon: Kevin Causey MD;  Location: [facility];   Service: Gastroenterology;;    ESOPHAGOGASTRODUODENOSCOPY N/A 06/06/2024    Procedure: DOUBLE;  Surgeon: Kevin Causey MD;  Location: [facility] ENDOSCOPY;  Service: Gastroenterology;  Laterality: N/A;    HYSTERECTOMY      INSERTION OF TUNNELED CENTRAL VENOUS CATHETER (CVC) WITH SUBCUTANEOUS PORT N/A 7/18/2024    Procedure: [id number];  Surgeon: Rubio Cast MD;  Location: [facility] OR;  Service: General;  Laterality: N/A;    KNEE SURGERY      THYROID LOBECTOMY       Social History     Socioeconomic History    Marital status: Single   Tobacco Use    Smoking status: Never     Passive exposure: Never    Smokeless tobacco: Never   Substance and Sexual Activity    Alcohol use: Never    Drug use: Not Currently     Types: Cocaine     Comment: 5-6 YEARS QUIT ON SUBOXONE      Family History   Problem Relation Name Age of Onset    Hypertension Mother      Hypertension Father      Stroke Father      Pancreatic cancer Brother      Cancer Paternal Grandfather        Review of patient's allergies indicates:   Allergen Reactions    Ketorolac Hives    Tramadol Hives    Vancomycin analogues Hives      Review of Systems   Constitutional:  Negative for activity change, fatigue, fever and unexpected weight change.   HENT:  Negative for sore throat.    Eyes:  Negative for visual disturbance.   Respiratory:  Negative for cough and shortness of breath.    Cardiovascular:  Negative for chest pain.   Gastrointestinal:  Negative for abdominal pain, diarrhea, nausea and vomiting.   Endocrine: Negative for polyuria.   Genitourinary:  Negative for dysuria and hot flashes.   Integumentary:  Negative for rash.   Allergic/Immunologic: Negative for immunocompromised state.   Neurological:  Negative for weakness and headaches.   Hematological:  Negative for adenopathy.   Psychiatric/Behavioral:  Negative for confusion.          Objective:      There were no vitals filed for this visit.        Physical Exam  Constitutional:        General: She is not in acute distress.     Appearance: Normal appearance. She is not ill-appearing.   HENT:      Head: Normocephalic and atraumatic.      Nose: Nose normal.      Mouth/Throat:      Mouth: Mucous membranes are moist.      Pharynx: Oropharynx is clear.   Eyes:      Extraocular Movements: Extraocular movements intact.      Conjunctiva/sclera: Conjunctivae normal.      Pupils: Pupils are equal, round, and reactive to light.   Cardiovascular:      Rate and Rhythm: Normal rate and regular rhythm.      Pulses: Normal pulses.      Heart sounds: Normal heart sounds. No murmur heard.  Pulmonary:      Effort: Pulmonary effort is normal. No respiratory distress.      Breath sounds: Normal breath sounds.   Abdominal:      General: There is no distension.      Palpations: Abdomen is soft.      Tenderness: There is no abdominal tenderness.   Musculoskeletal:         General: Normal range of motion.      Cervical back: Normal range of motion and neck supple.      Right lower leg: No edema.      Left lower leg: No edema.   Lymphadenopathy:      Cervical: No cervical adenopathy.   Skin:     General: Skin is warm and dry.   Neurological:      General: No focal deficit present.      Mental Status: She is alert and oriented to person, place, and time.         LABS AND IMAGING REVIEWED IN EPIC  1/3/24 CT Abd/Pelvis:  1. Postoperative changes are present compatible with a previous cholecystectomy. An elliptical calcification is noted within the gallbladder fossa and I suspect represents chronic calcification of a postoperative hematoma.  Clinical correlation is recommended.  2. Left-sided, nonobstructing nephrolithiasis as described above.  3. Chronic changes are present as described above.  See above comments.  5/13/24 CT Abd/Pelvis:  Changes most consistent with metastatic disease to the liver.   5/14/24 CT Chest:  Small indeterminate pulmonary nodules which were not seen in 2017. These can be followed on surveillance  scans.   6/27/24 PET/CT:  1. Hepatic extensive metastatic involvement with hypermetabolic masses.  2. Descending colon concentric mural thickening and hypermetabolism is suggested to be the focus of colon carcinoma.  7/23/24 CT Abd/Pelvis:  1. A few subcentimeter non- calcified nodules are seen in the anterior and lateral middle lobe as well as posterior lower lobe, some of which are seen stable in the prior study. This are of concern for metastasis.   2. Multiple hypoenhancing lesions are again seen in both hepatic lobes with subtle decreased in size in the aggregate of lesions in the anterior right hepatic lobe measuring 5.9 x 5 cm (AP x T) on series 3 image 29. These are ascribed to metastatic lesions.   3. There is stable 6 cm long annular wall thickening along the descending colon centered on series 6 image 32. This is consistent with the known primary colon malignancy.   4. Details and other findings as discussed above.      Pathology:  6/6/24 EGD/Colonoscopy Biopsy:  1. STOMACH BIOPSY:    -MODERATE CHRONIC GASTRITIS, FOCALLY ACTIVE (SEE COMMENT).    -NO EVIDENCE OF DYSPLASIA OR MALIGNANCY.   2. COLON STRICTURE BIOPSY:    -FRAGMENTS OF COLONIC-TYPE MUCOSA WITH AT LEAST HIGH GRADE DYSPLASIA, SUSPICIOUS   FOR ADENOCARCINOMA (SEE COMMENTS).   7/18/24 Laparoscopy:  1. PERITONEUM, BIOPSY:    -MESOTHELIUM-LINED MEMBRANOUS FIBROADIPOSE TISSUE. NO NEOPLASM.   2. LEFT LOBE OF LIVER, WEDGE BIOPSY:    -METASTATIC COLORECTAL ADENOCARCINOMA.     Assessment:   Metastatic Colorectal Adenocarcinoma - mets to liver, extensive and unresectable.   PET/CT 6/27/24 revealed hepatic extensive metastatic involvement with hypermetabolic masses and descending colon  Per Dr. Cast no role for diversion at this time given no symptoms. Colon cancer of the descending colon with unresectable liver metastasis, relatively bulky disease within the liver.   Laparoscopy and mediport placement performed 7/18/24, liver with metastatic colorectal  adenocarcinoma, NGS pending  Plan for FOLFOX q2w x 12 cycles, to start 7/29  Dog chewed through pump cord for C1, drug screen positive for fentynal and opiates. Will switch to xelox at this time.         Plan:   Hold C4 XELOX x 1 week due to thrombocytopenia  Proceed with 1 L NS with 40 meq potassium  Start oral potassium as prescribed  Xeloda 1500mg BID days 1-14/21 with oxali q3w.    Continue follow up with pain management  RTC in 1 week with NP for FU/lab  C4 Xelox in [place]   Cbc, cmp, cea- 1 hr prior @ [facility]    I spent a total of 40 minutes on the day of the visit.This includes face to face time and non-face to face time preparing to see the patient (eg, review of tests), obtaining and/or reviewing separately obtained history, documenting clinical information in the electronic or other health record, independently interpreting results and communicating results to the patient/family/caregiver, or care coordinator.        Nicolasa Presley, ERENDIRAP-C  Oncology/Hematology  [facility]

## 2024-09-28 ENCOUNTER — HOSPITAL ENCOUNTER (EMERGENCY)
Facility: HOSPITAL | Age: 54
Discharge: HOME | End: 2024-09-28
Payer: MEDICARE

## 2024-09-28 VITALS
BODY MASS INDEX: 20.76 KG/M2 | TEMPERATURE: 98 F | RESPIRATION RATE: 18 BRPM | HEART RATE: 99 BPM | OXYGEN SATURATION: 99 % | HEIGHT: 70 IN | SYSTOLIC BLOOD PRESSURE: 147 MMHG | WEIGHT: 145 LBS | DIASTOLIC BLOOD PRESSURE: 94 MMHG

## 2024-09-28 DIAGNOSIS — R60.9 SWELLING: ICD-10-CM

## 2024-09-28 DIAGNOSIS — R60.0 MILD PERIPHERAL EDEMA: ICD-10-CM

## 2024-09-28 DIAGNOSIS — R10.31 CHRONIC BILATERAL LOWER ABDOMINAL PAIN: Primary | ICD-10-CM

## 2024-09-28 DIAGNOSIS — G89.29 CHRONIC BILATERAL LOWER ABDOMINAL PAIN: Primary | ICD-10-CM

## 2024-09-28 DIAGNOSIS — N30.01 ACUTE CYSTITIS WITH HEMATURIA: ICD-10-CM

## 2024-09-28 DIAGNOSIS — R10.32 CHRONIC BILATERAL LOWER ABDOMINAL PAIN: Primary | ICD-10-CM

## 2024-09-28 LAB
ALBUMIN SERPL-MCNC: 4.2 G/DL (ref 3.4–5)
ALBUMIN/GLOB SERPL: 1.3 RATIO
ALP SERPL-CCNC: 682 UNIT/L (ref 50–144)
ALT SERPL-CCNC: 33 UNIT/L (ref 1–45)
ANION GAP SERPL CALC-SCNC: 6 MEQ/L (ref 2–13)
ANISOCYTOSIS BLD QL SMEAR: ABNORMAL
AST SERPL-CCNC: 59 UNIT/L (ref 14–36)
BACTERIA #/AREA URNS AUTO: ABNORMAL /HPF
BASOPHILS # BLD AUTO: 0.02 X10(3)/MCL (ref 0.01–0.08)
BASOPHILS NFR BLD AUTO: 0.4 % (ref 0.1–1.2)
BILIRUB SERPL-MCNC: 1.8 MG/DL (ref 0–1)
BILIRUB UR QL STRIP.AUTO: ABNORMAL
BNP BLD-MCNC: 303 PG/ML (ref 0–124.9)
BUN SERPL-MCNC: 11 MG/DL (ref 7–20)
CALCIUM SERPL-MCNC: 10.5 MG/DL (ref 8.4–10.2)
CHLORIDE SERPL-SCNC: 104 MMOL/L (ref 98–110)
CLARITY UR: ABNORMAL
CO2 SERPL-SCNC: 32 MMOL/L (ref 21–32)
COLOR UR AUTO: YELLOW
CREAT SERPL-MCNC: 0.92 MG/DL (ref 0.66–1.25)
CREAT/UREA NIT SERPL: 12 (ref 12–20)
EOSINOPHIL # BLD AUTO: 0 X10(3)/MCL (ref 0.04–0.36)
EOSINOPHIL NFR BLD AUTO: 0 % (ref 0.7–7)
ERYTHROCYTE [DISTWIDTH] IN BLOOD BY AUTOMATED COUNT: 23.5 % (ref 11–14.5)
GFR SERPLBLD CREATININE-BSD FMLA CKD-EPI: 74 ML/MIN/1.73/M2
GLOBULIN SER-MCNC: 3.3 GM/DL (ref 2–3.9)
GLUCOSE SERPL-MCNC: 119 MG/DL (ref 70–115)
GLUCOSE UR QL STRIP: 100
GROUP & RH: NORMAL
HCT VFR BLD AUTO: 38.3 % (ref 36–48)
HGB BLD-MCNC: 12.2 G/DL (ref 11.8–16)
HGB UR QL STRIP: ABNORMAL
IMM GRANULOCYTES # BLD AUTO: 0.02 X10(3)/MCL (ref 0–0.03)
IMM GRANULOCYTES NFR BLD AUTO: 0.4 % (ref 0–0.5)
INDIRECT COOMBS: NORMAL
KETONES UR QL STRIP: NEGATIVE
LEUKOCYTE ESTERASE UR QL STRIP: NEGATIVE
LYMPHOCYTES # BLD AUTO: 1.58 X10(3)/MCL (ref 1.16–3.74)
LYMPHOCYTES NFR BLD AUTO: 30.4 % (ref 20–55)
MACROCYTES BLD QL SMEAR: ABNORMAL
MAGNESIUM SERPL-MCNC: 2.1 MG/DL (ref 1.8–2.4)
MCH RBC QN AUTO: 27.7 PG (ref 27–34)
MCHC RBC AUTO-ENTMCNC: 31.9 G/DL (ref 31–37)
MCV RBC AUTO: 87 FL (ref 79–99)
MICROCYTES BLD QL SMEAR: ABNORMAL
MONOCYTES # BLD AUTO: 0.75 X10(3)/MCL (ref 0.24–0.36)
MONOCYTES NFR BLD AUTO: 14.4 % (ref 4.7–12.5)
MUCOUS THREADS URNS QL MICRO: ABNORMAL /LPF
NEUTROPHILS # BLD AUTO: 2.83 X10(3)/MCL (ref 1.56–6.13)
NEUTROPHILS NFR BLD AUTO: 54.4 % (ref 37–73)
NITRITE UR QL STRIP: NEGATIVE
PH UR STRIP: 6.5 [PH]
PLATELET # BLD AUTO: 85 X10(3)/MCL (ref 140–371)
PLATELET # BLD EST: ABNORMAL 10*3/UL
PMV BLD AUTO: 10 FL (ref 9.4–12.4)
POTASSIUM SERPL-SCNC: 3 MMOL/L (ref 3.5–5.1)
PROT SERPL-MCNC: 7.5 GM/DL (ref 6.3–8.2)
PROT UR QL STRIP: NEGATIVE
RBC # BLD AUTO: 4.4 X10(6)/MCL (ref 4–5.1)
RBC #/AREA URNS AUTO: ABNORMAL /HPF
RBC MORPH BLD: ABNORMAL
SODIUM SERPL-SCNC: 142 MMOL/L (ref 136–145)
SP GR UR STRIP.AUTO: >=1.03 (ref 1–1.03)
SPECIMEN OUTDATE: NORMAL
SQUAMOUS #/AREA URNS AUTO: ABNORMAL /HPF
TSH SERPL-ACNC: 4.22 UIU/ML (ref 0.36–3.74)
UROBILINOGEN UR STRIP-ACNC: >=8
WBC # BLD AUTO: 5.2 X10(3)/MCL (ref 4–11.5)
WBC #/AREA URNS AUTO: ABNORMAL /HPF

## 2024-09-28 PROCEDURE — 85025 COMPLETE CBC W/AUTO DIFF WBC: CPT

## 2024-09-28 PROCEDURE — 25000003 PHARM REV CODE 250

## 2024-09-28 PROCEDURE — 93005 ELECTROCARDIOGRAM TRACING: CPT

## 2024-09-28 PROCEDURE — 83880 ASSAY OF NATRIURETIC PEPTIDE: CPT

## 2024-09-28 PROCEDURE — 81003 URINALYSIS AUTO W/O SCOPE: CPT

## 2024-09-28 PROCEDURE — 86850 RBC ANTIBODY SCREEN: CPT

## 2024-09-28 PROCEDURE — 83735 ASSAY OF MAGNESIUM: CPT

## 2024-09-28 PROCEDURE — 99285 EMERGENCY DEPT VISIT HI MDM: CPT | Mod: 25

## 2024-09-28 PROCEDURE — 96375 TX/PRO/DX INJ NEW DRUG ADDON: CPT

## 2024-09-28 PROCEDURE — 86901 BLOOD TYPING SEROLOGIC RH(D): CPT

## 2024-09-28 PROCEDURE — 84443 ASSAY THYROID STIM HORMONE: CPT

## 2024-09-28 PROCEDURE — 81015 MICROSCOPIC EXAM OF URINE: CPT

## 2024-09-28 PROCEDURE — 63600175 PHARM REV CODE 636 W HCPCS

## 2024-09-28 PROCEDURE — 96374 THER/PROPH/DIAG INJ IV PUSH: CPT

## 2024-09-28 PROCEDURE — 87077 CULTURE AEROBIC IDENTIFY: CPT

## 2024-09-28 PROCEDURE — 80053 COMPREHEN METABOLIC PANEL: CPT

## 2024-09-28 PROCEDURE — 86900 BLOOD TYPING SEROLOGIC ABO: CPT

## 2024-09-28 PROCEDURE — 93010 ELECTROCARDIOGRAM REPORT: CPT | Mod: ,,, | Performed by: INTERNAL MEDICINE

## 2024-09-28 RX ORDER — POTASSIUM CHLORIDE 20 MEQ/1
40 TABLET, EXTENDED RELEASE ORAL
Status: COMPLETED | OUTPATIENT
Start: 2024-09-28 | End: 2024-09-28

## 2024-09-28 RX ORDER — NITROFURANTOIN 25; 75 MG/1; MG/1
100 CAPSULE ORAL 2 TIMES DAILY
Qty: 10 CAPSULE | Refills: 0 | Status: SHIPPED | OUTPATIENT
Start: 2024-09-28 | End: 2024-10-03

## 2024-09-28 RX ORDER — HYDROMORPHONE HYDROCHLORIDE 2 MG/ML
2 INJECTION, SOLUTION INTRAMUSCULAR; INTRAVENOUS; SUBCUTANEOUS
Status: COMPLETED | OUTPATIENT
Start: 2024-09-28 | End: 2024-09-28

## 2024-09-28 RX ORDER — LORAZEPAM 2 MG/ML
1 INJECTION INTRAMUSCULAR
Status: COMPLETED | OUTPATIENT
Start: 2024-09-28 | End: 2024-09-28

## 2024-09-28 RX ADMIN — HYDROMORPHONE HYDROCHLORIDE 2 MG: 2 INJECTION, SOLUTION INTRAMUSCULAR; INTRAVENOUS; SUBCUTANEOUS at 07:09

## 2024-09-28 RX ADMIN — LORAZEPAM 1 MG: 2 INJECTION INTRAMUSCULAR; INTRAVENOUS at 07:09

## 2024-09-28 RX ADMIN — DEXTROSE MONOHYDRATE 2 G: 5 INJECTION INTRAVENOUS at 07:09

## 2024-09-28 RX ADMIN — POTASSIUM CHLORIDE 40 MEQ: 1500 TABLET, EXTENDED RELEASE ORAL at 08:09

## 2024-09-28 NOTE — ED PROVIDER NOTES
Encounter Date: 9/28/2024       History     Chief Complaint   Patient presents with    Leg Swelling     Reports that she has been having pain in her abd and back. Pt also noticed swelling in her feet this morning. Pt has cancer and called oncologist and was instructed to be seen in the ED.     Abdominal Pain    Back Pain     55yo F, well known to me, presents with lower abdominal and low back pain and BLE edema. The pain is chronic, but the edema is new. She has metastatic colon CA, and is on chemo. She takes morphine 15mg every 4hrs. Her recent labs (9/25/24) were reviewed.    The history is provided by the patient and a relative.     Review of patient's allergies indicates:   Allergen Reactions    Ketorolac Hives    Tramadol Hives    Vancomycin analogues Hives     Past Medical History:   Diagnosis Date    Brain tumor     Cervical ca     Depression     Malignant neoplasm of colon, unspecified     Opioid abuse      Past Surgical History:   Procedure Laterality Date    BRAIN SURGERY      CHOLECYSTECTOMY      COLONOSCOPY N/A 06/06/2024    Procedure: COLON;  Surgeon: Kevin Causey MD;  Location: [facility] ENDOSCOPY;  Service: Gastroenterology;  Laterality: N/A;    COLONOSCOPY, WITH 1 OR MORE BIOPSIES  06/06/2024    Procedure: COLONOSCOPY, WITH 1 OR MORE BIOPSIES;  Surgeon: Kevin Causey MD;  Location: [facility] ENDOSCOPY;  Service: Gastroenterology;;    COLONOSCOPY, WITH DIRECTED SUBMUCOSAL INJECTION  06/06/2024    Procedure: COLONOSCOPY, WITH DIRECTED SUBMUCOSAL INJECTION;  Surgeon: Kevin Causey MD;  Location: [facility] ENDOSCOPY;  Service: Gastroenterology;;    DIAGNOSTIC LAPAROSCOPY N/A 7/18/2024    Procedure: LAPAROSCOPY, DIAGNOSTIC;  Surgeon: Rubio Cast MD;  Location: [facility];  Service: General;  Laterality: N/A;  WITH LAP LIVER BIOPSY - ULTRASOUND GUIDED    EGD, WITH CLOSED BIOPSY  06/06/2024    Procedure: EGD, WITH CLOSED BIOPSY;  Surgeon: Kevin Causey MD;   Location: [facility] ENDOSCOPY;  Service: Gastroenterology;;    ESOPHAGOGASTRODUODENOSCOPY N/A 06/06/2024    Procedure: DOUBLE;  Surgeon: Kevin Causey MD;  Location: [facility] ENDOSCOPY;  Service: Gastroenterology;  Laterality: N/A;    HYSTERECTOMY      INSERTION OF TUNNELED CENTRAL VENOUS CATHETER (CVC) WITH SUBCUTANEOUS PORT N/A 7/18/2024    Procedure: [id number];  Surgeon: Rubio Cast MD;  Location: [facility];  Service: General;  Laterality: N/A;    KNEE SURGERY      THYROID LOBECTOMY       Family History   Problem Relation Name Age of Onset    Hypertension Mother      Hypertension Father      Stroke Father      Pancreatic cancer Brother      Cancer Paternal Grandfather       Social History     Tobacco Use    Smoking status: Never     Passive exposure: Never    Smokeless tobacco: Never   Substance Use Topics    Alcohol use: Never    Drug use: Not Currently     Types: Cocaine     Comment: 5-6 YEARS QUIT ON SUBOXONE     Review of Systems   Constitutional:  Negative for fever.   HENT:  Negative for sore throat.    Respiratory:  Negative for shortness of breath.    Cardiovascular:  Positive for leg swelling. Negative for chest pain.   Gastrointestinal:  Positive for abdominal pain. Negative for nausea.   Genitourinary:  Positive for flank pain and pelvic pain. Negative for dysuria.   Musculoskeletal:  Positive for back pain.   Skin:  Negative for rash.   Neurological:  Negative for weakness.   Hematological:  Does not bruise/bleed easily.   All other systems reviewed and are negative.      Physical Exam     Initial Vitals [09/28/24 1706]   BP Pulse Resp Temp SpO2   125/83 91 18 98.3 °F (36.8 °C) 97 %      MAP       --         Physical Exam    Nursing note and vitals reviewed.  Constitutional: Vital signs are normal. She appears well-developed and well-nourished. She is cooperative.   HENT:   Head: Normocephalic and atraumatic. Mouth/Throat: Oropharynx is clear and moist.   Eyes: Conjunctivae,  EOM and lids are normal. Pupils are equal, round, and reactive to light.   Neck: Trachea normal. Neck supple.   Normal range of motion.  Cardiovascular:  Normal rate, regular rhythm, normal heart sounds and intact distal pulses.           Pulmonary/Chest: Breath sounds normal.   Abdominal: Abdomen is soft. Bowel sounds are normal.   Musculoskeletal:         General: Edema present. Normal range of motion.      Cervical back: Normal, normal range of motion and neck supple.      Lumbar back: Normal.      Comments: 2+ BLE edema     Neurological: She is alert and oriented to person, place, and time. She has normal strength. Coordination normal.   Skin: Skin is warm, dry and intact. Capillary refill takes less than 2 seconds.   Psychiatric: She has a normal mood and affect. Her speech is normal and behavior is normal. Judgment and thought content normal. Cognition and memory are normal.         ED Course   Procedures  Labs Reviewed   COMPREHENSIVE METABOLIC PANEL - Abnormal       Result Value    Sodium 142      Potassium 3.0 (*)     Chloride 104      CO2 32      Glucose 119 (*)     Blood Urea Nitrogen 11      Creatinine 0.92      Calcium 10.5 (*)     Protein Total 7.5      Albumin 4.2      Globulin 3.3      Albumin/Globulin Ratio 1.3      Bilirubin Total 1.8 (*)      (*)     ALT 33      AST 59 (*)     eGFR 74      Anion Gap 6.0      BUN/Creatinine Ratio 12     NT-PRO NATRIURETIC PEPTIDE - Abnormal    ProBNP 303.0 (*)    URINALYSIS, REFLEX TO URINE CULTURE - Abnormal    Color, UA Yellow      Appearance, UA SL CLOUDY (*)     Specific Gravity, UA >=1.030      pH, UA 6.5      Protein, UA Negative      Glucose,  (*)     Ketones, UA Negative      Blood, UA Large (*)     Bilirubin, UA Small (*)     Urobilinogen, UA >=8.0 (*)     Nitrites, UA Negative      Leukocyte Esterase, UA Negative      Narrative:      URINE STABILITY IS 2 HOURS AT ROOM TEMP OR    SIX HOURS REFRIGERATED. PERFORMING TESTING ON    SPECIMENS  GREATER THAN THIS AGE MAY AFFECT THE    FOLLOWING TESTS:    PH          SPECIFIC GRAVITY           BLOOD    CLARITY     BILIRUBIN               UROBILINOGEN   TSH - Abnormal    TSH 4.220 (*)    CBC WITH DIFFERENTIAL - Abnormal    WBC 5.20      RBC 4.40      Hgb 12.2      Hct 38.3      MCV 87.0      MCH 27.7      MCHC 31.9      RDW 23.5 (*)     Platelet 85 (*)     MPV 10.0      Neut % 54.4      Lymph % 30.4      Mono % 14.4 (*)     Eos % 0.0 (*)     Basophil % 0.4      Lymph # 1.58      Neut # 2.83      Mono # 0.75 (*)     Eos # 0.00 (*)     Baso # 0.02      IG# 0.02      IG% 0.4     URINALYSIS, MICROSCOPIC - Abnormal    Bacteria, UA Moderate (*)     Mucous, UA Many (*)     RBC, UA 50-99 (*)     WBC, UA 6-10 (*)     Squamous Epithelial Cells, UA Many (*)    BLOOD SMEAR MICROSCOPIC EXAM (OLG) - Abnormal    RBC Morph Abnormal (*)     Anisocytosis 2+ (*)     Macrocytosis 2+ (*)     Microcytosis 1+ (*)     Platelets Decreased (*)    MAGNESIUM - Normal    Magnesium Level 2.10     CULTURE, URINE   CBC W/ AUTO DIFFERENTIAL    Narrative:     The following orders were created for panel order CBC auto differential.  Procedure                               Abnormality         Status                     ---------                               -----------         ------                     CBC with Differential[[id number]]       Abnormal            Final result                 Please view results for these tests on the individual orders.   TYPE & SCREEN    Group & Rh O POS      Indirect Noris GEL NEG      Specimen Outdate 10/01/2024 23:59          ECG Results              EKG 12-lead (Preliminary result)  Result time 09/28/24 18:12:38      Wet Read by Guido Nelson MD (09/28/24 18:12:38, [facility]    NSR, HR 89, nml intervals, nml axis, nml P-waves, nml QRS, nml ST-T waves, nml QT                                  Imaging Results              CT Abdomen Pelvis  Without  Contrast (Final result)  Result time 09/28/24 18:49:51      Final result by Joe Osuna MD (09/28/24 18:49:51)                   Impression:        1. Previously seen 8 mm nonobstructing calculus in the lower pole calyx of the left kidney.  I see no evidence of obstructive uropathy.    2.  Previously seen findings compatible with hepatic metastasis, atelectasis and or patchy consolidation involving the visible lower lung base, stranding and wall thickening involving the mid descending colonic region as well as other chronic and postsurgical findings.    n/a    CATEGORY: n/a    The following dose reduction techniques are used for all CT at [facility]:    1.   Automated exposure control.    2.   Adjustment of the mA and/or kV according to patient size.    3.   Use of iterative reconstruction technique.      Electronically signed by: Joe Osuna  Date:    09/28/2024  Time:    18:49               Narrative:    EXAMINATION:  CT ABDOMEN PELVIS WITHOUT CONTRAST    CLINICAL HISTORY:  Flank pain, kidney stone suspected;    TECHNIQUE:  Low dose axial images, sagittal and coronal reformations were obtained from the lung bases to the pubic symphysis.  30 mL of oral Omnipaque 350 was administered.    COMPARISON:  09/01/2024    FINDINGS:  Visible lung base: Partial visualization of previously seen left lower lobe nodules.    Liver:  Multiple previously seen low-density lesions throughout the hepatic parenchyma having a similar pattern to the prior study of 09/01/2024 including the partial calcification near the gallbladder fossa.    Gallbladder/Biliary System:  Compatible with a previous cholecystectomy.    Spleen:  No clinically significant abnormalities noted.    Adrenal glands:  No clinically significant abnormalities noted.    Pancreas:  Atrophic    Kidneys/Urinary Tract:  The left kidney again demonstrates a previously seen 8 mm radiopaque nonobstructing calculus in its lower pole calyx.  I see  no evidence of obstructive uropathy.    Urinary bladder:  No clinically significant abnormalities noted.    Prostate gland/uterus and ovaries:  Compatible with a previous hysterectomy.    GI tract:  The gastric lumen is filled with fluid and ingested material.  Previously seen stranding and wall thickening are again noted focally in the mid descending colonic region.  No definite evidence of perforation.    Vascular structures:  Mild atherosclerotic calcification involving the aorta and its primary branches.    Musculoskeletal structures:  Mild degenerative findings and bony demineralization.    Miscellaneous: Previously seen surgical drains are again noted in the abdomen.                                       Medications   cefTRIAXone (ROCEPHIN) 2 g in D5W 100 mL IVPB (MB+) (2 g Intravenous New Bag 9/28/24 1946)   HYDROmorphone (PF) injection 2 mg (2 mg Intravenous Given 9/28/24 1944)   LORazepam injection 1 mg (1 mg Intravenous Given 9/28/24 1945)   potassium chloride SA CR tablet 40 mEq (40 mEq Oral Given 9/28/24 2013)     Medical Decision Making  Metastatic colon CA, BLE edema, chronic lower abdominal and flank pain  Diff. Dx: anemia, CHF, liver failure, renal failure, exacerbation of chronic pan, occult infection  Analgesia  Labs, CT    Amount and/or Complexity of Data Reviewed  Labs: ordered.  Radiology: ordered.    Risk  Prescription drug management.               ED Course as of 09/28/24 2128   Sat Sep 28, 2024   1854 CT Abdomen Pelvis  Without Contrast  There are no acute findings, or changes from previous. [TM]   2003 Urinalysis, Microscopic(!)  Consistent with a UTI [TM]   2003 NT-Pro Natriuretic Peptide(!)  Very low BNP [TM]   2003 Comprehensive metabolic panel(!)  Potassium improved versus 3 days ago [TM]      ED Course User Index  [TM] Guido Nelson MD                           Clinical Impression:  Final diagnoses:  [R60.9] Swelling  [R10.31, G89.29, R10.32] Chronic bilateral lower abdominal pain  (Primary)  [R60.0] Mild peripheral edema  [N30.01] Acute cystitis with hematuria          ED Disposition Condition    Discharge Stable          ED Prescriptions       Medication Sig Dispense Start Date End Date Auth. Provider    nitrofurantoin, macrocrystal-monohydrate, (MACROBID) 100 MG capsule Take 1 capsule (100 mg total) by mouth 2 (two) times daily. for 5 days 10 capsule 9/28/2024 10/3/2024 Guido Nelson MD          Follow-up Information       Follow up With Specialties Details Why Contact Info    Amado Baig III, MD Family Medicine Call in 2 days  [address] [id number]  [phone]               Guido Nelson MD  09/28/24 3819

## 2024-09-30 LAB
OHS QRS DURATION: 80 MS
OHS QTC CALCULATION: 459 MS

## 2024-10-01 ENCOUNTER — OFFICE VISIT (OUTPATIENT)
Dept: HEMATOLOGY/ONCOLOGY | Facility: CLINIC | Age: 54
End: 2024-10-01
Payer: MEDICARE

## 2024-10-01 ENCOUNTER — INFUSION (OUTPATIENT)
Facility: HOSPITAL | Age: 54
End: 2024-10-01
Attending: STUDENT IN AN ORGANIZED HEALTH CARE EDUCATION/TRAINING PROGRAM
Payer: MEDICARE

## 2024-10-01 ENCOUNTER — LAB VISIT (OUTPATIENT)
Dept: LAB | Facility: HOSPITAL | Age: 54
End: 2024-10-01
Payer: MEDICARE

## 2024-10-01 VITALS
SYSTOLIC BLOOD PRESSURE: 116 MMHG | BODY MASS INDEX: 20.93 KG/M2 | TEMPERATURE: 98 F | RESPIRATION RATE: 18 BRPM | HEIGHT: 70 IN | WEIGHT: 146.19 LBS | HEART RATE: 72 BPM | DIASTOLIC BLOOD PRESSURE: 73 MMHG | OXYGEN SATURATION: 100 %

## 2024-10-01 DIAGNOSIS — C78.7 METASTASIS TO LIVER: ICD-10-CM

## 2024-10-01 DIAGNOSIS — D69.59 CHEMOTHERAPY-INDUCED THROMBOCYTOPENIA: ICD-10-CM

## 2024-10-01 DIAGNOSIS — C18.9 COLON ADENOCARCINOMA: ICD-10-CM

## 2024-10-01 DIAGNOSIS — C78.00 MALIGNANT NEOPLASM METASTATIC TO LUNG, UNSPECIFIED LATERALITY: ICD-10-CM

## 2024-10-01 DIAGNOSIS — C18.9 COLON ADENOCARCINOMA: Primary | ICD-10-CM

## 2024-10-01 DIAGNOSIS — G89.3 CANCER RELATED PAIN: ICD-10-CM

## 2024-10-01 DIAGNOSIS — E87.6 HYPOKALEMIA: ICD-10-CM

## 2024-10-01 DIAGNOSIS — Z79.899 ON ANTINEOPLASTIC CHEMOTHERAPY: ICD-10-CM

## 2024-10-01 DIAGNOSIS — T45.1X5A CHEMOTHERAPY-INDUCED THROMBOCYTOPENIA: ICD-10-CM

## 2024-10-01 LAB
ALBUMIN SERPL-MCNC: 3.7 G/DL (ref 3.4–5)
ALBUMIN/GLOB SERPL: 1.2 RATIO
ALP SERPL-CCNC: 644 UNIT/L (ref 50–144)
ALT SERPL-CCNC: 23 UNIT/L (ref 1–45)
ANION GAP SERPL CALC-SCNC: 7 MEQ/L (ref 2–13)
ANISOCYTOSIS BLD QL SMEAR: ABNORMAL
AST SERPL-CCNC: 49 UNIT/L (ref 14–36)
BACTERIA UR CULT: NORMAL
BASOPHILS # BLD AUTO: 0.01 X10(3)/MCL (ref 0.01–0.08)
BASOPHILS NFR BLD AUTO: 0.3 % (ref 0.1–1.2)
BILIRUB SERPL-MCNC: 1.3 MG/DL (ref 0–1)
BUN SERPL-MCNC: 10 MG/DL (ref 7–20)
CALCIUM SERPL-MCNC: 9.9 MG/DL (ref 8.4–10.2)
CEA SERPL-MCNC: 574.74 NG/ML (ref 0–3)
CHLORIDE SERPL-SCNC: 102 MMOL/L (ref 98–110)
CO2 SERPL-SCNC: 30 MMOL/L (ref 21–32)
CREAT SERPL-MCNC: 0.7 MG/DL (ref 0.66–1.25)
CREAT/UREA NIT SERPL: 14 (ref 12–20)
EOSINOPHIL # BLD AUTO: 0 X10(3)/MCL (ref 0.04–0.36)
EOSINOPHIL NFR BLD AUTO: 0 % (ref 0.7–7)
ERYTHROCYTE [DISTWIDTH] IN BLOOD BY AUTOMATED COUNT: 23 % (ref 11–14.5)
GFR SERPLBLD CREATININE-BSD FMLA CKD-EPI: >90 ML/MIN/1.73/M2
GLOBULIN SER-MCNC: 3 GM/DL (ref 2–3.9)
GLUCOSE SERPL-MCNC: 161 MG/DL (ref 70–115)
HCT VFR BLD AUTO: 38.7 % (ref 36–48)
HGB BLD-MCNC: 12.1 G/DL (ref 11.8–16)
IMM GRANULOCYTES # BLD AUTO: 0.02 X10(3)/MCL (ref 0–0.03)
IMM GRANULOCYTES NFR BLD AUTO: 0.5 % (ref 0–0.5)
LYMPHOCYTES # BLD AUTO: 0.85 X10(3)/MCL (ref 1.16–3.74)
LYMPHOCYTES NFR BLD AUTO: 22 % (ref 20–55)
MACROCYTES BLD QL SMEAR: ABNORMAL
MCH RBC QN AUTO: 27.4 PG (ref 27–34)
MCHC RBC AUTO-ENTMCNC: 31.3 G/DL (ref 31–37)
MCV RBC AUTO: 87.8 FL (ref 79–99)
MICROCYTES BLD QL SMEAR: ABNORMAL
MONOCYTES # BLD AUTO: 0.4 X10(3)/MCL (ref 0.24–0.36)
MONOCYTES NFR BLD AUTO: 10.3 % (ref 4.7–12.5)
NEUTROPHILS # BLD AUTO: 2.59 X10(3)/MCL (ref 1.56–6.13)
NEUTROPHILS NFR BLD AUTO: 66.9 % (ref 37–73)
PLATELET # BLD AUTO: 62 X10(3)/MCL (ref 140–371)
PLATELET # BLD EST: ABNORMAL 10*3/UL
PMV BLD AUTO: 9.4 FL (ref 9.4–12.4)
POTASSIUM SERPL-SCNC: 3.2 MMOL/L (ref 3.5–5.1)
PROT SERPL-MCNC: 6.7 GM/DL (ref 6.3–8.2)
RBC # BLD AUTO: 4.41 X10(6)/MCL (ref 4–5.1)
RBC MORPH BLD: ABNORMAL
SODIUM SERPL-SCNC: 139 MMOL/L (ref 136–145)
WBC # BLD AUTO: 3.87 X10(3)/MCL (ref 4–11.5)

## 2024-10-01 PROCEDURE — 85025 COMPLETE CBC W/AUTO DIFF WBC: CPT

## 2024-10-01 PROCEDURE — 36415 COLL VENOUS BLD VENIPUNCTURE: CPT

## 2024-10-01 PROCEDURE — 82378 CARCINOEMBRYONIC ANTIGEN: CPT

## 2024-10-01 PROCEDURE — 80053 COMPREHEN METABOLIC PANEL: CPT

## 2024-10-01 RX ORDER — CAPECITABINE 500 MG/1
1000 TABLET, FILM COATED ORAL 2 TIMES DAILY
Qty: 56 TABLET | Refills: 3 | Status: ACTIVE | OUTPATIENT
Start: 2024-10-01 | End: 2024-11-26

## 2024-10-01 NOTE — NURSING
Pt reports no treatment today, discharged home in stable condition with sister at side. Pt will check portal for next appts.

## 2024-10-01 NOTE — PROGRESS NOTES
Subjective:       Patient ID: Shira Nair is a 54 y.o. female.    Chief Complaint: New Patient    Diagnosis: Metastatic Colon Adenocarcinoma - mets to liver and lung    Current Treatment:  Xelox started 8/12/2024    Treatment History: FOLFOX q2w x 12 cycles-- to start 7/29/24    HPI  53yo F presented in June '24 for evaluation of liver metastases. She presented to OSH in May '24 with 1 month of abdominal pain. CT A/P at that time revealed numerous hepatic lesions, largest measuring 5.8 x 7cm, concerning for metastatic disease. CT Chest revealed numerous new bilateral pulmonary nodules measuring 3-4mm in size of indeterminate etiology. IR liver biopsy was done which was completely necrotic. An egd and colonoscopy done with Dr. Jones in June '24 revealed a severely stenotic malignant lesion in the mid descending colon that was unable to be traversed. Biopsy was taken which revealed at least high grade dysplasia suspicious for adenocarcinoma. Her CEA at time of imaging/workup was 546. She has a history of cervical cancer about 15 years ago, she had a total hysterectomy for this. She has a history of a benign brain tumor that required  shunt placed in the 1980s. She also had benign thyroid tumors removed about 8 years prior to presentation.     She underwent mediport placement and liver biopsy with ex lap with Dr. Cast on 7/18/24. Liver biopsy returned positive for metastatic colorectal adenocarcinoma. Will plan to proceed with 1st line FOLFOX with 3rd agent pending NGS studies.     Interval History:  She returns to the clinic today for C3 Xelox via phone call, sister on phone as well. She did get C1 FOLFOX on 7/29/2024, reported that her dog chewed through 5FU pump cord.  She feels well today and feels that she tolerated treatment well. Due her history of drug use, a drug screen was performed which was positive for fentanyl. Results were discussed in detail with her. Results discussed with Dr. LeJeune  and decision was made to change form FOLFOX to Xelox. She denies any nausea, vomiting, diarrhea, constipation, SOB, CP, headache, or swelling. She had an ER visit on 9/28/2024 for abdominal pain and UTI. She is followed by palliative care, she is taking MS Contin  ER 15 mg BID and MS contin 15 mg immediate release as needed for breakthrough pain. She does report mild neuropathy to bilateral finger tips, grade 1. She does take lactulose for constipation. She reports she is feeling great and is able to play with her granddaughter. Labs reviewed with patient in detail, platelet count 62. Will hold treatment for 2 weeks.         Past Medical History:   Diagnosis Date    Brain tumor     Cervical ca     Depression     Malignant neoplasm of colon, unspecified     Opioid abuse       Past Surgical History:   Procedure Laterality Date    BRAIN SURGERY      CHOLECYSTECTOMY      COLONOSCOPY N/A 06/06/2024    Procedure: COLON;  Surgeon: Kevin Causey MD;  Location: [facility] ENDOSCOPY;  Service: Gastroenterology;  Laterality: N/A;    COLONOSCOPY, WITH 1 OR MORE BIOPSIES  06/06/2024    Procedure: COLONOSCOPY, WITH 1 OR MORE BIOPSIES;  Surgeon: Kevin Causey MD;  Location: [facility] ENDOSCOPY;  Service: Gastroenterology;;    COLONOSCOPY, WITH DIRECTED SUBMUCOSAL INJECTION  06/06/2024    Procedure: COLONOSCOPY, WITH DIRECTED SUBMUCOSAL INJECTION;  Surgeon: Kevin Causey MD;  Location: [facility] ENDOSCOPY;  Service: Gastroenterology;;    DIAGNOSTIC LAPAROSCOPY N/A 7/18/2024    Procedure: LAPAROSCOPY, DIAGNOSTIC;  Surgeon: Rubio Cast MD;  Location: [facility];  Service: General;  Laterality: N/A;  WITH LAP LIVER BIOPSY - ULTRASOUND GUIDED    EGD, WITH CLOSED BIOPSY  06/06/2024    Procedure: EGD, WITH CLOSED BIOPSY;  Surgeon: Kevin Causey MD;  Location: [facility] ENDOSCOPY;  Service: Gastroenterology;;    ESOPHAGOGASTRODUODENOSCOPY N/A 06/06/2024    Procedure: DOUBLE;  Surgeon: Marium  Kevin CURTIS MD;  Location: [facility] ENDOSCOPY;  Service: Gastroenterology;  Laterality: N/A;    HYSTERECTOMY      INSERTION OF TUNNELED CENTRAL VENOUS CATHETER (CVC) WITH SUBCUTANEOUS PORT N/A 7/18/2024    Procedure: [id number];  Surgeon: Rubio Cast MD;  Location: [facility];  Service: General;  Laterality: N/A;    KNEE SURGERY      THYROID LOBECTOMY       Social History     Socioeconomic History    Marital status: Single   Tobacco Use    Smoking status: Never     Passive exposure: Never    Smokeless tobacco: Never   Substance and Sexual Activity    Alcohol use: Never    Drug use: Not Currently     Types: Cocaine     Comment: 5-6 YEARS QUIT ON SUBOXONE      Family History   Problem Relation Name Age of Onset    Hypertension Mother      Hypertension Father      Stroke Father      Pancreatic cancer Brother      Cancer Paternal Grandfather        Review of patient's allergies indicates:   Allergen Reactions    Ketorolac Hives    Tramadol Hives    Vancomycin analogues Hives      Review of Systems   Constitutional:  Negative for activity change, fatigue, fever and unexpected weight change.   HENT:  Negative for sore throat.    Eyes:  Negative for visual disturbance.   Respiratory:  Negative for cough and shortness of breath.    Cardiovascular:  Negative for chest pain.   Gastrointestinal:  Negative for abdominal pain, diarrhea, nausea and vomiting.   Endocrine: Negative for polyuria.   Genitourinary:  Negative for dysuria and hot flashes.   Integumentary:  Negative for rash.   Allergic/Immunologic: Negative for immunocompromised state.   Neurological:  Negative for weakness and headaches.   Hematological:  Negative for adenopathy.   Psychiatric/Behavioral:  Negative for confusion.          Objective:      There were no vitals filed for this visit.        Physical Exam  Constitutional:       General: She is not in acute distress.     Appearance: Normal appearance. She is not ill-appearing.   HENT:      Head:  Normocephalic and atraumatic.      Nose: Nose normal.      Mouth/Throat:      Mouth: Mucous membranes are moist.      Pharynx: Oropharynx is clear.   Eyes:      Extraocular Movements: Extraocular movements intact.      Conjunctiva/sclera: Conjunctivae normal.      Pupils: Pupils are equal, round, and reactive to light.   Cardiovascular:      Rate and Rhythm: Normal rate and regular rhythm.      Pulses: Normal pulses.      Heart sounds: Normal heart sounds. No murmur heard.  Pulmonary:      Effort: Pulmonary effort is normal. No respiratory distress.      Breath sounds: Normal breath sounds.   Abdominal:      General: There is no distension.      Palpations: Abdomen is soft.      Tenderness: There is no abdominal tenderness.   Musculoskeletal:         General: Normal range of motion.      Cervical back: Normal range of motion and neck supple.      Right lower leg: No edema.      Left lower leg: No edema.   Lymphadenopathy:      Cervical: No cervical adenopathy.   Skin:     General: Skin is warm and dry.   Neurological:      General: No focal deficit present.      Mental Status: She is alert and oriented to person, place, and time.         LABS AND IMAGING REVIEWED IN EPIC  1/3/24 CT Abd/Pelvis:  1. Postoperative changes are present compatible with a previous cholecystectomy. An elliptical calcification is noted within the gallbladder fossa and I suspect represents chronic calcification of a postoperative hematoma.  Clinical correlation is recommended.  2. Left-sided, nonobstructing nephrolithiasis as described above.  3. Chronic changes are present as described above.  See above comments.  5/13/24 CT Abd/Pelvis:  Changes most consistent with metastatic disease to the liver.   5/14/24 CT Chest:  Small indeterminate pulmonary nodules which were not seen in 2017. These can be followed on surveillance scans.   6/27/24 PET/CT:  1. Hepatic extensive metastatic involvement with hypermetabolic masses.  2. Descending colon  concentric mural thickening and hypermetabolism is suggested to be the focus of colon carcinoma.  7/23/24 CT Abd/Pelvis:  1. A few subcentimeter non- calcified nodules are seen in the anterior and lateral middle lobe as well as posterior lower lobe, some of which are seen stable in the prior study. This are of concern for metastasis.   2. Multiple hypoenhancing lesions are again seen in both hepatic lobes with subtle decreased in size in the aggregate of lesions in the anterior right hepatic lobe measuring 5.9 x 5 cm (AP x T) on series 3 image 29. These are ascribed to metastatic lesions.   3. There is stable 6 cm long annular wall thickening along the descending colon centered on series 6 image 32. This is consistent with the known primary colon malignancy.   4. Details and other findings as discussed above.  9/1/24 CT Abd/Pelvis:  1. Acute descending colonic diverticulitis without gross perforation or abscess formation.  2. Questionable punctate distal right ureteral stone near the ureterovesicular junction without hydronephrosis or hydroureter.  3. Multifocal hypoattenuating liver lesions with similar burden of disease compared to prior CT from 07/29/2024.  4. Unchanged multiple 4-5 mm lung base nodules.  5. Nonobstructing 9 mm left lower pole renal stone.  6. Additional findings as above.   9/28/24 CT Abd/Pelvis:  1. Previously seen 8 mm nonobstructing calculus in the lower pole calyx of the left kidney.  I see no evidence of obstructive uropathy.  2.  Previously seen findings compatible with hepatic metastasis, atelectasis and or patchy consolidation involving the visible lower lung base, stranding and wall thickening involving the mid descending colonic region as well as other chronic and postsurgical findings.         Pathology:  6/6/24 EGD/Colonoscopy Biopsy:  1. STOMACH BIOPSY:    -MODERATE CHRONIC GASTRITIS, FOCALLY ACTIVE (SEE COMMENT).    -NO EVIDENCE OF DYSPLASIA OR MALIGNANCY.   2. COLON STRICTURE  BIOPSY:    -FRAGMENTS OF COLONIC-TYPE MUCOSA WITH AT LEAST HIGH GRADE DYSPLASIA, SUSPICIOUS   FOR ADENOCARCINOMA (SEE COMMENTS).   7/18/24 Laparoscopy:  1. PERITONEUM, BIOPSY:    -MESOTHELIUM-LINED MEMBRANOUS FIBROADIPOSE TISSUE. NO NEOPLASM.   2. LEFT LOBE OF LIVER, WEDGE BIOPSY:    -METASTATIC COLORECTAL ADENOCARCINOMA.     Assessment:   Metastatic Colorectal Adenocarcinoma - mets to liver, extensive and unresectable.   PET/CT 6/27/24 revealed hepatic extensive metastatic involvement with hypermetabolic masses and descending colon  Per Dr. Cast no role for diversion at this time given no symptoms. Colon cancer of the descending colon with unresectable liver metastasis, relatively bulky disease within the liver.   Laparoscopy and mediport placement performed 7/18/24, liver with metastatic colorectal adenocarcinoma, NGS pending  Plan for FOLFOX q2w x 12 cycles, to start 7/29  Dog chewed through pump cord for C1, drug screen positive for fentynal and opiates. Will switch to xelox at this time.         Plan:   Hold C3 XELOX x 2 weeks due to thrombocytopenia  Reduce oxaliplatin to 110 mg/m2 and Xeloda to 1000 mg BID (discussed with Dr. Lejeune)  Increase oral potassium to twice daily  Xeloda 1000 mg BID days 1-14/21 with oxali q3w.    Continue follow up with pain management  Repeat CMP in 1 week to recheck potassium. May need IV potassium  RTC in 2 weeks with NP for in person visit and labs/C3 XELOX in [place]  Cbc, cmp, cea- 1 hr prior @ [facility]    I spent a total of 40 minutes on the day of the visit.This includes face to face time and non-face to face time preparing to see the patient (eg, review of tests), obtaining and/or reviewing separately obtained history, documenting clinical information in the electronic or other health record, independently interpreting results and communicating results to the patient/family/caregiver, or care coordinator.    Suggested dose modifications for toxicity:   Myelotoxicity The  treatment cycle should be delayed one week if the total WBC count is <3000/microL, ANC is <1500/microL, or the platelet count is <100,000/microL on day 1. If treatment is delayed for two weeks or delayed for one week on two separate occasions, reduce the doses of oxaliplatin and capecitabine by 10 to 20%. Subsequent treatment cycles should be delayed until neutrophils are >=1500/microL and platelets are >=75,000/microL.           AZALEA Briggs-STEVE  Oncology/Hematology  [facility]              audiovisit:  The patient location is: [place]  The chief complaint leading to consultation is: treatment clearance   Visit type: Virtual visit with audio only (telephone)  Total time spent with patient: 10 minutes         The reason for the audio only service rather than synchronous audio and video virtual visit was related to technical difficulties or patient preference/necessity.     Each patient to whom I provide medical services by telemedicine is:  (1) informed of the relationship between the physician and patient and the respective role of any other health care provider with respect to management of the patient; and (2) notified that they may decline to receive medical services by telemedicine and may withdraw from such care at any time. Patient verbally consented to receive this service via voice-only telephone call.

## 2024-10-05 ENCOUNTER — HOSPITAL ENCOUNTER (EMERGENCY)
Facility: HOSPITAL | Age: 54
Discharge: HOME | End: 2024-10-05
Attending: FAMILY MEDICINE
Payer: MEDICARE

## 2024-10-05 VITALS
HEIGHT: 70 IN | DIASTOLIC BLOOD PRESSURE: 87 MMHG | RESPIRATION RATE: 17 BRPM | OXYGEN SATURATION: 99 % | WEIGHT: 145 LBS | HEART RATE: 81 BPM | BODY MASS INDEX: 20.76 KG/M2 | SYSTOLIC BLOOD PRESSURE: 125 MMHG | TEMPERATURE: 99 F

## 2024-10-05 DIAGNOSIS — R07.9 CHEST PAIN: Primary | ICD-10-CM

## 2024-10-05 LAB
ALBUMIN SERPL-MCNC: 4 G/DL (ref 3.4–5)
ALBUMIN/GLOB SERPL: 1.1 RATIO
ALP SERPL-CCNC: 850 UNIT/L (ref 50–144)
ALT SERPL-CCNC: 26 UNIT/L (ref 1–45)
ANION GAP SERPL CALC-SCNC: 6 MEQ/L (ref 2–13)
AST SERPL-CCNC: 46 UNIT/L (ref 14–36)
BASOPHILS # BLD AUTO: 0.02 X10(3)/MCL (ref 0.01–0.08)
BASOPHILS NFR BLD AUTO: 0.4 % (ref 0.1–1.2)
BILIRUB SERPL-MCNC: 1.5 MG/DL (ref 0–1)
BNP BLD-MCNC: 504 PG/ML (ref 0–124.9)
BUN SERPL-MCNC: 13 MG/DL (ref 7–20)
CALCIUM SERPL-MCNC: 10.3 MG/DL (ref 8.4–10.2)
CHLORIDE SERPL-SCNC: 103 MMOL/L (ref 98–110)
CO2 SERPL-SCNC: 31 MMOL/L (ref 21–32)
CREAT SERPL-MCNC: 0.87 MG/DL (ref 0.66–1.25)
CREAT/UREA NIT SERPL: 15 (ref 12–20)
EOSINOPHIL # BLD AUTO: 0 X10(3)/MCL (ref 0.04–0.36)
EOSINOPHIL NFR BLD AUTO: 0 % (ref 0.7–7)
ERYTHROCYTE [DISTWIDTH] IN BLOOD BY AUTOMATED COUNT: 21.6 % (ref 11–14.5)
GFR SERPLBLD CREATININE-BSD FMLA CKD-EPI: 79 ML/MIN/1.73/M2
GLOBULIN SER-MCNC: 3.5 GM/DL (ref 2–3.9)
GLUCOSE SERPL-MCNC: 121 MG/DL (ref 70–115)
HCT VFR BLD AUTO: 32.7 % (ref 36–48)
HGB BLD-MCNC: 10.6 G/DL (ref 11.8–16)
IMM GRANULOCYTES # BLD AUTO: 0.02 X10(3)/MCL (ref 0–0.03)
IMM GRANULOCYTES NFR BLD AUTO: 0.4 % (ref 0–0.5)
LYMPHOCYTES # BLD AUTO: 1.26 X10(3)/MCL (ref 1.16–3.74)
LYMPHOCYTES NFR BLD AUTO: 23.3 % (ref 20–55)
MAGNESIUM SERPL-MCNC: 1.9 MG/DL (ref 1.8–2.4)
MCH RBC QN AUTO: 28 PG (ref 27–34)
MCHC RBC AUTO-ENTMCNC: 32.4 G/DL (ref 31–37)
MCV RBC AUTO: 86.5 FL (ref 79–99)
MONOCYTES # BLD AUTO: 0.62 X10(3)/MCL (ref 0.24–0.36)
MONOCYTES NFR BLD AUTO: 11.5 % (ref 4.7–12.5)
NEUTROPHILS # BLD AUTO: 3.48 X10(3)/MCL (ref 1.56–6.13)
NEUTROPHILS NFR BLD AUTO: 64.4 % (ref 37–73)
NRBC BLD AUTO-RTO: 0 %
PLATELET # BLD AUTO: 126 X10(3)/MCL (ref 140–371)
PMV BLD AUTO: 9.4 FL (ref 9.4–12.4)
POTASSIUM SERPL-SCNC: 2.9 MMOL/L (ref 3.5–5.1)
PROT SERPL-MCNC: 7.5 GM/DL (ref 6.3–8.2)
RBC # BLD AUTO: 3.78 X10(6)/MCL (ref 4–5.1)
SODIUM SERPL-SCNC: 140 MMOL/L (ref 136–145)
TROPONIN I SERPL-MCNC: <0.012 NG/ML (ref 0–0.03)
TROPONIN I SERPL-MCNC: <0.012 NG/ML (ref 0–0.03)
WBC # BLD AUTO: 5.4 X10(3)/MCL (ref 4–11.5)

## 2024-10-05 PROCEDURE — 25000003 PHARM REV CODE 250: Performed by: FAMILY MEDICINE

## 2024-10-05 PROCEDURE — 93010 ELECTROCARDIOGRAM REPORT: CPT | Mod: ,,, | Performed by: INTERNAL MEDICINE

## 2024-10-05 PROCEDURE — 83735 ASSAY OF MAGNESIUM: CPT | Performed by: FAMILY MEDICINE

## 2024-10-05 PROCEDURE — 83880 ASSAY OF NATRIURETIC PEPTIDE: CPT | Performed by: FAMILY MEDICINE

## 2024-10-05 PROCEDURE — 84484 ASSAY OF TROPONIN QUANT: CPT | Performed by: FAMILY MEDICINE

## 2024-10-05 PROCEDURE — 99285 EMERGENCY DEPT VISIT HI MDM: CPT | Mod: 25

## 2024-10-05 PROCEDURE — 85025 COMPLETE CBC W/AUTO DIFF WBC: CPT | Performed by: FAMILY MEDICINE

## 2024-10-05 PROCEDURE — 93005 ELECTROCARDIOGRAM TRACING: CPT

## 2024-10-05 PROCEDURE — 80053 COMPREHEN METABOLIC PANEL: CPT | Performed by: FAMILY MEDICINE

## 2024-10-05 RX ORDER — POTASSIUM CHLORIDE 20 MEQ/1
40 TABLET, EXTENDED RELEASE ORAL
Status: COMPLETED | OUTPATIENT
Start: 2024-10-05 | End: 2024-10-05

## 2024-10-05 RX ADMIN — POTASSIUM CHLORIDE 40 MEQ: 1500 TABLET, EXTENDED RELEASE ORAL at 07:10

## 2024-10-06 NOTE — ED PROVIDER NOTES
Encounter Date: 10/5/2024       History     Chief Complaint   Patient presents with    Chest Pain     Started around 1 pm. Goes up into left shoulder and down left arm. No cardia Hx. Does have Hx of Cancer.      Patient presents for evaluation of chest pain.  Patient notes having brief episode at a fair today of sharp chest pain and left-sided radiated to left shoulder.  Symptoms were sharp and intense briefly for proximally 30 minutes and if since resolved.  At present emergency room patient is without pain.  Patient denies having any other associated symptoms at present.        Review of patient's allergies indicates:   Allergen Reactions    Ketorolac Hives    Tramadol Hives    Vancomycin analogues Hives     Past Medical History:   Diagnosis Date    Brain tumor     Cervical ca     Depression     Malignant neoplasm of colon, unspecified     Opioid abuse      Past Surgical History:   Procedure Laterality Date    BRAIN SURGERY      CHOLECYSTECTOMY      COLONOSCOPY N/A 06/06/2024    Procedure: COLON;  Surgeon: Kevin Causey MD;  Location: [facility] ENDOSCOPY;  Service: Gastroenterology;  Laterality: N/A;    COLONOSCOPY, WITH 1 OR MORE BIOPSIES  06/06/2024    Procedure: COLONOSCOPY, WITH 1 OR MORE BIOPSIES;  Surgeon: Kevin Causey MD;  Location: [facility] ENDOSCOPY;  Service: Gastroenterology;;    COLONOSCOPY, WITH DIRECTED SUBMUCOSAL INJECTION  06/06/2024    Procedure: COLONOSCOPY, WITH DIRECTED SUBMUCOSAL INJECTION;  Surgeon: Kevin Causey MD;  Location: [facility] ENDOSCOPY;  Service: Gastroenterology;;    DIAGNOSTIC LAPAROSCOPY N/A 7/18/2024    Procedure: LAPAROSCOPY, DIAGNOSTIC;  Surgeon: Rubio Cast MD;  Location: [facility] OR;  Service: General;  Laterality: N/A;  WITH LAP LIVER BIOPSY - ULTRASOUND GUIDED    EGD, WITH CLOSED BIOPSY  06/06/2024    Procedure: EGD, WITH CLOSED BIOPSY;  Surgeon: Kevin Causey MD;  Location: [facility] ENDOSCOPY;  Service: Gastroenterology;;     ESOPHAGOGASTRODUODENOSCOPY N/A 06/06/2024    Procedure: DOUBLE;  Surgeon: Kevin Causey MD;  Location: [facility] ENDOSCOPY;  Service: Gastroenterology;  Laterality: N/A;    HYSTERECTOMY      INSERTION OF TUNNELED CENTRAL VENOUS CATHETER (CVC) WITH SUBCUTANEOUS PORT N/A 7/18/2024    Procedure: [id number];  Surgeon: Rubio Cast MD;  Location: [facility] OR;  Service: General;  Laterality: N/A;    KNEE SURGERY      THYROID LOBECTOMY       Family History   Problem Relation Name Age of Onset    Hypertension Mother      Hypertension Father      Stroke Father      Pancreatic cancer Brother      Cancer Paternal Grandfather       Social History     Tobacco Use    Smoking status: Never     Passive exposure: Never    Smokeless tobacco: Never   Substance Use Topics    Alcohol use: Never    Drug use: Not Currently     Types: Cocaine     Comment: 5-6 YEARS QUIT ON SUBOXONE     Review of Systems   Constitutional: Negative.    HENT: Negative.     Eyes: Negative.    Respiratory: Negative.     Cardiovascular:  Positive for chest pain.   Gastrointestinal: Negative.    Endocrine: Negative.    Genitourinary: Negative.    Musculoskeletal: Negative.    Skin: Negative.    Allergic/Immunologic: Negative.    Neurological: Negative.    Hematological: Negative.    Psychiatric/Behavioral: Negative.         Physical Exam     Initial Vitals [10/05/24 1918]   BP Pulse Resp Temp SpO2   124/70 87 16 98.4 °F (36.9 °C) 97 %      MAP       --         Physical Exam    Vitals reviewed.  Constitutional: She appears well-developed and well-nourished. She is not diaphoretic. No distress.   HENT:   Head: Normocephalic and atraumatic. Mouth/Throat: No oropharyngeal exudate.   Eyes: EOM are normal. Pupils are equal, round, and reactive to light. Right eye exhibits no discharge. Left eye exhibits no discharge. No scleral icterus.   Neck: Neck supple. No thyromegaly present. No tracheal deviation present. No JVD present.   Normal range of  motion.  Cardiovascular:  Normal rate, regular rhythm and normal heart sounds.     Exam reveals no gallop and no friction rub.       No murmur heard.  Pulmonary/Chest: Breath sounds normal. No stridor. No respiratory distress. She has no wheezes. She has no rhonchi. She has no rales.   Abdominal: Abdomen is soft. Bowel sounds are normal. She exhibits no distension. There is no abdominal tenderness. There is no rebound and no guarding.   Musculoskeletal:         General: No tenderness or edema. Normal range of motion.      Cervical back: Normal range of motion and neck supple.     Neurological: She is alert and oriented to person, place, and time. She has normal reflexes. She displays normal reflexes. No cranial nerve deficit or sensory deficit. GCS score is 15. GCS eye subscore is 4. GCS verbal subscore is 5. GCS motor subscore is 6.   Skin: Skin is warm. Capillary refill takes less than 2 seconds.   Psychiatric: She has a normal mood and affect.         ED Course   Procedures  Labs Reviewed   COMPREHENSIVE METABOLIC PANEL - Abnormal       Result Value    Sodium 140      Potassium 2.9 (*)     Chloride 103      CO2 31      Glucose 121 (*)     Blood Urea Nitrogen 13      Creatinine 0.87      Calcium 10.3 (*)     Protein Total 7.5      Albumin 4.0      Globulin 3.5      Albumin/Globulin Ratio 1.1      Bilirubin Total 1.5 (*)      (*)     ALT 26      AST 46 (*)     eGFR 79      Anion Gap 6.0      BUN/Creatinine Ratio 15     NT-PRO NATRIURETIC PEPTIDE - Abnormal    ProBNP 504.0 (*)    CBC WITH DIFFERENTIAL - Abnormal    WBC 5.40      RBC 3.78 (*)     Hgb 10.6 (*)     Hct 32.7 (*)     MCV 86.5      MCH 28.0      MCHC 32.4      RDW 21.6 (*)     Platelet 126 (*)     MPV 9.4      Neut % 64.4      Lymph % 23.3      Mono % 11.5      Eos % 0.0 (*)     Basophil % 0.4      Lymph # 1.26      Neut # 3.48      Mono # 0.62 (*)     Eos # 0.00 (*)     Baso # 0.02      IG# 0.02      IG% 0.4      NRBC% 0.0     MAGNESIUM - Normal     Magnesium Level 1.90     TROPONIN I - Normal    Troponin-I <0.012     TROPONIN I - Normal    Troponin-I <0.012     CBC W/ AUTO DIFFERENTIAL    Narrative:     The following orders were created for panel order CBC auto differential.  Procedure                               Abnormality         Status                     ---------                               -----------         ------                     CBC with Differential[[id number]]       Abnormal            Final result                 Please view results for these tests on the individual orders.     EKG Readings: (Independently Interpreted)   Initial Reading: No STEMI. Rhythm: Normal Sinus Rhythm. ST Segments: Normal ST Segments. T Waves: Normal.   Normal sinus rhythm at 88 beats per minute.  Nonspecific ST-T changes present.       Imaging Results              X-Ray Chest 1 View (Final result)  Result time 10/05/24 19:33:25      Final result by Joe Osuna MD (10/05/24 19:33:25)                   Impression:      Shallow depth of inspiration.  No acute abnormality.      Electronically signed by: Joe Osuna  Date:    10/05/2024  Time:    19:33               Narrative:    EXAMINATION:  XR CHEST 1 VIEW    CLINICAL HISTORY:  Chest pain, unspecified    TECHNIQUE:  Single frontal view of the chest was performed.    COMPARISON:  03/13/2019    FINDINGS:  The patient is imaged with a shallow depth of inspiration and there is some crowding of the perihilar bronchovesicular and interstitial markings with what appears to be subsegmental atelectasis.The lungs are otherwise clear, with normal appearance of pulmonary vasculature and no pleural effusion or pneumothorax.    The cardiac silhouette is normal in size. The hilar and mediastinal contours are unremarkable.  Atherosclerotic calcification is present involving the aorta.    Postsurgical findings are noted in the cervical region.  A right sided MediPort is present with its tip at the junction of the superior  vena cava and the right atrium.  Small catheters are again noted suggesting  shunt.                                       Medications   potassium chloride SA CR tablet 40 mEq (40 mEq Oral Given 10/5/24 1954)     Medical Decision Making  ACS versus pneumonia versus pneumothorax versus anxiety    Amount and/or Complexity of Data Reviewed  Labs: ordered.  Radiology: ordered.    Risk  Prescription drug management.                              Patient allergic to aspirin aspirin not given in the ED.        Clinical Impression:  Final diagnoses:  [R07.9] Chest pain (Primary)          ED Disposition Condition    Discharge Stable          ED Prescriptions    None       Follow-up Information    None          Jamal Wooten MD  10/05/24 [id number]

## 2024-10-07 LAB
OHS QRS DURATION: 74 MS
OHS QTC CALCULATION: 457 MS

## 2024-10-08 ENCOUNTER — LAB VISIT (OUTPATIENT)
Dept: LAB | Facility: HOSPITAL | Age: 54
End: 2024-10-08
Payer: MEDICARE

## 2024-10-08 DIAGNOSIS — E87.6 HYPOKALEMIA: ICD-10-CM

## 2024-10-08 LAB
ALBUMIN SERPL-MCNC: 3.8 G/DL (ref 3.4–5)
ALBUMIN/GLOB SERPL: 1.2 RATIO
ALP SERPL-CCNC: 855 UNIT/L (ref 50–144)
ALT SERPL-CCNC: 19 UNIT/L (ref 1–45)
ANION GAP SERPL CALC-SCNC: 8 MEQ/L (ref 2–13)
AST SERPL-CCNC: 40 UNIT/L (ref 14–36)
BILIRUB SERPL-MCNC: 1.6 MG/DL (ref 0–1)
BUN SERPL-MCNC: 17 MG/DL (ref 7–20)
CALCIUM SERPL-MCNC: 10.1 MG/DL (ref 8.4–10.2)
CHLORIDE SERPL-SCNC: 97 MMOL/L (ref 98–110)
CO2 SERPL-SCNC: 32 MMOL/L (ref 21–32)
CREAT SERPL-MCNC: 0.88 MG/DL (ref 0.66–1.25)
CREAT/UREA NIT SERPL: 19 (ref 12–20)
GFR SERPLBLD CREATININE-BSD FMLA CKD-EPI: 78 ML/MIN/1.73/M2
GLOBULIN SER-MCNC: 3.1 GM/DL (ref 2–3.9)
GLUCOSE SERPL-MCNC: 185 MG/DL (ref 70–115)
POTASSIUM SERPL-SCNC: 2.8 MMOL/L (ref 3.5–5.1)
PROT SERPL-MCNC: 6.9 GM/DL (ref 6.3–8.2)
SODIUM SERPL-SCNC: 137 MMOL/L (ref 136–145)

## 2024-10-08 PROCEDURE — 36415 COLL VENOUS BLD VENIPUNCTURE: CPT

## 2024-10-08 PROCEDURE — 80053 COMPREHEN METABOLIC PANEL: CPT

## 2024-10-09 RX ORDER — SODIUM CHLORIDE 0.9 % (FLUSH) 0.9 %
10 SYRINGE (ML) INJECTION
Status: CANCELLED | OUTPATIENT
Start: 2024-10-09

## 2024-10-09 RX ORDER — HEPARIN 100 UNIT/ML
500 SYRINGE INTRAVENOUS
Status: CANCELLED | OUTPATIENT
Start: 2024-10-09

## 2024-10-10 ENCOUNTER — INFUSION (OUTPATIENT)
Facility: HOSPITAL | Age: 54
End: 2024-10-10
Payer: MEDICARE

## 2024-10-10 VITALS
OXYGEN SATURATION: 97 % | SYSTOLIC BLOOD PRESSURE: 106 MMHG | HEIGHT: 70 IN | RESPIRATION RATE: 17 BRPM | DIASTOLIC BLOOD PRESSURE: 61 MMHG | TEMPERATURE: 98 F | WEIGHT: 143.19 LBS | BODY MASS INDEX: 20.5 KG/M2 | HEART RATE: 83 BPM

## 2024-10-10 DIAGNOSIS — C18.9 COLON ADENOCARCINOMA: ICD-10-CM

## 2024-10-10 DIAGNOSIS — E83.52 HYPERCALCEMIA: Primary | ICD-10-CM

## 2024-10-10 PROCEDURE — 96366 THER/PROPH/DIAG IV INF ADDON: CPT

## 2024-10-10 PROCEDURE — 63600175 PHARM REV CODE 636 W HCPCS

## 2024-10-10 PROCEDURE — 25000003 PHARM REV CODE 250

## 2024-10-10 PROCEDURE — A4216 STERILE WATER/SALINE, 10 ML: HCPCS

## 2024-10-10 PROCEDURE — 96365 THER/PROPH/DIAG IV INF INIT: CPT

## 2024-10-10 RX ORDER — SODIUM CHLORIDE 0.9 % (FLUSH) 0.9 %
10 SYRINGE (ML) INJECTION
Status: DISCONTINUED | OUTPATIENT
Start: 2024-10-10 | End: 2024-10-10 | Stop reason: HOSPADM

## 2024-10-10 RX ORDER — HEPARIN 100 UNIT/ML
500 SYRINGE INTRAVENOUS
Status: DISCONTINUED | OUTPATIENT
Start: 2024-10-10 | End: 2024-10-10 | Stop reason: HOSPADM

## 2024-10-10 RX ADMIN — POTASSIUM CHLORIDE 62.5 ML/HR: 2 INJECTION, SOLUTION, CONCENTRATE INTRAVENOUS at 10:10

## 2024-10-10 RX ADMIN — Medication 10 ML: at 03:10

## 2024-10-10 RX ADMIN — HEPARIN 500 UNITS: 100 SYRINGE at 03:10

## 2024-10-17 NOTE — PROGRESS NOTES
"Subjective:       Patient ID: Shira Nair is a 54 y.o. female.    Chief Complaint: New Patient    Diagnosis: Metastatic Colon Adenocarcinoma - mets to liver and lung    Current Treatment:  Xelox started 8/12/2024    Treatment History: FOLFOX q2w x 12 cycles-- to start 7/29/24    HPI  55yo F presented in June '24 for evaluation of liver metastases. She presented to OSH in May '24 with 1 month of abdominal pain. CT A/P at that time revealed numerous hepatic lesions, largest measuring 5.8 x 7cm, concerning for metastatic disease. CT Chest revealed numerous new bilateral pulmonary nodules measuring 3-4mm in size of indeterminate etiology. IR liver biopsy was done which was completely necrotic. An egd and colonoscopy done with Dr. Jones in June '24 revealed a severely stenotic malignant lesion in the mid descending colon that was unable to be traversed. Biopsy was taken which revealed at least high grade dysplasia suspicious for adenocarcinoma. Her CEA at time of imaging/workup was 546. She has a history of cervical cancer about 15 years ago, she had a total hysterectomy for this. She has a history of a benign brain tumor that required  shunt placed in the 1980s. She also had benign thyroid tumors removed about 8 years prior to presentation.     She underwent mediport placement and liver biopsy with ex lap with Dr. Cast on 7/18/24. Liver biopsy returned positive for metastatic colorectal adenocarcinoma. Will plan to proceed with 1st line FOLFOX with 3rd agent pending NGS studies.     Interval History:          10/21/2024:  Ms. Nair is here today with her sister for follow-up, labs, and cycle 3 capecitabine with Oxaliplatin for her metastatic colon cancer with mets to liver and lungs.  Today, she reports, feeling well.  She states, she is sad due to the death of her father.  She reports pain "2-3," on 0-10 pain scale and controlled with pain medication.   She is followed by palliative care for pain " management.  She is taking MS Contin  ER 15 mg BID and MS contin 15 mg immediate release as needed for breakthrough pain.  She denies fever, chills, fatigue, dizziness, SOB, cough, chest pain, heart palpitations, abnormal bleeding and bruising, heartburn, abdominal pain, nausea, vomiting, diarrhea, confirms constipation, denies mouth sores, back pain, change in bladder habits, and neuropathy.  Labs reviewed with patient.  Platelets 266,000 up from 126,000, 2 weeks ago.  Potassium 3.4.  Patient currently on potassium supplementation BID.  Eating and drinking.  Weight is stable.  Albumin is 2.8.  Advised patient to increase protein intake with shakes or high protein foods.  She denies any recent hospitalizations, infections, or illnesses.     Advised patient to restart her Capecitibine 1000 mg PO BID on 10/22/2024.  She will receive cycle 3 of Oxaliplatin on 10/22/2024.        She returns to the clinic today for C3 Xelox via phone call, sister on phone as well. She did get C1 FOLFOX on 7/29/2024, reported that her dog chewed through 5FU pump cord.  She feels well today and feels that she tolerated treatment well. Due her history of drug use, a drug screen was performed which was positive for fentanyl. Results were discussed in detail with her. Results discussed with Dr. LeJeune and decision was made to change form FOLFOX to Xelox. She denies any nausea, vomiting, diarrhea, constipation, SOB, CP, headache, or swelling. She had an ER visit on 9/28/2024 for abdominal pain and UTI. She is followed by palliative care, she is taking MS Contin  ER 15 mg BID and MS contin 15 mg immediate release as needed for breakthrough pain. She does report mild neuropathy to bilateral finger tips, grade 1. She does take lactulose for constipation. She reports she is feeling great and is able to play with her granddaughter. Labs reviewed with patient in detail, platelet count 62. Will hold treatment for 2 weeks.         Past Medical  History:   Diagnosis Date    Brain tumor     Cervical ca     Depression     Malignant neoplasm of colon, unspecified     Opioid abuse       Past Surgical History:   Procedure Laterality Date    BRAIN SURGERY      CHOLECYSTECTOMY      COLONOSCOPY N/A 06/06/2024    Procedure: COLON;  Surgeon: Kevin Causey MD;  Location: [facility];  Service: Gastroenterology;  Laterality: N/A;    COLONOSCOPY, WITH 1 OR MORE BIOPSIES  06/06/2024    Procedure: COLONOSCOPY, WITH 1 OR MORE BIOPSIES;  Surgeon: Kevin Causey MD;  Location: [facility];  Service: Gastroenterology;;    COLONOSCOPY, WITH DIRECTED SUBMUCOSAL INJECTION  06/06/2024    Procedure: COLONOSCOPY, WITH DIRECTED SUBMUCOSAL INJECTION;  Surgeon: Kevin Causey MD;  Location: [facility];  Service: Gastroenterology;;    DIAGNOSTIC LAPAROSCOPY N/A 7/18/2024    Procedure: LAPAROSCOPY, DIAGNOSTIC;  Surgeon: Rubio Cast MD;  Location: [facility];  Service: General;  Laterality: N/A;  WITH LAP LIVER BIOPSY - ULTRASOUND GUIDED    EGD, WITH CLOSED BIOPSY  06/06/2024    Procedure: EGD, WITH CLOSED BIOPSY;  Surgeon: Kevin Causey MD;  Location: [facility];  Service: Gastroenterology;;    ESOPHAGOGASTRODUODENOSCOPY N/A 06/06/2024    Procedure: DOUBLE;  Surgeon: Kevin Causey MD;  Location: [facility];  Service: Gastroenterology;  Laterality: N/A;    HYSTERECTOMY      INSERTION OF TUNNELED CENTRAL VENOUS CATHETER (CVC) WITH SUBCUTANEOUS PORT N/A 7/18/2024    Procedure: [id number];  Surgeon: Rubio Cast MD;  Location: [facility];  Service: General;  Laterality: N/A;    KNEE SURGERY      THYROID LOBECTOMY       Social History     Socioeconomic History    Marital status: Single   Tobacco Use    Smoking status: Never     Passive exposure: Never    Smokeless tobacco: Never   Substance and Sexual Activity    Alcohol use: Never    Drug use: Not Currently     Types: Cocaine      Comment: 5-6 YEARS QUIT ON SUBOXONE      Family History   Problem Relation Name Age of Onset    Hypertension Mother      Hypertension Father      Stroke Father      Pancreatic cancer Brother      Cancer Paternal Grandfather        Review of patient's allergies indicates:   Allergen Reactions    Ketorolac Hives    Tramadol Hives    Vancomycin analogues Hives      Review of Systems   Constitutional:  Negative for activity change, fatigue, fever and unexpected weight change.   HENT:  Negative for sore throat.    Eyes:  Negative for visual disturbance.   Respiratory:  Negative for cough and shortness of breath.    Cardiovascular:  Negative for chest pain.   Gastrointestinal:  Negative for abdominal pain, diarrhea, nausea and vomiting.   Endocrine: Negative for polyuria.   Genitourinary:  Negative for dysuria and hot flashes.   Integumentary:  Negative for rash.   Allergic/Immunologic: Negative for immunocompromised state.   Neurological:  Negative for weakness and headaches.   Hematological:  Negative for adenopathy.   Psychiatric/Behavioral:  Negative for confusion.          Objective:      Vitals:    10/21/24 0909   BP: 102/70   Pulse: 88   Resp: 18   Temp: 98.3 °F (36.8 °C)           Physical Exam  Constitutional:       General: She is not in acute distress.     Appearance: Normal appearance. She is not ill-appearing.   HENT:      Head: Normocephalic and atraumatic.      Nose: Nose normal.      Mouth/Throat:      Mouth: Mucous membranes are moist.      Pharynx: Oropharynx is clear.   Eyes:      Extraocular Movements: Extraocular movements intact.      Conjunctiva/sclera: Conjunctivae normal.      Pupils: Pupils are equal, round, and reactive to light.   Cardiovascular:      Rate and Rhythm: Normal rate and regular rhythm.      Pulses: Normal pulses.      Heart sounds: Normal heart sounds. No murmur heard.  Pulmonary:      Effort: Pulmonary effort is normal. No respiratory distress.      Breath sounds: Normal breath  sounds.   Abdominal:      General: There is no distension.      Palpations: Abdomen is soft.      Tenderness: There is no abdominal tenderness.   Musculoskeletal:         General: Normal range of motion.      Cervical back: Normal range of motion and neck supple.      Right lower leg: No edema.      Left lower leg: No edema.   Lymphadenopathy:      Cervical: No cervical adenopathy.   Skin:     General: Skin is warm and dry.   Neurological:      General: No focal deficit present.      Mental Status: She is alert and oriented to person, place, and time.         LABS AND IMAGING REVIEWED IN EPIC  1/3/24 CT Abd/Pelvis:  1. Postoperative changes are present compatible with a previous cholecystectomy. An elliptical calcification is noted within the gallbladder fossa and I suspect represents chronic calcification of a postoperative hematoma.  Clinical correlation is recommended.  2. Left-sided, nonobstructing nephrolithiasis as described above.  3. Chronic changes are present as described above.  See above comments.  5/13/24 CT Abd/Pelvis:  Changes most consistent with metastatic disease to the liver.   5/14/24 CT Chest:  Small indeterminate pulmonary nodules which were not seen in 2017. These can be followed on surveillance scans.   6/27/24 PET/CT:  1. Hepatic extensive metastatic involvement with hypermetabolic masses.  2. Descending colon concentric mural thickening and hypermetabolism is suggested to be the focus of colon carcinoma.  7/23/24 CT Abd/Pelvis:  1. A few subcentimeter non- calcified nodules are seen in the anterior and lateral middle lobe as well as posterior lower lobe, some of which are seen stable in the prior study. This are of concern for metastasis.   2. Multiple hypoenhancing lesions are again seen in both hepatic lobes with subtle decreased in size in the aggregate of lesions in the anterior right hepatic lobe measuring 5.9 x 5 cm (AP x T) on series 3 image 29. These are ascribed to metastatic  lesions.   3. There is stable 6 cm long annular wall thickening along the descending colon centered on series 6 image 32. This is consistent with the known primary colon malignancy.   4. Details and other findings as discussed above.  9/1/24 CT Abd/Pelvis:  1. Acute descending colonic diverticulitis without gross perforation or abscess formation.  2. Questionable punctate distal right ureteral stone near the ureterovesicular junction without hydronephrosis or hydroureter.  3. Multifocal hypoattenuating liver lesions with similar burden of disease compared to prior CT from 07/29/2024.  4. Unchanged multiple 4-5 mm lung base nodules.  5. Nonobstructing 9 mm left lower pole renal stone.  6. Additional findings as above.   9/28/24 CT Abd/Pelvis:  1. Previously seen 8 mm nonobstructing calculus in the lower pole calyx of the left kidney.  I see no evidence of obstructive uropathy.  2.  Previously seen findings compatible with hepatic metastasis, atelectasis and or patchy consolidation involving the visible lower lung base, stranding and wall thickening involving the mid descending colonic region as well as other chronic and postsurgical findings.         Pathology:  6/6/24 EGD/Colonoscopy Biopsy:  1. STOMACH BIOPSY:    -MODERATE CHRONIC GASTRITIS, FOCALLY ACTIVE (SEE COMMENT).    -NO EVIDENCE OF DYSPLASIA OR MALIGNANCY.   2. COLON STRICTURE BIOPSY:    -FRAGMENTS OF COLONIC-TYPE MUCOSA WITH AT LEAST HIGH GRADE DYSPLASIA, SUSPICIOUS   FOR ADENOCARCINOMA (SEE COMMENTS).   7/18/24 Laparoscopy:  1. PERITONEUM, BIOPSY:    -MESOTHELIUM-LINED MEMBRANOUS FIBROADIPOSE TISSUE. NO NEOPLASM.   2. LEFT LOBE OF LIVER, WEDGE BIOPSY:    -METASTATIC COLORECTAL ADENOCARCINOMA.     Assessment:   Metastatic Colorectal Adenocarcinoma - mets to liver, extensive and unresectable.   PET/CT 6/27/24 revealed hepatic extensive metastatic involvement with hypermetabolic masses and descending colon  Per Dr. Cast no role for diversion at this time  given no symptoms. Colon cancer of the descending colon with unresectable liver metastasis, relatively bulky disease within the liver.   Laparoscopy and mediport placement performed 7/18/24, liver with metastatic colorectal adenocarcinoma, NGS pending  Plan for FOLFOX q2w x 12 cycles, to start 7/29  Dog chewed through pump cord for C1, drug screen positive for fentynal and opiates. Will switch to xelox at this time.         Plan:   Proceed C3 XELOX on 10/22/2024.  Reduce oxaliplatin to 110 mg/m2 and Xeloda to 1000 mg BID (discussed with Dr. Lejeune)  Continue oral potassium twice daily  Xeloda 1000 mg BID days 1-14/21 with oxali q3w.    Continue follow up with pain management  RTC in 3 weeks with NP for in person visit and labs/C4 XELOX   Cbc, cmp, cea- 1 hr prior @ [facility]      Suggested dose modifications for toxicity:   Myelotoxicity The treatment cycle should be delayed one week if the total WBC count is <3000/microL, ANC is <1500/microL, or the platelet count is <100,000/microL on day 1. If treatment is delayed for two weeks or delayed for one week on two separate occasions, reduce the doses of oxaliplatin and capecitabine by 10 to 20%. Subsequent treatment cycles should be delayed until neutrophils are >=1500/microL and platelets are >=75,000/microL.     The patient is in agreement with today's plan of care.  All questions answered.  Patient is aware to contact our office for any problems or concerns prior to next visit.      Claire Salmeron, MSN-ED, APRN, AGACNP-BC, OCN

## 2024-10-21 ENCOUNTER — OFFICE VISIT (OUTPATIENT)
Dept: HEMATOLOGY/ONCOLOGY | Facility: CLINIC | Age: 54
End: 2024-10-21
Payer: MEDICARE

## 2024-10-21 ENCOUNTER — LAB VISIT (OUTPATIENT)
Dept: LAB | Facility: HOSPITAL | Age: 54
End: 2024-10-21
Attending: STUDENT IN AN ORGANIZED HEALTH CARE EDUCATION/TRAINING PROGRAM
Payer: MEDICARE

## 2024-10-21 VITALS
RESPIRATION RATE: 18 BRPM | TEMPERATURE: 98 F | SYSTOLIC BLOOD PRESSURE: 102 MMHG | WEIGHT: 144.69 LBS | BODY MASS INDEX: 20.71 KG/M2 | HEART RATE: 88 BPM | OXYGEN SATURATION: 99 % | DIASTOLIC BLOOD PRESSURE: 70 MMHG | HEIGHT: 70 IN

## 2024-10-21 DIAGNOSIS — G89.3 CANCER RELATED PAIN: ICD-10-CM

## 2024-10-21 DIAGNOSIS — Z79.899 ON ANTINEOPLASTIC CHEMOTHERAPY: ICD-10-CM

## 2024-10-21 DIAGNOSIS — C78.7 METASTASIS TO LIVER: ICD-10-CM

## 2024-10-21 DIAGNOSIS — C18.9 COLON ADENOCARCINOMA: ICD-10-CM

## 2024-10-21 DIAGNOSIS — G89.3 CANCER RELATED PAIN: Primary | ICD-10-CM

## 2024-10-21 DIAGNOSIS — C78.00 MALIGNANT NEOPLASM METASTATIC TO LUNG, UNSPECIFIED LATERALITY: ICD-10-CM

## 2024-10-21 DIAGNOSIS — D69.59 CHEMOTHERAPY-INDUCED THROMBOCYTOPENIA: ICD-10-CM

## 2024-10-21 DIAGNOSIS — E87.6 HYPOKALEMIA: ICD-10-CM

## 2024-10-21 DIAGNOSIS — T45.1X5A CHEMOTHERAPY-INDUCED THROMBOCYTOPENIA: ICD-10-CM

## 2024-10-21 LAB
ALBUMIN SERPL-MCNC: 2.8 G/DL (ref 3.5–5)
ALBUMIN/GLOB SERPL: 0.6 RATIO (ref 1.1–2)
ALP SERPL-CCNC: 950 UNIT/L (ref 40–150)
ALT SERPL-CCNC: 12 UNIT/L (ref 0–55)
ANION GAP SERPL CALC-SCNC: 9 MEQ/L
AST SERPL-CCNC: 26 UNIT/L (ref 5–34)
BASOPHILS # BLD AUTO: 0.02 X10(3)/MCL
BASOPHILS NFR BLD AUTO: 0.3 %
BILIRUB SERPL-MCNC: 0.7 MG/DL
BUN SERPL-MCNC: 9 MG/DL (ref 9.8–20.1)
CALCIUM SERPL-MCNC: 9.9 MG/DL (ref 8.4–10.2)
CEA SERPL-MCNC: 314.98 NG/ML (ref 0–3)
CHLORIDE SERPL-SCNC: 102 MMOL/L (ref 98–107)
CO2 SERPL-SCNC: 29 MMOL/L (ref 22–29)
CREAT SERPL-MCNC: 0.79 MG/DL (ref 0.55–1.02)
CREAT/UREA NIT SERPL: 11
EOSINOPHIL # BLD AUTO: 0 X10(3)/MCL (ref 0–0.9)
EOSINOPHIL NFR BLD AUTO: 0 %
ERYTHROCYTE [DISTWIDTH] IN BLOOD BY AUTOMATED COUNT: 18.9 % (ref 11.5–17)
GFR SERPLBLD CREATININE-BSD FMLA CKD-EPI: >60 ML/MIN/1.73/M2
GLOBULIN SER-MCNC: 4.7 GM/DL (ref 2.4–3.5)
GLUCOSE SERPL-MCNC: 173 MG/DL (ref 74–100)
HCT VFR BLD AUTO: 36 % (ref 37–47)
HGB BLD-MCNC: 11.1 G/DL (ref 12–16)
IMM GRANULOCYTES # BLD AUTO: 0.04 X10(3)/MCL (ref 0–0.04)
IMM GRANULOCYTES NFR BLD AUTO: 0.6 %
LYMPHOCYTES # BLD AUTO: 1.01 X10(3)/MCL (ref 0.6–4.6)
LYMPHOCYTES NFR BLD AUTO: 15.1 %
MCH RBC QN AUTO: 26.9 PG (ref 27–31)
MCHC RBC AUTO-ENTMCNC: 30.8 G/DL (ref 33–36)
MCV RBC AUTO: 87.4 FL (ref 80–94)
MONOCYTES # BLD AUTO: 0.53 X10(3)/MCL (ref 0.1–1.3)
MONOCYTES NFR BLD AUTO: 7.9 %
NEUTROPHILS # BLD AUTO: 5.1 X10(3)/MCL (ref 2.1–9.2)
NEUTROPHILS NFR BLD AUTO: 76.1 %
PLATELET # BLD AUTO: 266 X10(3)/MCL (ref 130–400)
PMV BLD AUTO: 9.2 FL (ref 7.4–10.4)
POTASSIUM SERPL-SCNC: 3.4 MMOL/L (ref 3.5–5.1)
PROT SERPL-MCNC: 7.5 GM/DL (ref 6.4–8.3)
RBC # BLD AUTO: 4.12 X10(6)/MCL (ref 4.2–5.4)
SODIUM SERPL-SCNC: 140 MMOL/L (ref 136–145)
WBC # BLD AUTO: 6.7 X10(3)/MCL (ref 4.5–11.5)

## 2024-10-21 PROCEDURE — 1159F MED LIST DOCD IN RCRD: CPT | Mod: CPTII,S$GLB,, | Performed by: NURSE PRACTITIONER

## 2024-10-21 PROCEDURE — 99999 PR PBB SHADOW E&M-EST. PATIENT-LVL IV: CPT | Mod: PBBFAC,,, | Performed by: NURSE PRACTITIONER

## 2024-10-21 PROCEDURE — 3078F DIAST BP <80 MM HG: CPT | Mod: CPTII,S$GLB,, | Performed by: NURSE PRACTITIONER

## 2024-10-21 PROCEDURE — 80053 COMPREHEN METABOLIC PANEL: CPT

## 2024-10-21 PROCEDURE — 3008F BODY MASS INDEX DOCD: CPT | Mod: CPTII,S$GLB,, | Performed by: NURSE PRACTITIONER

## 2024-10-21 PROCEDURE — 85025 COMPLETE CBC W/AUTO DIFF WBC: CPT

## 2024-10-21 PROCEDURE — 99215 OFFICE O/P EST HI 40 MIN: CPT | Mod: S$GLB,,, | Performed by: NURSE PRACTITIONER

## 2024-10-21 PROCEDURE — 82378 CARCINOEMBRYONIC ANTIGEN: CPT

## 2024-10-21 PROCEDURE — 36415 COLL VENOUS BLD VENIPUNCTURE: CPT

## 2024-10-21 PROCEDURE — 3074F SYST BP LT 130 MM HG: CPT | Mod: CPTII,S$GLB,, | Performed by: NURSE PRACTITIONER

## 2024-10-21 RX ORDER — HEPARIN 100 UNIT/ML
500 SYRINGE INTRAVENOUS
Status: CANCELLED | OUTPATIENT
Start: 2024-10-22

## 2024-10-21 RX ORDER — SODIUM CHLORIDE 0.9 % (FLUSH) 0.9 %
10 SYRINGE (ML) INJECTION
Status: CANCELLED | OUTPATIENT
Start: 2024-10-22

## 2024-10-21 RX ORDER — LIDOCAINE AND PRILOCAINE 25; 25 MG/G; MG/G
CREAM TOPICAL
COMMUNITY
Start: 2024-10-10

## 2024-10-21 RX ORDER — PROCHLORPERAZINE EDISYLATE 5 MG/ML
5 INJECTION INTRAMUSCULAR; INTRAVENOUS ONCE AS NEEDED
Status: CANCELLED
Start: 2024-10-22

## 2024-10-21 RX ORDER — EPINEPHRINE 0.3 MG/.3ML
0.3 INJECTION SUBCUTANEOUS ONCE AS NEEDED
Status: CANCELLED | OUTPATIENT
Start: 2024-10-22

## 2024-10-21 RX ORDER — DIPHENHYDRAMINE HYDROCHLORIDE 50 MG/ML
50 INJECTION INTRAMUSCULAR; INTRAVENOUS ONCE AS NEEDED
Status: CANCELLED | OUTPATIENT
Start: 2024-10-22

## 2024-10-22 ENCOUNTER — INFUSION (OUTPATIENT)
Facility: HOSPITAL | Age: 54
End: 2024-10-22
Attending: STUDENT IN AN ORGANIZED HEALTH CARE EDUCATION/TRAINING PROGRAM
Payer: MEDICARE

## 2024-10-22 ENCOUNTER — PATIENT MESSAGE (OUTPATIENT)
Dept: RESEARCH | Facility: HOSPITAL | Age: 54
End: 2024-10-22
Payer: MEDICARE

## 2024-10-22 VITALS
HEART RATE: 78 BPM | SYSTOLIC BLOOD PRESSURE: 126 MMHG | DIASTOLIC BLOOD PRESSURE: 78 MMHG | BODY MASS INDEX: 20.87 KG/M2 | WEIGHT: 145.81 LBS | RESPIRATION RATE: 18 BRPM | HEIGHT: 70 IN | OXYGEN SATURATION: 99 %

## 2024-10-22 DIAGNOSIS — C78.7 METASTASIS TO LIVER: ICD-10-CM

## 2024-10-22 DIAGNOSIS — C18.9 COLON ADENOCARCINOMA: Primary | ICD-10-CM

## 2024-10-22 PROCEDURE — A4216 STERILE WATER/SALINE, 10 ML: HCPCS | Performed by: NURSE PRACTITIONER

## 2024-10-22 PROCEDURE — 96375 TX/PRO/DX INJ NEW DRUG ADDON: CPT

## 2024-10-22 PROCEDURE — 25000003 PHARM REV CODE 250: Performed by: NURSE PRACTITIONER

## 2024-10-22 PROCEDURE — 63600175 PHARM REV CODE 636 W HCPCS: Performed by: NURSE PRACTITIONER

## 2024-10-22 PROCEDURE — 96415 CHEMO IV INFUSION ADDL HR: CPT

## 2024-10-22 PROCEDURE — 96413 CHEMO IV INFUSION 1 HR: CPT

## 2024-10-22 RX ORDER — SODIUM CHLORIDE 0.9 % (FLUSH) 0.9 %
10 SYRINGE (ML) INJECTION
Status: DISCONTINUED | OUTPATIENT
Start: 2024-10-22 | End: 2024-10-22 | Stop reason: HOSPADM

## 2024-10-22 RX ORDER — PROCHLORPERAZINE EDISYLATE 5 MG/ML
5 INJECTION INTRAMUSCULAR; INTRAVENOUS ONCE AS NEEDED
Status: DISCONTINUED | OUTPATIENT
Start: 2024-10-22 | End: 2024-10-22 | Stop reason: HOSPADM

## 2024-10-22 RX ORDER — HEPARIN 100 UNIT/ML
500 SYRINGE INTRAVENOUS
Status: DISCONTINUED | OUTPATIENT
Start: 2024-10-22 | End: 2024-10-22 | Stop reason: HOSPADM

## 2024-10-22 RX ORDER — DIPHENHYDRAMINE HYDROCHLORIDE 50 MG/ML
50 INJECTION INTRAMUSCULAR; INTRAVENOUS ONCE AS NEEDED
Status: DISCONTINUED | OUTPATIENT
Start: 2024-10-22 | End: 2024-10-22 | Stop reason: HOSPADM

## 2024-10-22 RX ORDER — EPINEPHRINE 0.3 MG/.3ML
0.3 INJECTION SUBCUTANEOUS ONCE AS NEEDED
Status: DISCONTINUED | OUTPATIENT
Start: 2024-10-22 | End: 2024-10-22 | Stop reason: HOSPADM

## 2024-10-22 RX ADMIN — HEPARIN 500 UNITS: 100 SYRINGE at 12:10

## 2024-10-22 RX ADMIN — DEXTROSE MONOHYDRATE: 50 INJECTION, SOLUTION INTRAVENOUS at 10:10

## 2024-10-22 RX ADMIN — OXALIPLATIN 200 MG: 100 INJECTION, SOLUTION, CONCENTRATE INTRAVENOUS at 10:10

## 2024-10-22 RX ADMIN — DEXAMETHASONE SODIUM PHOSPHATE 0.25 MG: 4 INJECTION, SOLUTION INTRA-ARTICULAR; INTRALESIONAL; INTRAMUSCULAR; INTRAVENOUS; SOFT TISSUE at 09:10

## 2024-10-22 RX ADMIN — Medication 10 ML: at 12:10

## 2024-11-13 ENCOUNTER — LAB VISIT (OUTPATIENT)
Dept: LAB | Facility: HOSPITAL | Age: 54
End: 2024-11-13
Payer: MEDICARE

## 2024-11-13 DIAGNOSIS — G89.3 CANCER RELATED PAIN: ICD-10-CM

## 2024-11-13 DIAGNOSIS — C18.9 COLON ADENOCARCINOMA: ICD-10-CM

## 2024-11-13 LAB
ALBUMIN SERPL-MCNC: 3 G/DL (ref 3.5–5)
ALBUMIN/GLOB SERPL: 0.6 RATIO (ref 1.1–2)
ALP SERPL-CCNC: 860 UNIT/L (ref 40–150)
ALT SERPL-CCNC: 13 UNIT/L (ref 0–55)
ANION GAP SERPL CALC-SCNC: 8 MEQ/L
AST SERPL-CCNC: 37 UNIT/L (ref 5–34)
BASOPHILS # BLD AUTO: 0.02 X10(3)/MCL
BASOPHILS NFR BLD AUTO: 0.3 %
BILIRUB SERPL-MCNC: 1.4 MG/DL
BUN SERPL-MCNC: 10 MG/DL (ref 9.8–20.1)
CALCIUM SERPL-MCNC: 10 MG/DL (ref 8.4–10.2)
CEA SERPL-MCNC: 641.6 NG/ML (ref 0–3)
CHLORIDE SERPL-SCNC: 104 MMOL/L (ref 98–107)
CO2 SERPL-SCNC: 28 MMOL/L (ref 22–29)
CREAT SERPL-MCNC: 0.87 MG/DL (ref 0.55–1.02)
CREAT/UREA NIT SERPL: 11
EOSINOPHIL # BLD AUTO: 0 X10(3)/MCL (ref 0–0.9)
EOSINOPHIL NFR BLD AUTO: 0 %
ERYTHROCYTE [DISTWIDTH] IN BLOOD BY AUTOMATED COUNT: 20.7 % (ref 11.5–17)
GFR SERPLBLD CREATININE-BSD FMLA CKD-EPI: >60 ML/MIN/1.73/M2
GLOBULIN SER-MCNC: 4.9 GM/DL (ref 2.4–3.5)
GLUCOSE SERPL-MCNC: 154 MG/DL (ref 74–100)
HCT VFR BLD AUTO: 38.3 % (ref 37–47)
HGB BLD-MCNC: 11.9 G/DL (ref 12–16)
IMM GRANULOCYTES # BLD AUTO: 0.02 X10(3)/MCL (ref 0–0.04)
IMM GRANULOCYTES NFR BLD AUTO: 0.3 %
LYMPHOCYTES # BLD AUTO: 1.57 X10(3)/MCL (ref 0.6–4.6)
LYMPHOCYTES NFR BLD AUTO: 25.6 %
MAGNESIUM SERPL-MCNC: 2 MG/DL (ref 1.6–2.6)
MCH RBC QN AUTO: 27.2 PG (ref 27–31)
MCHC RBC AUTO-ENTMCNC: 31.1 G/DL (ref 33–36)
MCV RBC AUTO: 87.4 FL (ref 80–94)
MONOCYTES # BLD AUTO: 0.6 X10(3)/MCL (ref 0.1–1.3)
MONOCYTES NFR BLD AUTO: 9.8 %
NEUTROPHILS # BLD AUTO: 3.92 X10(3)/MCL (ref 2.1–9.2)
NEUTROPHILS NFR BLD AUTO: 64 %
PHOSPHATE SERPL-MCNC: 3 MG/DL (ref 2.3–4.7)
PLATELET # BLD AUTO: 128 X10(3)/MCL (ref 130–400)
PMV BLD AUTO: 9.6 FL (ref 7.4–10.4)
POTASSIUM SERPL-SCNC: 3.6 MMOL/L (ref 3.5–5.1)
PROT SERPL-MCNC: 7.9 GM/DL (ref 6.4–8.3)
RBC # BLD AUTO: 4.38 X10(6)/MCL (ref 4.2–5.4)
SODIUM SERPL-SCNC: 140 MMOL/L (ref 136–145)
WBC # BLD AUTO: 6.13 X10(3)/MCL (ref 4.5–11.5)

## 2024-11-13 PROCEDURE — 83735 ASSAY OF MAGNESIUM: CPT

## 2024-11-13 PROCEDURE — 80053 COMPREHEN METABOLIC PANEL: CPT

## 2024-11-13 PROCEDURE — 85025 COMPLETE CBC W/AUTO DIFF WBC: CPT

## 2024-11-13 PROCEDURE — 84100 ASSAY OF PHOSPHORUS: CPT

## 2024-11-13 PROCEDURE — 82378 CARCINOEMBRYONIC ANTIGEN: CPT

## 2024-11-13 PROCEDURE — 36415 COLL VENOUS BLD VENIPUNCTURE: CPT

## 2024-11-14 ENCOUNTER — OFFICE VISIT (OUTPATIENT)
Dept: HEMATOLOGY/ONCOLOGY | Facility: CLINIC | Age: 54
End: 2024-11-14
Payer: MEDICARE

## 2024-11-14 ENCOUNTER — INFUSION (OUTPATIENT)
Dept: INFUSION THERAPY | Facility: HOSPITAL | Age: 54
End: 2024-11-14
Attending: STUDENT IN AN ORGANIZED HEALTH CARE EDUCATION/TRAINING PROGRAM
Payer: MEDICARE

## 2024-11-14 VITALS
TEMPERATURE: 98 F | HEIGHT: 70 IN | SYSTOLIC BLOOD PRESSURE: 108 MMHG | OXYGEN SATURATION: 97 % | DIASTOLIC BLOOD PRESSURE: 69 MMHG | BODY MASS INDEX: 19.9 KG/M2 | RESPIRATION RATE: 18 BRPM | WEIGHT: 139 LBS | HEART RATE: 83 BPM

## 2024-11-14 DIAGNOSIS — C78.00 MALIGNANT NEOPLASM METASTATIC TO LUNG, UNSPECIFIED LATERALITY: ICD-10-CM

## 2024-11-14 DIAGNOSIS — T45.1X5A CHEMOTHERAPY-INDUCED THROMBOCYTOPENIA: ICD-10-CM

## 2024-11-14 DIAGNOSIS — C78.7 METASTASIS TO LIVER: ICD-10-CM

## 2024-11-14 DIAGNOSIS — D69.59 CHEMOTHERAPY-INDUCED THROMBOCYTOPENIA: ICD-10-CM

## 2024-11-14 DIAGNOSIS — C18.9 COLON ADENOCARCINOMA: Primary | ICD-10-CM

## 2024-11-14 DIAGNOSIS — G89.3 CANCER RELATED PAIN: ICD-10-CM

## 2024-11-14 DIAGNOSIS — Z79.899 ON ANTINEOPLASTIC CHEMOTHERAPY: ICD-10-CM

## 2024-11-14 PROCEDURE — 96367 TX/PROPH/DG ADDL SEQ IV INF: CPT

## 2024-11-14 PROCEDURE — 96415 CHEMO IV INFUSION ADDL HR: CPT

## 2024-11-14 PROCEDURE — 63600175 PHARM REV CODE 636 W HCPCS

## 2024-11-14 PROCEDURE — 99999 PR PBB SHADOW E&M-EST. PATIENT-LVL III: CPT | Mod: PBBFAC,,,

## 2024-11-14 PROCEDURE — 25000003 PHARM REV CODE 250

## 2024-11-14 PROCEDURE — A4216 STERILE WATER/SALINE, 10 ML: HCPCS

## 2024-11-14 PROCEDURE — 96413 CHEMO IV INFUSION 1 HR: CPT

## 2024-11-14 RX ORDER — PROCHLORPERAZINE EDISYLATE 5 MG/ML
5 INJECTION INTRAMUSCULAR; INTRAVENOUS ONCE AS NEEDED
Status: CANCELLED
Start: 2024-11-14

## 2024-11-14 RX ORDER — HEPARIN 100 UNIT/ML
500 SYRINGE INTRAVENOUS
Status: DISCONTINUED | OUTPATIENT
Start: 2024-11-14 | End: 2024-11-14 | Stop reason: HOSPADM

## 2024-11-14 RX ORDER — SODIUM CHLORIDE 0.9 % (FLUSH) 0.9 %
10 SYRINGE (ML) INJECTION
Status: CANCELLED | OUTPATIENT
Start: 2024-11-14

## 2024-11-14 RX ORDER — HEPARIN 100 UNIT/ML
500 SYRINGE INTRAVENOUS
Status: CANCELLED | OUTPATIENT
Start: 2024-11-14

## 2024-11-14 RX ORDER — SODIUM CHLORIDE 0.9 % (FLUSH) 0.9 %
10 SYRINGE (ML) INJECTION
Status: DISCONTINUED | OUTPATIENT
Start: 2024-11-14 | End: 2024-11-14 | Stop reason: HOSPADM

## 2024-11-14 RX ORDER — EPINEPHRINE 0.3 MG/.3ML
0.3 INJECTION SUBCUTANEOUS ONCE AS NEEDED
Status: CANCELLED | OUTPATIENT
Start: 2024-11-14

## 2024-11-14 RX ORDER — DIPHENHYDRAMINE HYDROCHLORIDE 50 MG/ML
50 INJECTION INTRAMUSCULAR; INTRAVENOUS ONCE AS NEEDED
Status: CANCELLED | OUTPATIENT
Start: 2024-11-14

## 2024-11-14 RX ADMIN — DEXTROSE MONOHYDRATE: 50 INJECTION, SOLUTION INTRAVENOUS at 10:11

## 2024-11-14 RX ADMIN — Medication 10 ML: at 12:11

## 2024-11-14 RX ADMIN — Medication 500 UNITS: at 12:11

## 2024-11-14 RX ADMIN — DEXAMETHASONE SODIUM PHOSPHATE 0.25 MG: 4 INJECTION, SOLUTION INTRA-ARTICULAR; INTRALESIONAL; INTRAMUSCULAR; INTRAVENOUS; SOFT TISSUE at 10:11

## 2024-11-14 RX ADMIN — OXALIPLATIN 200 MG: 5 INJECTION, SOLUTION INTRAVENOUS at 10:11

## 2024-11-14 NOTE — PLAN OF CARE
Pt will be educated on cold intolerance due to medication. Pt will verbalize understanding of knowledge of no cold drinks, cold water, ice, once treatment has started and for 5 days after.

## 2024-11-14 NOTE — PROGRESS NOTES
Subjective:       Patient ID: Shira Nair is a 54 y.o. female.    Chief Complaint: New Patient    Diagnosis: Metastatic Colon Adenocarcinoma - mets to liver and lung    Current Treatment:  Xelox started 8/12/2024    Treatment History: FOLFOX q2w x 12 cycles-- to start 7/29/24    HPI  55yo F presented in June '24 for evaluation of liver metastases. She presented to OSH in May '24 with 1 month of abdominal pain. CT A/P at that time revealed numerous hepatic lesions, largest measuring 5.8 x 7cm, concerning for metastatic disease. CT Chest revealed numerous new bilateral pulmonary nodules measuring 3-4mm in size of indeterminate etiology. IR liver biopsy was done which was completely necrotic. An egd and colonoscopy done with Dr. Jones in June '24 revealed a severely stenotic malignant lesion in the mid descending colon that was unable to be traversed. Biopsy was taken which revealed at least high grade dysplasia suspicious for adenocarcinoma. Her CEA at time of imaging/workup was 546. She has a history of cervical cancer about 15 years ago, she had a total hysterectomy for this. She has a history of a benign brain tumor that required  shunt placed in the 1980s. She also had benign thyroid tumors removed about 8 years prior to presentation.     She underwent mediport placement and liver biopsy with ex lap with Dr. Cast on 7/18/24. Liver biopsy returned positive for metastatic colorectal adenocarcinoma. Will plan to proceed with 1st line FOLFOX with 3rd agent pending NGS studies.     Interval History:    11/14/2024:  Ms. Nair is here today for follow-up, labs, and cycle 4 capecitabine with Oxaliplatin for her metastatic colon cancer with mets to liver and lungs. With last cycle, capecitabine was decreased to 1000 mg BID for 14 days and oxaliplatin was decreased to 110 mg/m2 due to thrombocytopenia. Today, she reports, feeling well.  She states, she is sad due to the death of her father.  She reports  "pain "2-3," on 0-10 pain scale and controlled with pain medication.   She is followed by palliative care for pain management.  She is taking MS Contin  ER 30 mg BID and MS contin 15 mg immediate release as needed for breakthrough pain.  She denies fever, chills, fatigue, dizziness, SOB, cough, chest pain, heart palpitations, abnormal bleeding and bruising, heartburn, abdominal pain, nausea, vomiting, diarrhea, confirms constipation, denies mouth sores, back pain, change in bladder habits, and neuropathy.  Labs reviewed with patient.  Eating and drinking.  Weight is stable. She denies any recent hospitalizations, infections, or illnesses. She does report dry mouth.         She returns to the clinic today for C3 Xelox via phone call, sister on phone as well. She did get C1 FOLFOX on 7/29/2024, reported that her dog chewed through 5FU pump cord.  She feels well today and feels that she tolerated treatment well. Due her history of drug use, a drug screen was performed which was positive for fentanyl. Results were discussed in detail with her. Results discussed with Dr. LeJeune and decision was made to change form FOLFOX to Xelox. She denies any nausea, vomiting, diarrhea, constipation, SOB, CP, headache, or swelling. She had an ER visit on 9/28/2024 for abdominal pain and UTI. She is followed by palliative care, she is taking MS Contin  ER 15 mg BID and MS contin 15 mg immediate release as needed for breakthrough pain. She does report mild neuropathy to bilateral finger tips, grade 1. She does take lactulose for constipation. She reports she is feeling great and is able to play with her granddaughter. Labs reviewed with patient in detail, platelet count 62. Will hold treatment for 2 weeks.         Past Medical History:   Diagnosis Date    Brain tumor     Cervical ca     Depression     Malignant neoplasm of colon, unspecified     Opioid abuse       Past Surgical History:   Procedure Laterality Date    BRAIN SURGERY      " CHOLECYSTECTOMY      COLONOSCOPY N/A 06/06/2024    Procedure: COLON;  Surgeon: Kevin Causey MD;  Location: [facility] ENDOSCOPY;  Service: Gastroenterology;  Laterality: N/A;    COLONOSCOPY, WITH 1 OR MORE BIOPSIES  06/06/2024    Procedure: COLONOSCOPY, WITH 1 OR MORE BIOPSIES;  Surgeon: Kevin Causey MD;  Location: [facility] ENDOSCOPY;  Service: Gastroenterology;;    COLONOSCOPY, WITH DIRECTED SUBMUCOSAL INJECTION  06/06/2024    Procedure: COLONOSCOPY, WITH DIRECTED SUBMUCOSAL INJECTION;  Surgeon: Kevin Causey MD;  Location: [facility] ENDOSCOPY;  Service: Gastroenterology;;    DIAGNOSTIC LAPAROSCOPY N/A 7/18/2024    Procedure: LAPAROSCOPY, DIAGNOSTIC;  Surgeon: Rubio Cast MD;  Location: [facility];  Service: General;  Laterality: N/A;  WITH LAP LIVER BIOPSY - ULTRASOUND GUIDED    EGD, WITH CLOSED BIOPSY  06/06/2024    Procedure: EGD, WITH CLOSED BIOPSY;  Surgeon: Kevin Causey MD;  Location: [facility] ENDOSCOPY;  Service: Gastroenterology;;    ESOPHAGOGASTRODUODENOSCOPY N/A 06/06/2024    Procedure: DOUBLE;  Surgeon: Kevin Causey MD;  Location: [facility] ENDOSCOPY;  Service: Gastroenterology;  Laterality: N/A;    HYSTERECTOMY      INSERTION OF TUNNELED CENTRAL VENOUS CATHETER (CVC) WITH SUBCUTANEOUS PORT N/A 7/18/2024    Procedure: [id number];  Surgeon: Rubio Cast MD;  Location: [facility];  Service: General;  Laterality: N/A;    KNEE SURGERY      THYROID LOBECTOMY       Social History     Socioeconomic History    Marital status: Single   Tobacco Use    Smoking status: Never     Passive exposure: Never    Smokeless tobacco: Never   Substance and Sexual Activity    Alcohol use: Never    Drug use: Not Currently     Types: Cocaine     Comment: 5-6 YEARS QUIT ON SUBOXONE      Family History   Problem Relation Name Age of Onset    Hypertension Mother      Hypertension Father      Stroke Father      Pancreatic cancer Brother      Cancer Paternal  Grandfather        Review of patient's allergies indicates:   Allergen Reactions    Ketorolac Hives    Tramadol Hives    Vancomycin analogues Hives      Review of Systems   Constitutional:  Negative for activity change, fatigue, fever and unexpected weight change.   HENT:  Positive for mouth dryness. Negative for sore throat.    Eyes:  Negative for visual disturbance.   Respiratory:  Negative for cough and shortness of breath.    Cardiovascular:  Negative for chest pain.   Gastrointestinal:  Negative for abdominal pain, diarrhea, nausea and vomiting.   Endocrine: Negative for polyuria.   Genitourinary:  Negative for dysuria and hot flashes.   Integumentary:  Negative for rash.   Allergic/Immunologic: Negative for immunocompromised state.   Neurological:  Negative for weakness and headaches.   Hematological:  Negative for adenopathy.   Psychiatric/Behavioral:  Negative for confusion.          Objective:      There were no vitals filed for this visit.          Physical Exam  Constitutional:       General: She is not in acute distress.     Appearance: Normal appearance. She is not ill-appearing.   HENT:      Head: Normocephalic and atraumatic.      Nose: Nose normal.      Mouth/Throat:      Mouth: Mucous membranes are moist.      Pharynx: Oropharynx is clear.   Eyes:      Extraocular Movements: Extraocular movements intact.      Conjunctiva/sclera: Conjunctivae normal.      Pupils: Pupils are equal, round, and reactive to light.   Cardiovascular:      Rate and Rhythm: Normal rate and regular rhythm.      Pulses: Normal pulses.      Heart sounds: Normal heart sounds. No murmur heard.  Pulmonary:      Effort: Pulmonary effort is normal. No respiratory distress.      Breath sounds: Normal breath sounds.   Abdominal:      General: There is no distension.      Palpations: Abdomen is soft.      Tenderness: There is no abdominal tenderness.   Musculoskeletal:         General: Normal range of motion.      Cervical back: Normal  range of motion and neck supple.      Right lower leg: No edema.      Left lower leg: No edema.   Lymphadenopathy:      Cervical: No cervical adenopathy.   Skin:     General: Skin is warm and dry.   Neurological:      General: No focal deficit present.      Mental Status: She is alert and oriented to person, place, and time.         LABS AND IMAGING REVIEWED IN EPIC  1/3/24 CT Abd/Pelvis:  1. Postoperative changes are present compatible with a previous cholecystectomy. An elliptical calcification is noted within the gallbladder fossa and I suspect represents chronic calcification of a postoperative hematoma.  Clinical correlation is recommended.  2. Left-sided, nonobstructing nephrolithiasis as described above.  3. Chronic changes are present as described above.  See above comments.  5/13/24 CT Abd/Pelvis:  Changes most consistent with metastatic disease to the liver.   5/14/24 CT Chest:  Small indeterminate pulmonary nodules which were not seen in 2017. These can be followed on surveillance scans.   6/27/24 PET/CT:  1. Hepatic extensive metastatic involvement with hypermetabolic masses.  2. Descending colon concentric mural thickening and hypermetabolism is suggested to be the focus of colon carcinoma.  7/23/24 CT Abd/Pelvis:  1. A few subcentimeter non- calcified nodules are seen in the anterior and lateral middle lobe as well as posterior lower lobe, some of which are seen stable in the prior study. This are of concern for metastasis.   2. Multiple hypoenhancing lesions are again seen in both hepatic lobes with subtle decreased in size in the aggregate of lesions in the anterior right hepatic lobe measuring 5.9 x 5 cm (AP x T) on series 3 image 29. These are ascribed to metastatic lesions.   3. There is stable 6 cm long annular wall thickening along the descending colon centered on series 6 image 32. This is consistent with the known primary colon malignancy.   4. Details and other findings as discussed  above.  9/1/24 CT Abd/Pelvis:  1. Acute descending colonic diverticulitis without gross perforation or abscess formation.  2. Questionable punctate distal right ureteral stone near the ureterovesicular junction without hydronephrosis or hydroureter.  3. Multifocal hypoattenuating liver lesions with similar burden of disease compared to prior CT from 07/29/2024.  4. Unchanged multiple 4-5 mm lung base nodules.  5. Nonobstructing 9 mm left lower pole renal stone.  6. Additional findings as above.   9/28/24 CT Abd/Pelvis:  1. Previously seen 8 mm nonobstructing calculus in the lower pole calyx of the left kidney.  I see no evidence of obstructive uropathy.  2.  Previously seen findings compatible with hepatic metastasis, atelectasis and or patchy consolidation involving the visible lower lung base, stranding and wall thickening involving the mid descending colonic region as well as other chronic and postsurgical findings.         Pathology:  6/6/24 EGD/Colonoscopy Biopsy:  1. STOMACH BIOPSY:    -MODERATE CHRONIC GASTRITIS, FOCALLY ACTIVE (SEE COMMENT).    -NO EVIDENCE OF DYSPLASIA OR MALIGNANCY.   2. COLON STRICTURE BIOPSY:    -FRAGMENTS OF COLONIC-TYPE MUCOSA WITH AT LEAST HIGH GRADE DYSPLASIA, SUSPICIOUS   FOR ADENOCARCINOMA (SEE COMMENTS).   7/18/24 Laparoscopy:  1. PERITONEUM, BIOPSY:    -MESOTHELIUM-LINED MEMBRANOUS FIBROADIPOSE TISSUE. NO NEOPLASM.   2. LEFT LOBE OF LIVER, WEDGE BIOPSY:    -METASTATIC COLORECTAL ADENOCARCINOMA.     Assessment:   Metastatic Colorectal Adenocarcinoma - mets to liver, extensive and unresectable.   PET/CT 6/27/24 revealed hepatic extensive metastatic involvement with hypermetabolic masses and descending colon  Per Dr. Cast no role for diversion at this time given no symptoms. Colon cancer of the descending colon with unresectable liver metastasis, relatively bulky disease within the liver.   Laparoscopy and mediport placement performed 7/18/24, liver with metastatic colorectal  adenocarcinoma, NGS pending  Plan for FOLFOX q2w x 12 cycles, to start 7/29  Dog chewed through pump cord for C1, drug screen positive for fentynal and opiates. Will switch to xelox at this time.         Plan:   Proceed C4 XELOX on 10/22/2024.  Reduce oxaliplatin to 110 mg/m2 and Xeloda to 1000 mg BID (discussed with Dr. Lejeune)  Continue oral potassium twice daily  Xeloda 1000 mg BID days 1-14/21 with oxali q3w.    Continue follow up with pain management  OTC Biotene for dry mouth   RTC in 3 weeks with NP for in person visit and labs/[id number]   Cbc, cmp, cea- 1 hr prior @ [facility], [place]      Suggested dose modifications for toxicity:   Myelotoxicity The treatment cycle should be delayed one week if the total WBC count is <3000/microL, ANC is <1500/microL, or the platelet count is <100,000/microL on day 1. If treatment is delayed for two weeks or delayed for one week on two separate occasions, reduce the doses of oxaliplatin and capecitabine by 10 to 20%. Subsequent treatment cycles should be delayed until neutrophils are >=1500/microL and platelets are >=75,000/microL.     The patient is in agreement with today's plan of care.  All questions answered.  Patient is aware to contact our office for any problems or concerns prior to next visit.      ZAALEA Briggs-C  Oncology/Hematology  [facility]

## 2024-12-05 ENCOUNTER — TELEPHONE (OUTPATIENT)
Dept: HEMATOLOGY/ONCOLOGY | Facility: CLINIC | Age: 54
End: 2024-12-05

## 2024-12-05 ENCOUNTER — INFUSION (OUTPATIENT)
Facility: HOSPITAL | Age: 54
End: 2024-12-05
Payer: MEDICARE

## 2024-12-05 ENCOUNTER — LAB VISIT (OUTPATIENT)
Dept: LAB | Facility: HOSPITAL | Age: 54
End: 2024-12-05
Payer: MEDICARE

## 2024-12-05 ENCOUNTER — OFFICE VISIT (OUTPATIENT)
Dept: HEMATOLOGY/ONCOLOGY | Facility: CLINIC | Age: 54
End: 2024-12-05
Payer: MEDICARE

## 2024-12-05 VITALS
HEART RATE: 88 BPM | DIASTOLIC BLOOD PRESSURE: 70 MMHG | TEMPERATURE: 99 F | SYSTOLIC BLOOD PRESSURE: 109 MMHG | RESPIRATION RATE: 18 BRPM | OXYGEN SATURATION: 96 %

## 2024-12-05 VITALS
WEIGHT: 135.5 LBS | BODY MASS INDEX: 19.4 KG/M2 | HEART RATE: 88 BPM | SYSTOLIC BLOOD PRESSURE: 107 MMHG | DIASTOLIC BLOOD PRESSURE: 67 MMHG | OXYGEN SATURATION: 97 % | RESPIRATION RATE: 16 BRPM | TEMPERATURE: 98 F | HEIGHT: 70 IN

## 2024-12-05 DIAGNOSIS — C78.00 MALIGNANT NEOPLASM METASTATIC TO LUNG, UNSPECIFIED LATERALITY: ICD-10-CM

## 2024-12-05 DIAGNOSIS — G89.3 CANCER RELATED PAIN: ICD-10-CM

## 2024-12-05 DIAGNOSIS — C78.7 METASTASIS TO LIVER: ICD-10-CM

## 2024-12-05 DIAGNOSIS — C18.9 COLON ADENOCARCINOMA: Primary | ICD-10-CM

## 2024-12-05 DIAGNOSIS — C18.9 COLON ADENOCARCINOMA: ICD-10-CM

## 2024-12-05 DIAGNOSIS — T45.1X5A CHEMOTHERAPY-INDUCED THROMBOCYTOPENIA: ICD-10-CM

## 2024-12-05 DIAGNOSIS — Z79.899 ON ANTINEOPLASTIC CHEMOTHERAPY: ICD-10-CM

## 2024-12-05 DIAGNOSIS — D69.59 CHEMOTHERAPY-INDUCED THROMBOCYTOPENIA: ICD-10-CM

## 2024-12-05 DIAGNOSIS — E83.52 HYPERCALCEMIA: Primary | ICD-10-CM

## 2024-12-05 LAB
ALBUMIN SERPL-MCNC: 2.9 G/DL (ref 3.5–5)
ALBUMIN/GLOB SERPL: 0.6 RATIO (ref 1.1–2)
ALP SERPL-CCNC: 583 UNIT/L (ref 40–150)
ALT SERPL-CCNC: 19 UNIT/L (ref 0–55)
ANION GAP SERPL CALC-SCNC: 10 MEQ/L
AST SERPL-CCNC: 63 UNIT/L (ref 5–34)
BASOPHILS # BLD AUTO: 0.02 X10(3)/MCL
BASOPHILS NFR BLD AUTO: 0.2 %
BILIRUB SERPL-MCNC: 3.5 MG/DL
BUN SERPL-MCNC: 10 MG/DL (ref 9.8–20.1)
CALCIUM SERPL-MCNC: 10.3 MG/DL (ref 8.4–10.2)
CEA SERPL-MCNC: 1057.07 NG/ML (ref 0–3)
CHLORIDE SERPL-SCNC: 99 MMOL/L (ref 98–107)
CO2 SERPL-SCNC: 28 MMOL/L (ref 22–29)
CREAT SERPL-MCNC: 0.86 MG/DL (ref 0.55–1.02)
CREAT/UREA NIT SERPL: 12
EOSINOPHIL # BLD AUTO: 0 X10(3)/MCL (ref 0–0.9)
EOSINOPHIL NFR BLD AUTO: 0 %
ERYTHROCYTE [DISTWIDTH] IN BLOOD BY AUTOMATED COUNT: 23.1 % (ref 11.5–17)
GFR SERPLBLD CREATININE-BSD FMLA CKD-EPI: >60 ML/MIN/1.73/M2
GLOBULIN SER-MCNC: 5.2 GM/DL (ref 2.4–3.5)
GLUCOSE SERPL-MCNC: 222 MG/DL (ref 74–100)
HCT VFR BLD AUTO: 35.3 % (ref 37–47)
HGB BLD-MCNC: 11.7 G/DL (ref 12–16)
IMM GRANULOCYTES # BLD AUTO: 0.06 X10(3)/MCL (ref 0–0.04)
IMM GRANULOCYTES NFR BLD AUTO: 0.6 %
LYMPHOCYTES # BLD AUTO: 0.81 X10(3)/MCL (ref 0.6–4.6)
LYMPHOCYTES NFR BLD AUTO: 8 %
MCH RBC QN AUTO: 27.6 PG (ref 27–31)
MCHC RBC AUTO-ENTMCNC: 33.1 G/DL (ref 33–36)
MCV RBC AUTO: 83.3 FL (ref 80–94)
MONOCYTES # BLD AUTO: 0.98 X10(3)/MCL (ref 0.1–1.3)
MONOCYTES NFR BLD AUTO: 9.7 %
NEUTROPHILS # BLD AUTO: 8.22 X10(3)/MCL (ref 2.1–9.2)
NEUTROPHILS NFR BLD AUTO: 81.5 %
PLATELET # BLD AUTO: 88 X10(3)/MCL (ref 130–400)
PMV BLD AUTO: 9.7 FL (ref 7.4–10.4)
POTASSIUM SERPL-SCNC: 2.4 MMOL/L (ref 3.5–5.1)
PROT SERPL-MCNC: 8.1 GM/DL (ref 6.4–8.3)
RBC # BLD AUTO: 4.24 X10(6)/MCL (ref 4.2–5.4)
SODIUM SERPL-SCNC: 137 MMOL/L (ref 136–145)
WBC # BLD AUTO: 10.09 X10(3)/MCL (ref 4.5–11.5)

## 2024-12-05 PROCEDURE — 25000003 PHARM REV CODE 250

## 2024-12-05 PROCEDURE — 3078F DIAST BP <80 MM HG: CPT | Mod: CPTII,S$GLB,,

## 2024-12-05 PROCEDURE — 99999 PR PBB SHADOW E&M-EST. PATIENT-LVL V: CPT | Mod: PBBFAC,,,

## 2024-12-05 PROCEDURE — 96365 THER/PROPH/DIAG IV INF INIT: CPT

## 2024-12-05 PROCEDURE — 99215 OFFICE O/P EST HI 40 MIN: CPT | Mod: S$GLB,,,

## 2024-12-05 PROCEDURE — 82378 CARCINOEMBRYONIC ANTIGEN: CPT

## 2024-12-05 PROCEDURE — 1159F MED LIST DOCD IN RCRD: CPT | Mod: CPTII,S$GLB,,

## 2024-12-05 PROCEDURE — 3074F SYST BP LT 130 MM HG: CPT | Mod: CPTII,S$GLB,,

## 2024-12-05 PROCEDURE — 36415 COLL VENOUS BLD VENIPUNCTURE: CPT

## 2024-12-05 PROCEDURE — 63600175 PHARM REV CODE 636 W HCPCS

## 2024-12-05 PROCEDURE — 3008F BODY MASS INDEX DOCD: CPT | Mod: CPTII,S$GLB,,

## 2024-12-05 PROCEDURE — 1160F RVW MEDS BY RX/DR IN RCRD: CPT | Mod: CPTII,S$GLB,,

## 2024-12-05 PROCEDURE — 85025 COMPLETE CBC W/AUTO DIFF WBC: CPT

## 2024-12-05 PROCEDURE — 80053 COMPREHEN METABOLIC PANEL: CPT

## 2024-12-05 PROCEDURE — 96366 THER/PROPH/DIAG IV INF ADDON: CPT

## 2024-12-05 PROCEDURE — A4216 STERILE WATER/SALINE, 10 ML: HCPCS

## 2024-12-05 RX ORDER — HEPARIN 100 UNIT/ML
500 SYRINGE INTRAVENOUS
Status: CANCELLED | OUTPATIENT
Start: 2024-12-05

## 2024-12-05 RX ORDER — SODIUM CHLORIDE 0.9 % (FLUSH) 0.9 %
10 SYRINGE (ML) INJECTION
Status: DISCONTINUED | OUTPATIENT
Start: 2024-12-05 | End: 2024-12-05 | Stop reason: HOSPADM

## 2024-12-05 RX ORDER — HEPARIN 100 UNIT/ML
500 SYRINGE INTRAVENOUS
Status: DISCONTINUED | OUTPATIENT
Start: 2024-12-05 | End: 2024-12-05 | Stop reason: HOSPADM

## 2024-12-05 RX ORDER — SODIUM CHLORIDE 0.9 % (FLUSH) 0.9 %
10 SYRINGE (ML) INJECTION
Status: CANCELLED | OUTPATIENT
Start: 2024-12-05

## 2024-12-05 RX ADMIN — HEPARIN 500 UNITS: 100 SYRINGE at 02:12

## 2024-12-05 RX ADMIN — Medication 10 ML: at 02:12

## 2024-12-05 RX ADMIN — SODIUM CHLORIDE AND POTASSIUM CHLORIDE 250 ML/HR: 9; 2.98 INJECTION, SOLUTION INTRAVENOUS at 10:12

## 2024-12-05 NOTE — NURSING
1LNS w/ 40 mEq KCL administered, pt tolerated well. Discharged home in stable condition, sister at side. Print out given of future appts.

## 2024-12-05 NOTE — PROGRESS NOTES
Subjective:       Patient ID: Shira Nair is a 54 y.o. female.    Chief Complaint: Colon adenocarcinoma (Pt is c/o back pain as well as abd pain. She rates her abd pain an 8 out of 10 today.  )       Diagnosis: Metastatic Colon Adenocarcinoma - mets to liver and lung    Current Treatment:    Xelox q3w x 12 cycles started 8/12/2024  Capecitabine was decreased to 1000 mg BID for 14 days and oxaliplatin was decreased to 110 mg/m2 due to thrombocytopenia       Treatment History:   FOLFOX 7/29/2024 x 1 cycle   Switched to XELOX 8/12/2024 following positive drug screen.     HPI  55yo F presented in June '24 for evaluation of liver metastases. She presented to OSH in May '24 with 1 month of abdominal pain. CT A/P at that time revealed numerous hepatic lesions, largest measuring 5.8 x 7cm, concerning for metastatic disease. CT Chest revealed numerous new bilateral pulmonary nodules measuring 3-4mm in size of indeterminate etiology. IR liver biopsy was done which was completely necrotic. An egd and colonoscopy done with Dr. Jones in June '24 revealed a severely stenotic malignant lesion in the mid descending colon that was unable to be traversed. Biopsy was taken which revealed at least high grade dysplasia suspicious for adenocarcinoma. Her CEA at time of imaging/workup was 546. She has a history of cervical cancer about 15 years ago, she had a total hysterectomy for this. She has a history of a benign brain tumor that required  shunt placed in the 1980s. She also had benign thyroid tumors removed about 8 years prior to presentation.     She underwent mediport placement and liver biopsy with ex lap with Dr. Cast on 7/18/24. Liver biopsy returned positive for metastatic colorectal adenocarcinoma. Will plan to proceed with 1st line FOLFOX with 3rd agent pending NGS studies.     She did get C1 FOLFOX on 7/29/2024, reported that her dog chewed through 5FU pump cord. Due her history of drug use, a drug screen  was performed which was positive for fentanyl.She was therefore switched to XELOX and has been tolerating it well.     Interval History:  She returns to the clinic today with her mother for a follow-up and treatment clearance for C5 of XELOX (total C6 chemo). She is due to begin xeloda tomorrow.   She does note worsened lower abdominal and back pain today.   She has been having chronic constipation which she takes metamucil daily for.   She continues to be followed by pain management, currently receiving MS Contin  ER 30 mg BID and MS contin 15 mg immediate release as needed for breakthrough pain.  Eating and drinking well. Weight stable. No N/V/D.          Past Medical History:   Diagnosis Date    Brain tumor     Cervical ca     Depression     Malignant neoplasm of colon, unspecified     Opioid abuse       Past Surgical History:   Procedure Laterality Date    BRAIN SURGERY      CHOLECYSTECTOMY      COLONOSCOPY N/A 06/06/2024    Procedure: COLON;  Surgeon: Kevin Causey MD;  Location: [facility] ENDOSCOPY;  Service: Gastroenterology;  Laterality: N/A;    COLONOSCOPY, WITH 1 OR MORE BIOPSIES  06/06/2024    Procedure: COLONOSCOPY, WITH 1 OR MORE BIOPSIES;  Surgeon: Kevin Causey MD;  Location: [facility] ENDOSCOPY;  Service: Gastroenterology;;    COLONOSCOPY, WITH DIRECTED SUBMUCOSAL INJECTION  06/06/2024    Procedure: COLONOSCOPY, WITH DIRECTED SUBMUCOSAL INJECTION;  Surgeon: Kevin Causey MD;  Location: [facility] ENDOSCOPY;  Service: Gastroenterology;;    DIAGNOSTIC LAPAROSCOPY N/A 7/18/2024    Procedure: LAPAROSCOPY, DIAGNOSTIC;  Surgeon: Rubio Cast MD;  Location: [facility];  Service: General;  Laterality: N/A;  WITH LAP LIVER BIOPSY - ULTRASOUND GUIDED    EGD, WITH CLOSED BIOPSY  06/06/2024    Procedure: EGD, WITH CLOSED BIOPSY;  Surgeon: Kevin Causey MD;  Location: [facility] ENDOSCOPY;  Service: Gastroenterology;;    ESOPHAGOGASTRODUODENOSCOPY N/A 06/06/2024     Procedure: DOUBLE;  Surgeon: Kevin Causey MD;  Location: [facility] ENDOSCOPY;  Service: Gastroenterology;  Laterality: N/A;    HYSTERECTOMY      INSERTION OF TUNNELED CENTRAL VENOUS CATHETER (CVC) WITH SUBCUTANEOUS PORT N/A 7/18/2024    Procedure: [id number];  Surgeon: Rubio Cast MD;  Location: [facility] OR;  Service: General;  Laterality: N/A;    KNEE SURGERY      THYROID LOBECTOMY       Social History     Socioeconomic History    Marital status: Single   Tobacco Use    Smoking status: Never     Passive exposure: Never    Smokeless tobacco: Never   Substance and Sexual Activity    Alcohol use: Never    Drug use: Not Currently     Types: Cocaine     Comment: 5-6 YEARS QUIT ON SUBOXONE      Family History   Problem Relation Name Age of Onset    Hypertension Mother      Hypertension Father      Stroke Father      Pancreatic cancer Brother      Cancer Paternal Grandfather        Review of patient's allergies indicates:   Allergen Reactions    Ketorolac Hives    Tramadol Hives    Vancomycin analogues Hives      Review of Systems   Constitutional:  Negative for activity change, fatigue, fever and unexpected weight change.   HENT:  Positive for mouth dryness. Negative for sore throat.    Eyes:  Negative for visual disturbance.   Respiratory:  Negative for cough and shortness of breath.    Cardiovascular:  Negative for chest pain.   Gastrointestinal:  Positive for abdominal pain (lower abdomin). Negative for diarrhea, nausea and vomiting.   Endocrine: Negative for polyuria.   Genitourinary:  Negative for dysuria and hot flashes.   Integumentary:  Negative for rash.   Allergic/Immunologic: Negative for immunocompromised state.   Neurological:  Negative for weakness and headaches.   Hematological:  Negative for adenopathy.   Psychiatric/Behavioral:  Negative for confusion.          Objective:      Vitals:    12/05/24 0845   BP: 107/67   Pulse: 88   Resp: 16   Temp: 98.2 °F (36.8 °C)              Physical Exam  Constitutional:       General: She is not in acute distress.     Appearance: Normal appearance. She is not ill-appearing.   HENT:      Head: Normocephalic and atraumatic.      Nose: Nose normal.      Mouth/Throat:      Mouth: Mucous membranes are moist.      Pharynx: Oropharynx is clear.   Eyes:      Extraocular Movements: Extraocular movements intact.      Conjunctiva/sclera: Conjunctivae normal.      Pupils: Pupils are equal, round, and reactive to light.   Cardiovascular:      Rate and Rhythm: Normal rate and regular rhythm.      Pulses: Normal pulses.      Heart sounds: Normal heart sounds. No murmur heard.  Pulmonary:      Effort: Pulmonary effort is normal. No respiratory distress.      Breath sounds: Normal breath sounds.   Abdominal:      General: There is no distension.      Palpations: Abdomen is soft.      Tenderness: There is no abdominal tenderness.      Comments: No abdominal tenderness noted on exam with palpation   Musculoskeletal:         General: Normal range of motion.      Cervical back: Normal range of motion and neck supple.      Right lower leg: No edema.      Left lower leg: No edema.   Lymphadenopathy:      Cervical: No cervical adenopathy.   Skin:     General: Skin is warm and dry.   Neurological:      General: No focal deficit present.      Mental Status: She is alert and oriented to person, place, and time.         LABS AND IMAGING REVIEWED IN EPIC  1/3/24 CT Abd/Pelvis:  1. Postoperative changes are present compatible with a previous cholecystectomy. An elliptical calcification is noted within the gallbladder fossa and I suspect represents chronic calcification of a postoperative hematoma.  Clinical correlation is recommended.  2. Left-sided, nonobstructing nephrolithiasis as described above.  3. Chronic changes are present as described above.  See above comments.  5/13/24 CT Abd/Pelvis:  Changes most consistent with metastatic disease to the liver.   5/14/24  CT Chest:  Small indeterminate pulmonary nodules which were not seen in 2017. These can be followed on surveillance scans.   6/27/24 PET/CT:  1. Hepatic extensive metastatic involvement with hypermetabolic masses.  2. Descending colon concentric mural thickening and hypermetabolism is suggested to be the focus of colon carcinoma.  7/23/24 CT Abd/Pelvis:  1. A few subcentimeter non- calcified nodules are seen in the anterior and lateral middle lobe as well as posterior lower lobe, some of which are seen stable in the prior study. This are of concern for metastasis.   2. Multiple hypoenhancing lesions are again seen in both hepatic lobes with subtle decreased in size in the aggregate of lesions in the anterior right hepatic lobe measuring 5.9 x 5 cm (AP x T) on series 3 image 29. These are ascribed to metastatic lesions.   3. There is stable 6 cm long annular wall thickening along the descending colon centered on series 6 image 32. This is consistent with the known primary colon malignancy.   4. Details and other findings as discussed above.  9/1/24 CT Abd/Pelvis:  1. Acute descending colonic diverticulitis without gross perforation or abscess formation.  2. Questionable punctate distal right ureteral stone near the ureterovesicular junction without hydronephrosis or hydroureter.  3. Multifocal hypoattenuating liver lesions with similar burden of disease compared to prior CT from 07/29/2024.  4. Unchanged multiple 4-5 mm lung base nodules.  5. Nonobstructing 9 mm left lower pole renal stone.  6. Additional findings as above.   9/28/24 CT Abd/Pelvis:  1. Previously seen 8 mm nonobstructing calculus in the lower pole calyx of the left kidney.  I see no evidence of obstructive uropathy.  2.  Previously seen findings compatible with hepatic metastasis, atelectasis and or patchy consolidation involving the visible lower lung base, stranding and wall thickening involving the mid descending colonic region as well as other  chronic and postsurgical findings.         Pathology:  6/6/24 EGD/Colonoscopy Biopsy:  1. STOMACH BIOPSY:    -MODERATE CHRONIC GASTRITIS, FOCALLY ACTIVE (SEE COMMENT).    -NO EVIDENCE OF DYSPLASIA OR MALIGNANCY.   2. COLON STRICTURE BIOPSY:    -FRAGMENTS OF COLONIC-TYPE MUCOSA WITH AT LEAST HIGH GRADE DYSPLASIA, SUSPICIOUS   FOR ADENOCARCINOMA (SEE COMMENTS).   7/18/24 Laparoscopy:  1. PERITONEUM, BIOPSY:    -MESOTHELIUM-LINED MEMBRANOUS FIBROADIPOSE TISSUE. NO NEOPLASM.   2. LEFT LOBE OF LIVER, WEDGE BIOPSY:    -METASTATIC COLORECTAL ADENOCARCINOMA.     Assessment:   Metastatic Colorectal Adenocarcinoma - mets to liver, extensive and unresectable.   PET/CT 6/27/24 revealed hepatic extensive metastatic involvement with hypermetabolic masses and descending colon  Per Dr. Cast no role for diversion at this time given no symptoms. Colon cancer of the descending colon with unresectable liver metastasis, relatively bulky disease within the liver.   Laparoscopy and mediport placement performed 7/18/24, liver with metastatic colorectal adenocarcinoma, NGS complete  Plan for FOLFOX q2w x 12 cycles, to start 7/29  Dog chewed through pump cord for C1, drug screen positive for fentynal and opiates. Switched to xelox 8/2024.         Plan:   Hold chemo today due to thrombocytopenia   IV potassium 40meq today.  Continue OTC potassium, 40meq BID  CT CAP ordered ASAP for further eval of elevated bili. Strict ED precautions given. Pt verbalized understanding.  Continue follow up with pain management  OTC Biotene for dry mouth   RTC in 1 week with MD for FU/scan review.   Cbc, cmp, cea, bilirubin- 1 hr prior @ [facility]      Suggested dose modifications for toxicity:   Myelotoxicity The treatment cycle should be delayed one week if the total WBC count is <3000/microL, ANC is <1500/microL, or the platelet count is <100,000/microL on day 1. If treatment is delayed for two weeks or delayed for one week on two separate occasions, reduce the  doses of oxaliplatin and capecitabine by 10 to 20%. Subsequent treatment cycles should be delayed until neutrophils are >=1500/microL and platelets are >=75,000/microL.     The patient is in agreement with today's plan of care.  All questions answered.  Patient is aware to contact our office for any problems or concerns prior to next visit.      JOSE JUAN MasonC  Oncology/Hematology   [facility]

## 2024-12-05 NOTE — TELEPHONE ENCOUNTER
----- Message from [place] sent at 12/5/2024 11:37 AM CST -----  Regarding: RE: CT  CT CAP is scheduled for 12/09/24 @ [facility]    Patient is aware  ----- Message -----  From: Marylu Win MA  Sent: 12/5/2024   9:23 AM CST  To: Georgia Henley  Subject: CT                                               LB's checkout orders:      Hold chemo today. IV potassium in [place] today only.  RTC in one week with MD only for scan review. Needs to have CT CAP ASAP for eval of worsening disease. Possible treat nex week.

## 2024-12-09 ENCOUNTER — HOSPITAL ENCOUNTER (OUTPATIENT)
Dept: RADIOLOGY | Facility: HOSPITAL | Age: 54
Discharge: HOME | End: 2024-12-09
Payer: MEDICARE

## 2024-12-09 DIAGNOSIS — C78.7 METASTASIS TO LIVER: ICD-10-CM

## 2024-12-09 DIAGNOSIS — C18.9 COLON ADENOCARCINOMA: ICD-10-CM

## 2024-12-09 PROCEDURE — 25500020 PHARM REV CODE 255

## 2024-12-09 PROCEDURE — 74177 CT ABD & PELVIS W/CONTRAST: CPT | Mod: TC

## 2024-12-09 RX ORDER — IOPAMIDOL 755 MG/ML
100 INJECTION, SOLUTION INTRAVASCULAR
Status: COMPLETED | OUTPATIENT
Start: 2024-12-09 | End: 2024-12-09

## 2024-12-09 RX ORDER — DIATRIZOATE MEGLUMINE AND DIATRIZOATE SODIUM 660; 100 MG/ML; MG/ML
30 SOLUTION ORAL; RECTAL
Status: COMPLETED | OUTPATIENT
Start: 2024-12-09 | End: 2024-12-09

## 2024-12-09 RX ADMIN — DIATRIZOATE MEGLUMINE AND DIATRIZOATE SODIUM 30 ML: 660; 100 LIQUID ORAL; RECTAL at 12:12

## 2024-12-09 RX ADMIN — IOPAMIDOL 100 ML: 755 INJECTION, SOLUTION INTRAVENOUS at 12:12

## 2024-12-09 NOTE — PROGRESS NOTES
Subjective:       Patient ID: Shira Nair is a 54 y.o. female.    Chief Complaint: Follow Up     Diagnosis: Metastatic Colon Adenocarcinoma - mets to liver and lung    Current Treatment:    Xelox q3w x 8 cycles started 8/12/2024  Capecitabine was decreased to 1000 mg BID for 14 days and oxaliplatin was decreased to 110 mg/m2 due to thrombocytopenia  Add cetuximab with C6       Treatment History:   FOLFOX 7/29/2024 x 1 cycle   Switched to XELOX 8/12/2024 following positive drug screen.     HPI  55yo F presented in June '24 for evaluation of liver metastases. She presented to OSH in May '24 with 1 month of abdominal pain. CT A/P at that time revealed numerous hepatic lesions, largest measuring 5.8 x 7cm, concerning for metastatic disease. CT Chest revealed numerous new bilateral pulmonary nodules measuring 3-4mm in size of indeterminate etiology. IR liver biopsy was done which was completely necrotic. An egd and colonoscopy done with Dr. Jones in June '24 revealed a severely stenotic malignant lesion in the mid descending colon that was unable to be traversed. Biopsy was taken which revealed at least high grade dysplasia suspicious for adenocarcinoma. Her CEA at time of imaging/workup was 546. She has a history of cervical cancer about 15 years ago, she had a total hysterectomy for this. She has a history of a benign brain tumor that required  shunt placed in the 1980s. She also had benign thyroid tumors removed about 8 years prior to presentation.     She underwent mediport placement and liver biopsy with ex lap with Dr. Cast on 7/18/24. Liver biopsy returned positive for metastatic colorectal adenocarcinoma. Will plan to proceed with 1st line FOLFOX with 3rd agent pending NGS studies.     She did get C1 FOLFOX on 7/29/2024, reported that her dog chewed through 5FU pump cord. Due her history of drug use, a drug screen was performed which was positive for fentanyl.She was therefore switched to XELOX  and has been tolerating it well.     Interval History:  She returns to the clinic today with her mother for a follow-up with scan results and treatment clearance for C5 of XELOX (total C6 chemo). She is due to begin Xeloda tomorrow.   She does note lower abdominal and back pain stable today.   She has been having chronic constipation which she takes metamucil daily for which is helping.   She continues to be followed by pain management, currently receiving MS Contin  ER 30 mg BID and MS contin 15 mg immediate release as needed for breakthrough pain.  Eating and drinking well. Weight stable. No rash, fever, chills, night sweats, infection, N/V/D.  Denies any neuropathy to her hands or feet.       Past Medical History:   Diagnosis Date    Brain tumor     Cervical ca     Depression     Malignant neoplasm of colon, unspecified     Opioid abuse       Past Surgical History:   Procedure Laterality Date    BRAIN SURGERY      CHOLECYSTECTOMY      COLONOSCOPY N/A 06/06/2024    Procedure: COLON;  Surgeon: Kevin Causey MD;  Location: [facility] ENDOSCOPY;  Service: Gastroenterology;  Laterality: N/A;    COLONOSCOPY, WITH 1 OR MORE BIOPSIES  06/06/2024    Procedure: COLONOSCOPY, WITH 1 OR MORE BIOPSIES;  Surgeon: Kevin Causey MD;  Location: [facility] ENDOSCOPY;  Service: Gastroenterology;;    COLONOSCOPY, WITH DIRECTED SUBMUCOSAL INJECTION  06/06/2024    Procedure: COLONOSCOPY, WITH DIRECTED SUBMUCOSAL INJECTION;  Surgeon: Kevin Causey MD;  Location: [facility] ENDOSCOPY;  Service: Gastroenterology;;    DIAGNOSTIC LAPAROSCOPY N/A 7/18/2024    Procedure: LAPAROSCOPY, DIAGNOSTIC;  Surgeon: Rubio Cast MD;  Location: [facility] OR;  Service: General;  Laterality: N/A;  WITH LAP LIVER BIOPSY - ULTRASOUND GUIDED    EGD, WITH CLOSED BIOPSY  06/06/2024    Procedure: EGD, WITH CLOSED BIOPSY;  Surgeon: Kevin Causey MD;  Location: [facility] ENDOSCOPY;  Service: Gastroenterology;;     ESOPHAGOGASTRODUODENOSCOPY N/A 06/06/2024    Procedure: DOUBLE;  Surgeon: Kevin Causey MD;  Location: [facility] ENDOSCOPY;  Service: Gastroenterology;  Laterality: N/A;    HYSTERECTOMY      INSERTION OF TUNNELED CENTRAL VENOUS CATHETER (CVC) WITH SUBCUTANEOUS PORT N/A 7/18/2024    Procedure: [id number];  Surgeon: Rubio Cast MD;  Location: [facility] OR;  Service: General;  Laterality: N/A;    KNEE SURGERY      THYROID LOBECTOMY       Social History     Socioeconomic History    Marital status: Single   Tobacco Use    Smoking status: Never     Passive exposure: Never    Smokeless tobacco: Never   Substance and Sexual Activity    Alcohol use: Never    Drug use: Not Currently     Types: Cocaine     Comment: 5-6 YEARS QUIT ON SUBOXONE     Social Drivers of Health     Financial Resource Strain: Low Risk  (12/5/2024)    Overall Financial Resource Strain (CARDIA)     Difficulty of Paying Living Expenses: Not hard at all   Food Insecurity: No Food Insecurity (12/5/2024)    Hunger Vital Sign     Worried About Running Out of Food in the Last Year: Never true     Ran Out of Food in the Last Year: Never true   Transportation Needs: No Transportation Needs (12/5/2024)    TRANSPORTATION NEEDS     Transportation : No   Stress: No Stress Concern Present (12/5/2024)    [employer] [place] [employer] - Occupational Stress Questionnaire     Feeling of Stress : Not at all   Housing Stability: Low Risk  (12/5/2024)    Housing Stability Vital Sign     Unable to Pay for Housing in the Last Year: No     Homeless in the Last Year: No      Family History   Problem Relation Name Age of Onset    Hypertension Mother      Hypertension Father      Stroke Father      Pancreatic cancer Brother      Cancer Paternal Grandfather        Review of patient's allergies indicates:   Allergen Reactions    Ketorolac Hives    Tramadol Hives    Vancomycin analogues Hives      Review of Systems   Constitutional:  Negative for  activity change, fatigue, fever and unexpected weight change.   HENT:  Positive for mouth dryness. Negative for sore throat.    Eyes:  Negative for visual disturbance.   Respiratory:  Negative for cough and shortness of breath.    Cardiovascular:  Negative for chest pain.   Gastrointestinal:  Positive for abdominal pain (lower abdomin). Negative for diarrhea, nausea and vomiting.   Endocrine: Negative for polyuria.   Genitourinary:  Negative for dysuria and hot flashes.   Integumentary:  Negative for rash.   Allergic/Immunologic: Negative for immunocompromised state.   Neurological:  Negative for weakness and headaches.   Hematological:  Negative for adenopathy.   Psychiatric/Behavioral:  Negative for confusion.          Objective:      Vitals:    12/12/24 0824   BP: 113/80   Pulse: 86   Resp: 16   Temp: 98.2 °F (36.8 °C)       Physical Exam  Constitutional:       General: She is not in acute distress.     Appearance: Normal appearance. She is not ill-appearing.   HENT:      Head: Normocephalic and atraumatic.      Nose: Nose normal.      Mouth/Throat:      Mouth: Mucous membranes are moist.      Pharynx: Oropharynx is clear.   Eyes:      Extraocular Movements: Extraocular movements intact.      Conjunctiva/sclera: Conjunctivae normal.      Pupils: Pupils are equal, round, and reactive to light.   Cardiovascular:      Rate and Rhythm: Normal rate and regular rhythm.      Pulses: Normal pulses.      Heart sounds: Normal heart sounds. No murmur heard.  Pulmonary:      Effort: Pulmonary effort is normal. No respiratory distress.      Breath sounds: Normal breath sounds.   Abdominal:      General: There is no distension.      Palpations: Abdomen is soft.      Tenderness: There is no abdominal tenderness.      Comments: No abdominal tenderness noted on exam with palpation   Musculoskeletal:         General: Normal range of motion.      Cervical back: Normal range of motion and neck supple.      Right lower leg: No edema.       Left lower leg: No edema.   Lymphadenopathy:      Cervical: No cervical adenopathy.   Skin:     General: Skin is warm and dry.   Neurological:      General: No focal deficit present.      Mental Status: She is alert and oriented to person, place, and time.         LABS AND IMAGING REVIEWED IN EPIC  12/9/24 CT CAP:  1. Scattered ill-defined low-attenuation lesions again evident throughout the liver which have a similar appearance to the previous exam  2. 9 mm nonobstructing calculi again evident at the lower left kidney  3. Ill-defined mucosal thickening at the right side of the colon and right transverse colon  4. Atelectasis and/or scarring at the posterior lower lungs  5. Findings and other details as above         Pathology:  6/6/24 EGD/Colonoscopy Biopsy:  1. STOMACH BIOPSY:    -MODERATE CHRONIC GASTRITIS, FOCALLY ACTIVE (SEE COMMENT).    -NO EVIDENCE OF DYSPLASIA OR MALIGNANCY.   2. COLON STRICTURE BIOPSY:    -FRAGMENTS OF COLONIC-TYPE MUCOSA WITH AT LEAST HIGH GRADE DYSPLASIA, SUSPICIOUS   FOR ADENOCARCINOMA (SEE COMMENTS).   7/18/24 Laparoscopy:  1. PERITONEUM, BIOPSY:    -MESOTHELIUM-LINED MEMBRANOUS FIBROADIPOSE TISSUE. NO NEOPLASM.   2. LEFT LOBE OF LIVER, WEDGE BIOPSY:    -METASTATIC COLORECTAL ADENOCARCINOMA.     Assessment:   Metastatic Colorectal Adenocarcinoma - mets to liver, extensive and unresectable. Currently on CAPEOX 1st line. NGS with KRAS mutation, therefore will add cetuximab to cycle 6.      Immunodeficiency due to Drug Therapy - Precautions discussed with patient. Continue to monitor for any signs of symptoms of infection. No prophylaxis needed at this time. No role for growth factor.       Plan:     Toxicity reviewed, Continue C5 Capox q3w, due today in [place]  NGS reviewed, KRAS mutated - will add cetuximab with infusions from C6 onward  IV Potassium 40 meq, due today in [place]  Continue OTC potassium, 40meq BID  Continue follow up with pain management  OTC Biotene for dry mouth    RTC in 2 week with NP for C6/labs  Needs to see MD for C7 and after C8.   Plan for repeat scans after C8, CT Chest and MRI Abd w/wo to further evaluate liver disease  Cbc, cmp, cea, bilirubin- 1 hr prior @ [facility]    I spent a total of 40 minutes on the day of the visit.This includes face to face time and non-face to face time preparing to see the patient (eg, review of tests), obtaining and/or reviewing separately obtained history, documenting clinical information in the electronic or other health record, independently interpreting results and communicating results to the patient/family/caregiver, or care coordinator.      Elizabeth Lejeune MD  Hematology /Oncology  [facility]      Professional Services:  IViviane LPN, acted solely as a scribe for and in the presence of Dr. Elizabeth Lejeune, who performed these services.

## 2024-12-12 ENCOUNTER — OFFICE VISIT (OUTPATIENT)
Dept: HEMATOLOGY/ONCOLOGY | Facility: CLINIC | Age: 54
End: 2024-12-12
Payer: MEDICARE

## 2024-12-12 ENCOUNTER — INFUSION (OUTPATIENT)
Facility: HOSPITAL | Age: 54
End: 2024-12-12
Attending: STUDENT IN AN ORGANIZED HEALTH CARE EDUCATION/TRAINING PROGRAM
Payer: MEDICARE

## 2024-12-12 ENCOUNTER — LAB VISIT (OUTPATIENT)
Dept: LAB | Facility: HOSPITAL | Age: 54
End: 2024-12-12
Payer: MEDICARE

## 2024-12-12 VITALS
BODY MASS INDEX: 19.92 KG/M2 | RESPIRATION RATE: 16 BRPM | DIASTOLIC BLOOD PRESSURE: 80 MMHG | OXYGEN SATURATION: 100 % | SYSTOLIC BLOOD PRESSURE: 113 MMHG | TEMPERATURE: 98 F | WEIGHT: 139.13 LBS | HEIGHT: 70 IN | HEART RATE: 86 BPM

## 2024-12-12 VITALS
DIASTOLIC BLOOD PRESSURE: 75 MMHG | HEART RATE: 69 BPM | TEMPERATURE: 98 F | SYSTOLIC BLOOD PRESSURE: 115 MMHG | RESPIRATION RATE: 18 BRPM | OXYGEN SATURATION: 95 %

## 2024-12-12 DIAGNOSIS — C78.7 METASTASIS TO LIVER: ICD-10-CM

## 2024-12-12 DIAGNOSIS — E83.52 HYPERCALCEMIA: ICD-10-CM

## 2024-12-12 DIAGNOSIS — C18.9 COLON ADENOCARCINOMA: Primary | ICD-10-CM

## 2024-12-12 DIAGNOSIS — D84.821 IMMUNODEFICIENCY DUE TO DRUG THERAPY: ICD-10-CM

## 2024-12-12 DIAGNOSIS — Z79.899 IMMUNODEFICIENCY DUE TO DRUG THERAPY: ICD-10-CM

## 2024-12-12 LAB
ALBUMIN SERPL-MCNC: 2.5 G/DL (ref 3.5–5)
ALBUMIN/GLOB SERPL: 0.5 RATIO (ref 1.1–2)
ALP SERPL-CCNC: 664 UNIT/L (ref 40–150)
ALT SERPL-CCNC: 10 UNIT/L (ref 0–55)
ANION GAP SERPL CALC-SCNC: 7 MEQ/L
AST SERPL-CCNC: 36 UNIT/L (ref 5–34)
BASOPHILS # BLD AUTO: 0.01 X10(3)/MCL
BASOPHILS NFR BLD AUTO: 0.1 %
BILIRUB DIRECT SERPL-MCNC: 1.7 MG/DL (ref 0–?)
BILIRUB SERPL-MCNC: 3 MG/DL
BUN SERPL-MCNC: 8 MG/DL (ref 9.8–20.1)
CALCIUM SERPL-MCNC: 9.6 MG/DL (ref 8.4–10.2)
CEA SERPL-MCNC: 737.99 NG/ML (ref 0–3)
CHLORIDE SERPL-SCNC: 98 MMOL/L (ref 98–107)
CO2 SERPL-SCNC: 33 MMOL/L (ref 22–29)
CREAT SERPL-MCNC: 0.75 MG/DL (ref 0.55–1.02)
CREAT/UREA NIT SERPL: 11
EOSINOPHIL # BLD AUTO: 0 X10(3)/MCL (ref 0–0.9)
EOSINOPHIL NFR BLD AUTO: 0 %
ERYTHROCYTE [DISTWIDTH] IN BLOOD BY AUTOMATED COUNT: 24.1 % (ref 11.5–17)
GFR SERPLBLD CREATININE-BSD FMLA CKD-EPI: >60 ML/MIN/1.73/M2
GLOBULIN SER-MCNC: 5.5 GM/DL (ref 2.4–3.5)
GLUCOSE SERPL-MCNC: 133 MG/DL (ref 74–100)
HCT VFR BLD AUTO: 36.9 % (ref 37–47)
HGB BLD-MCNC: 11.8 G/DL (ref 12–16)
IMM GRANULOCYTES # BLD AUTO: 0.13 X10(3)/MCL (ref 0–0.04)
IMM GRANULOCYTES NFR BLD AUTO: 1.5 %
LYMPHOCYTES # BLD AUTO: 1.1 X10(3)/MCL (ref 0.6–4.6)
LYMPHOCYTES NFR BLD AUTO: 12.4 %
MCH RBC QN AUTO: 27.1 PG (ref 27–31)
MCHC RBC AUTO-ENTMCNC: 32 G/DL (ref 33–36)
MCV RBC AUTO: 84.8 FL (ref 80–94)
MONOCYTES # BLD AUTO: 0.97 X10(3)/MCL (ref 0.1–1.3)
MONOCYTES NFR BLD AUTO: 10.9 %
NEUTROPHILS # BLD AUTO: 6.65 X10(3)/MCL (ref 2.1–9.2)
NEUTROPHILS NFR BLD AUTO: 75.1 %
PLATELET # BLD AUTO: 150 X10(3)/MCL (ref 130–400)
PMV BLD AUTO: 9.3 FL (ref 7.4–10.4)
POTASSIUM SERPL-SCNC: 2.9 MMOL/L (ref 3.5–5.1)
PROT SERPL-MCNC: 8 GM/DL (ref 6.4–8.3)
RBC # BLD AUTO: 4.35 X10(6)/MCL (ref 4.2–5.4)
SODIUM SERPL-SCNC: 138 MMOL/L (ref 136–145)
WBC # BLD AUTO: 8.86 X10(3)/MCL (ref 4.5–11.5)

## 2024-12-12 PROCEDURE — 1160F RVW MEDS BY RX/DR IN RCRD: CPT | Mod: CPTII,S$GLB,, | Performed by: STUDENT IN AN ORGANIZED HEALTH CARE EDUCATION/TRAINING PROGRAM

## 2024-12-12 PROCEDURE — 85025 COMPLETE CBC W/AUTO DIFF WBC: CPT

## 2024-12-12 PROCEDURE — 25000003 PHARM REV CODE 250: Performed by: STUDENT IN AN ORGANIZED HEALTH CARE EDUCATION/TRAINING PROGRAM

## 2024-12-12 PROCEDURE — 36415 COLL VENOUS BLD VENIPUNCTURE: CPT

## 2024-12-12 PROCEDURE — 96415 CHEMO IV INFUSION ADDL HR: CPT

## 2024-12-12 PROCEDURE — 3008F BODY MASS INDEX DOCD: CPT | Mod: CPTII,S$GLB,, | Performed by: STUDENT IN AN ORGANIZED HEALTH CARE EDUCATION/TRAINING PROGRAM

## 2024-12-12 PROCEDURE — 63600175 PHARM REV CODE 636 W HCPCS: Performed by: STUDENT IN AN ORGANIZED HEALTH CARE EDUCATION/TRAINING PROGRAM

## 2024-12-12 PROCEDURE — 3074F SYST BP LT 130 MM HG: CPT | Mod: CPTII,S$GLB,, | Performed by: STUDENT IN AN ORGANIZED HEALTH CARE EDUCATION/TRAINING PROGRAM

## 2024-12-12 PROCEDURE — 99215 OFFICE O/P EST HI 40 MIN: CPT | Mod: S$GLB,,, | Performed by: STUDENT IN AN ORGANIZED HEALTH CARE EDUCATION/TRAINING PROGRAM

## 2024-12-12 PROCEDURE — 80053 COMPREHEN METABOLIC PANEL: CPT

## 2024-12-12 PROCEDURE — 82248 BILIRUBIN DIRECT: CPT

## 2024-12-12 PROCEDURE — 96375 TX/PRO/DX INJ NEW DRUG ADDON: CPT

## 2024-12-12 PROCEDURE — 82378 CARCINOEMBRYONIC ANTIGEN: CPT

## 2024-12-12 PROCEDURE — 1159F MED LIST DOCD IN RCRD: CPT | Mod: CPTII,S$GLB,, | Performed by: STUDENT IN AN ORGANIZED HEALTH CARE EDUCATION/TRAINING PROGRAM

## 2024-12-12 PROCEDURE — 96413 CHEMO IV INFUSION 1 HR: CPT

## 2024-12-12 PROCEDURE — 99999 PR PBB SHADOW E&M-EST. PATIENT-LVL IV: CPT | Mod: PBBFAC,,, | Performed by: STUDENT IN AN ORGANIZED HEALTH CARE EDUCATION/TRAINING PROGRAM

## 2024-12-12 PROCEDURE — 3079F DIAST BP 80-89 MM HG: CPT | Mod: CPTII,S$GLB,, | Performed by: STUDENT IN AN ORGANIZED HEALTH CARE EDUCATION/TRAINING PROGRAM

## 2024-12-12 PROCEDURE — 96367 TX/PROPH/DG ADDL SEQ IV INF: CPT

## 2024-12-12 RX ORDER — SODIUM CHLORIDE 0.9 % (FLUSH) 0.9 %
10 SYRINGE (ML) INJECTION
Status: CANCELLED | OUTPATIENT
Start: 2024-12-12

## 2024-12-12 RX ORDER — DIPHENHYDRAMINE HYDROCHLORIDE 50 MG/ML
50 INJECTION INTRAMUSCULAR; INTRAVENOUS ONCE AS NEEDED
Status: CANCELLED | OUTPATIENT
Start: 2024-12-12

## 2024-12-12 RX ORDER — PROCHLORPERAZINE EDISYLATE 5 MG/ML
5 INJECTION INTRAMUSCULAR; INTRAVENOUS ONCE AS NEEDED
Status: DISCONTINUED | OUTPATIENT
Start: 2024-12-12 | End: 2024-12-12 | Stop reason: HOSPADM

## 2024-12-12 RX ORDER — HEPARIN 100 UNIT/ML
500 SYRINGE INTRAVENOUS
Status: CANCELLED | OUTPATIENT
Start: 2024-12-12

## 2024-12-12 RX ORDER — EPINEPHRINE 0.3 MG/.3ML
0.3 INJECTION SUBCUTANEOUS ONCE AS NEEDED
Status: CANCELLED | OUTPATIENT
Start: 2024-12-12

## 2024-12-12 RX ORDER — SODIUM CHLORIDE 0.9 % (FLUSH) 0.9 %
10 SYRINGE (ML) INJECTION
Status: DISCONTINUED | OUTPATIENT
Start: 2024-12-12 | End: 2024-12-12 | Stop reason: HOSPADM

## 2024-12-12 RX ORDER — HEPARIN 100 UNIT/ML
500 SYRINGE INTRAVENOUS
Status: DISCONTINUED | OUTPATIENT
Start: 2024-12-12 | End: 2024-12-12 | Stop reason: HOSPADM

## 2024-12-12 RX ORDER — DIPHENHYDRAMINE HYDROCHLORIDE 50 MG/ML
50 INJECTION INTRAMUSCULAR; INTRAVENOUS ONCE AS NEEDED
Status: COMPLETED | OUTPATIENT
Start: 2024-12-12 | End: 2024-12-12

## 2024-12-12 RX ORDER — FAMOTIDINE 10 MG/ML
20 INJECTION INTRAVENOUS
Status: COMPLETED | OUTPATIENT
Start: 2024-12-12 | End: 2024-12-12

## 2024-12-12 RX ORDER — EPINEPHRINE 0.3 MG/.3ML
0.3 INJECTION SUBCUTANEOUS ONCE AS NEEDED
Status: DISCONTINUED | OUTPATIENT
Start: 2024-12-12 | End: 2024-12-12 | Stop reason: HOSPADM

## 2024-12-12 RX ORDER — PROCHLORPERAZINE EDISYLATE 5 MG/ML
5 INJECTION INTRAMUSCULAR; INTRAVENOUS ONCE AS NEEDED
Status: CANCELLED
Start: 2024-12-12

## 2024-12-12 RX ADMIN — DEXTROSE MONOHYDRATE: 50 INJECTION, SOLUTION INTRAVENOUS at 10:12

## 2024-12-12 RX ADMIN — DEXAMETHASONE SODIUM PHOSPHATE 0.25 MG: 4 INJECTION, SOLUTION INTRA-ARTICULAR; INTRALESIONAL; INTRAMUSCULAR; INTRAVENOUS; SOFT TISSUE at 10:12

## 2024-12-12 RX ADMIN — FAMOTIDINE 20 MG: 10 INJECTION, SOLUTION INTRAVENOUS at 12:12

## 2024-12-12 RX ADMIN — HYDROCORTISONE SODIUM SUCCINATE 100 MG: 100 INJECTION, POWDER, FOR SOLUTION INTRAMUSCULAR; INTRAVENOUS at 11:12

## 2024-12-12 RX ADMIN — DIPHENHYDRAMINE HYDROCHLORIDE 25 MG: 50 INJECTION INTRAMUSCULAR; INTRAVENOUS at 11:12

## 2024-12-12 RX ADMIN — POTASSIUM CHLORIDE AND SODIUM CHLORIDE 250 ML/HR: 900; 300 INJECTION, SOLUTION INTRAVENOUS at 11:12

## 2024-12-12 RX ADMIN — OXALIPLATIN 200 MG: 5 INJECTION, SOLUTION INTRAVENOUS at 10:12

## 2024-12-12 RX ADMIN — HEPARIN 500 UNITS: 100 SYRINGE at 03:12

## 2024-12-12 NOTE — NURSING
"Called into room.pt c/o flush face hands itching. Stopped oxaliplatin NP called into room. 191ml given from 605ml bag of oxaliplatin V/s taken 106/66, 75, 94%, 97.9. Rescue meds given via MAR. Dr Lejeune notified via teams by maged mendez. Np states "ok" to start 40meq k+ after rescue meds.  "

## 2024-12-12 NOTE — NURSING
CRISTOBAL Tan spoke with Dr. Lejeune, wants to re-challenge Oxaliplatin at 3hr rate. 413.9 ml left in bag, will start at 137.9 ml/hr.

## 2024-12-27 LAB
ALBUMIN SERPL-MCNC: 3.4 G/DL (ref 3.4–5)
ALBUMIN/GLOB SERPL: 0.7 RATIO
ALP SERPL-CCNC: 599 UNIT/L (ref 50–144)
ALT SERPL-CCNC: 16 UNIT/L (ref 1–45)
ANION GAP SERPL CALC-SCNC: 3 MEQ/L (ref 2–13)
ANISOCYTOSIS BLD QL SMEAR: ABNORMAL
AST SERPL-CCNC: 54 UNIT/L (ref 14–36)
BASOPHILS # BLD AUTO: 0.03 X10(3)/MCL (ref 0.01–0.08)
BASOPHILS NFR BLD AUTO: 0.4 % (ref 0.1–1.2)
BILIRUB SERPL-MCNC: 1.7 MG/DL (ref 0–1)
BNP BLD-MCNC: 480 PG/ML (ref 0–124.9)
BUN SERPL-MCNC: 12 MG/DL (ref 7–20)
CALCIUM SERPL-MCNC: 8.9 MG/DL (ref 8.4–10.2)
CHLORIDE SERPL-SCNC: 100 MMOL/L (ref 98–110)
CO2 SERPL-SCNC: 34 MMOL/L (ref 21–32)
CREAT SERPL-MCNC: 0.9 MG/DL (ref 0.66–1.25)
CREAT/UREA NIT SERPL: 13 (ref 12–20)
EOSINOPHIL # BLD AUTO: 0 X10(3)/MCL (ref 0.04–0.36)
EOSINOPHIL NFR BLD AUTO: 0 % (ref 0.7–7)
ERYTHROCYTE [DISTWIDTH] IN BLOOD BY AUTOMATED COUNT: 25.8 % (ref 11–14.5)
GFR SERPLBLD CREATININE-BSD FMLA CKD-EPI: 76 ML/MIN/1.73/M2
GLOBULIN SER-MCNC: 5 GM/DL (ref 2–3.9)
GLUCOSE SERPL-MCNC: 135 MG/DL (ref 70–115)
HCT VFR BLD AUTO: 29 % (ref 36–48)
HGB BLD-MCNC: 9.3 G/DL (ref 11.8–16)
IMM GRANULOCYTES # BLD AUTO: 0.03 X10(3)/MCL (ref 0–0.03)
IMM GRANULOCYTES NFR BLD AUTO: 0.4 % (ref 0–0.5)
LYMPHOCYTES # BLD AUTO: 1.39 X10(3)/MCL (ref 1.16–3.74)
LYMPHOCYTES NFR BLD AUTO: 20.7 % (ref 20–55)
MACROCYTES BLD QL SMEAR: ABNORMAL
MCH RBC QN AUTO: 27.8 PG (ref 27–34)
MCHC RBC AUTO-ENTMCNC: 32.1 G/DL (ref 31–37)
MCV RBC AUTO: 86.8 FL (ref 79–99)
MICROCYTES BLD QL SMEAR: ABNORMAL
MONOCYTES # BLD AUTO: 0.57 X10(3)/MCL (ref 0.24–0.36)
MONOCYTES NFR BLD AUTO: 8.5 % (ref 4.7–12.5)
NEUTROPHILS # BLD AUTO: 4.68 X10(3)/MCL (ref 1.56–6.13)
NEUTROPHILS NFR BLD AUTO: 70 % (ref 37–73)
NRBC BLD AUTO-RTO: 0 %
PLATELET # BLD AUTO: 178 X10(3)/MCL (ref 140–371)
PLATELET # BLD EST: NORMAL 10*3/UL
PMV BLD AUTO: 8.7 FL (ref 9.4–12.4)
POIKILOCYTOSIS BLD QL SMEAR: ABNORMAL
POTASSIUM SERPL-SCNC: 2.6 MMOL/L (ref 3.5–5.1)
PROT SERPL-MCNC: 8.4 GM/DL (ref 6.3–8.2)
RBC # BLD AUTO: 3.34 X10(6)/MCL (ref 4–5.1)
RBC MORPH BLD: ABNORMAL
SODIUM SERPL-SCNC: 137 MMOL/L (ref 136–145)
TARGETS BLD QL SMEAR: ABNORMAL
TROPONIN I SERPL-MCNC: <0.012 NG/ML (ref 0–0.03)
WBC # BLD AUTO: 6.7 X10(3)/MCL (ref 4–11.5)

## 2024-12-27 PROCEDURE — 84484 ASSAY OF TROPONIN QUANT: CPT

## 2024-12-27 PROCEDURE — 85025 COMPLETE CBC W/AUTO DIFF WBC: CPT

## 2024-12-27 PROCEDURE — 99285 EMERGENCY DEPT VISIT HI MDM: CPT | Mod: 25

## 2024-12-27 PROCEDURE — 80053 COMPREHEN METABOLIC PANEL: CPT

## 2024-12-27 PROCEDURE — 83880 ASSAY OF NATRIURETIC PEPTIDE: CPT

## 2024-12-27 RX ORDER — POTASSIUM CHLORIDE 20 MEQ/1
40 TABLET, EXTENDED RELEASE ORAL
Status: COMPLETED | OUTPATIENT
Start: 2024-12-28 | End: 2024-12-28

## 2024-12-28 ENCOUNTER — HOSPITAL ENCOUNTER (EMERGENCY)
Facility: HOSPITAL | Age: 54
Discharge: HOME | End: 2024-12-28
Payer: MEDICARE

## 2024-12-28 VITALS
DIASTOLIC BLOOD PRESSURE: 99 MMHG | SYSTOLIC BLOOD PRESSURE: 158 MMHG | RESPIRATION RATE: 18 BRPM | HEART RATE: 97 BPM | BODY MASS INDEX: 19.9 KG/M2 | TEMPERATURE: 98 F | WEIGHT: 139 LBS | HEIGHT: 70 IN | OXYGEN SATURATION: 97 %

## 2024-12-28 DIAGNOSIS — R60.9 EDEMA: ICD-10-CM

## 2024-12-28 DIAGNOSIS — E87.6 HYPOKALEMIA: Primary | ICD-10-CM

## 2024-12-28 LAB
AMPHET UR QL SCN: NEGATIVE
BACTERIA #/AREA URNS AUTO: NORMAL /HPF
BARBITURATE SCN PRESENT UR: NEGATIVE
BENZODIAZ UR QL SCN: NEGATIVE
BILIRUB UR QL STRIP.AUTO: NEGATIVE
CANNABINOIDS UR QL SCN: NEGATIVE
CLARITY UR: CLEAR
COCAINE UR QL SCN: NEGATIVE
COLOR UR AUTO: YELLOW
GLUCOSE UR QL STRIP: NEGATIVE
HGB UR QL STRIP: ABNORMAL
KETONES UR QL STRIP: NEGATIVE
LEUKOCYTE ESTERASE UR QL STRIP: NEGATIVE
METHADONE UR QL SCN: NEGATIVE
NITRITE UR QL STRIP: NEGATIVE
OPIATES UR QL SCN: POSITIVE
PCP UR QL: NEGATIVE
PH UR STRIP: 7 [PH]
PH UR: 7 [PH] (ref 5–8)
PROT UR QL STRIP: NEGATIVE
RBC #/AREA URNS AUTO: NORMAL /HPF
SP GR UR STRIP.AUTO: 1.01 (ref 1–1.03)
SQUAMOUS #/AREA URNS AUTO: NORMAL /HPF
UROBILINOGEN UR STRIP-ACNC: >=8
WBC #/AREA URNS AUTO: NORMAL /HPF

## 2024-12-28 PROCEDURE — 81015 MICROSCOPIC EXAM OF URINE: CPT

## 2024-12-28 PROCEDURE — 25000003 PHARM REV CODE 250

## 2024-12-28 PROCEDURE — 81003 URINALYSIS AUTO W/O SCOPE: CPT

## 2024-12-28 PROCEDURE — 96374 THER/PROPH/DIAG INJ IV PUSH: CPT

## 2024-12-28 PROCEDURE — 93005 ELECTROCARDIOGRAM TRACING: CPT

## 2024-12-28 PROCEDURE — 96375 TX/PRO/DX INJ NEW DRUG ADDON: CPT

## 2024-12-28 PROCEDURE — 93010 ELECTROCARDIOGRAM REPORT: CPT | Mod: ,,, | Performed by: INTERNAL MEDICINE

## 2024-12-28 PROCEDURE — 80307 DRUG TEST PRSMV CHEM ANLYZR: CPT

## 2024-12-28 PROCEDURE — 63600175 PHARM REV CODE 636 W HCPCS

## 2024-12-28 RX ORDER — HYDROMORPHONE HYDROCHLORIDE 2 MG/ML
2 INJECTION, SOLUTION INTRAMUSCULAR; INTRAVENOUS; SUBCUTANEOUS
Status: COMPLETED | OUTPATIENT
Start: 2024-12-28 | End: 2024-12-28

## 2024-12-28 RX ORDER — LORAZEPAM 2 MG/ML
1 INJECTION INTRAMUSCULAR
Status: COMPLETED | OUTPATIENT
Start: 2024-12-28 | End: 2024-12-28

## 2024-12-28 RX ORDER — LORAZEPAM 2 MG/ML
2 INJECTION INTRAMUSCULAR
Status: DISCONTINUED | OUTPATIENT
Start: 2024-12-28 | End: 2024-12-28

## 2024-12-28 RX ORDER — HYDROMORPHONE HYDROCHLORIDE 2 MG/ML
2 INJECTION, SOLUTION INTRAMUSCULAR; INTRAVENOUS; SUBCUTANEOUS
Status: DISCONTINUED | OUTPATIENT
Start: 2024-12-28 | End: 2024-12-28

## 2024-12-28 RX ORDER — POTASSIUM CHLORIDE 20 MEQ/1
20 TABLET, EXTENDED RELEASE ORAL 3 TIMES DAILY
Qty: 90 TABLET | Refills: 0 | Status: SHIPPED | OUTPATIENT
Start: 2024-12-28 | End: 2024-12-30

## 2024-12-28 RX ADMIN — HYDROMORPHONE HYDROCHLORIDE 2 MG: 2 INJECTION, SOLUTION INTRAMUSCULAR; INTRAVENOUS; SUBCUTANEOUS at 01:12

## 2024-12-28 RX ADMIN — POTASSIUM CHLORIDE 40 MEQ: 1500 TABLET, EXTENDED RELEASE ORAL at 12:12

## 2024-12-28 RX ADMIN — LORAZEPAM 1 MG: 2 INJECTION, SOLUTION INTRAMUSCULAR; INTRAVENOUS at 01:12

## 2024-12-28 NOTE — ED PROVIDER NOTES
Encounter Date: 12/27/2024       History     Chief Complaint   Patient presents with    Leg Swelling     Hx of liver and colon cancer. Treatments every 3-4 weeks. Next tx Monday. Swelling started about a week ago. Left foot 3+ Right foot 2+    Abdominal Pain     Abdominal and back pain that comes and goes     54-year-old female, well known to me, presents complaining of lower extremity edema.  She is having swelling in both her ankles and feet, more so on the left than the right.  She also sometimes has low back pain and abdominal pain.  She has been treated for metastatic CA.    The history is provided by the patient.     Review of patient's allergies indicates:   Allergen Reactions    Ketorolac Hives    Tramadol Hives    Vancomycin analogues Hives     Past Medical History:   Diagnosis Date    Brain tumor     Cervical ca     Depression     Malignant neoplasm of colon, unspecified     Opioid abuse      Past Surgical History:   Procedure Laterality Date    BRAIN SURGERY      CHOLECYSTECTOMY      COLONOSCOPY N/A 06/06/2024    Procedure: COLON;  Surgeon: Kevin Causey MD;  Location: [facility] ENDOSCOPY;  Service: Gastroenterology;  Laterality: N/A;    COLONOSCOPY, WITH 1 OR MORE BIOPSIES  06/06/2024    Procedure: COLONOSCOPY, WITH 1 OR MORE BIOPSIES;  Surgeon: Kevin Causey MD;  Location: [facility] ENDOSCOPY;  Service: Gastroenterology;;    COLONOSCOPY, WITH DIRECTED SUBMUCOSAL INJECTION  06/06/2024    Procedure: COLONOSCOPY, WITH DIRECTED SUBMUCOSAL INJECTION;  Surgeon: Kevin Causey MD;  Location: [facility] ENDOSCOPY;  Service: Gastroenterology;;    DIAGNOSTIC LAPAROSCOPY N/A 7/18/2024    Procedure: LAPAROSCOPY, DIAGNOSTIC;  Surgeon: Rubio Cast MD;  Location: [facility];  Service: General;  Laterality: N/A;  WITH LAP LIVER BIOPSY - ULTRASOUND GUIDED    EGD, WITH CLOSED BIOPSY  06/06/2024    Procedure: EGD, WITH CLOSED BIOPSY;  Surgeon: Kevin Causey MD;  Location: [facility]  ENDOSCOPY;  Service: Gastroenterology;;    ESOPHAGOGASTRODUODENOSCOPY N/A 06/06/2024    Procedure: DOUBLE;  Surgeon: Kevin Causey MD;  Location: [facility] ENDOSCOPY;  Service: Gastroenterology;  Laterality: N/A;    HYSTERECTOMY      INSERTION OF TUNNELED CENTRAL VENOUS CATHETER (CVC) WITH SUBCUTANEOUS PORT N/A 7/18/2024    Procedure: [id number];  Surgeon: Rubio Cast MD;  Location: [facility] OR;  Service: General;  Laterality: N/A;    KNEE SURGERY      THYROID LOBECTOMY       Family History   Problem Relation Name Age of Onset    Hypertension Mother      Hypertension Father      Stroke Father      Pancreatic cancer Brother      Cancer Paternal Grandfather       Social History     Tobacco Use    Smoking status: Never     Passive exposure: Never    Smokeless tobacco: Never   Substance Use Topics    Alcohol use: Never    Drug use: Not Currently     Types: Cocaine     Comment: 5-6 YEARS QUIT ON SUBOXONE     Review of Systems   Constitutional:  Negative for fever.   HENT:  Negative for sore throat.    Respiratory:  Negative for shortness of breath.    Cardiovascular:  Positive for leg swelling. Negative for chest pain.   Gastrointestinal:  Positive for abdominal pain. Negative for nausea.   Genitourinary:  Negative for dysuria.   Musculoskeletal:  Positive for back pain.   Skin:  Negative for rash.   Neurological:  Negative for weakness.   Hematological:  Does not bruise/bleed easily.   All other systems reviewed and are negative.      Physical Exam     Initial Vitals [12/27/24 2147]   BP Pulse Resp Temp SpO2   (!) 140/89 96 16 98.1 °F (36.7 °C) 98 %      MAP       --         Physical Exam    Nursing note and vitals reviewed.  Constitutional: Vital signs are normal. She appears well-developed and well-nourished. She is cooperative.   HENT:   Head: Normocephalic and atraumatic. Mouth/Throat: Oropharynx is clear and moist.   Eyes: Conjunctivae, EOM and lids are normal. Pupils are equal, round, and  reactive to light.   Neck: Trachea normal. Neck supple.   Normal range of motion.  Cardiovascular:  Normal rate, regular rhythm, normal heart sounds and intact distal pulses.           Pulmonary/Chest: Breath sounds normal.   Abdominal: Abdomen is soft. Bowel sounds are normal.   Musculoskeletal:         General: Edema present. Normal range of motion.      Cervical back: Normal, normal range of motion and neck supple.      Lumbar back: Normal.     Neurological: She is alert and oriented to person, place, and time. She has normal strength. Coordination normal. GCS score is 15. GCS eye subscore is 4. GCS verbal subscore is 5. GCS motor subscore is 6.   Skin: Skin is warm, dry and intact. Capillary refill takes less than 2 seconds.   Psychiatric: She has a normal mood and affect. Her speech is normal and behavior is normal. Judgment and thought content normal. Cognition and memory are normal.         ED Course   Procedures  Labs Reviewed   URINALYSIS, REFLEX TO URINE CULTURE - Abnormal       Result Value    Color, UA Yellow      Appearance, UA Clear      Specific Gravity, UA 1.010      pH, UA 7.0      Protein, UA Negative      Glucose, UA Negative      Ketones, UA Negative      Blood, UA Trace-Intact (*)     Bilirubin, UA Negative      Urobilinogen, UA >=8.0 (*)     Nitrites, UA Negative      Leukocyte Esterase, UA Negative      Narrative:      URINE STABILITY IS 2 HOURS AT ROOM TEMP OR    SIX HOURS REFRIGERATED. PERFORMING TESTING ON    SPECIMENS GREATER THAN THIS AGE MAY AFFECT THE    FOLLOWING TESTS:    PH          SPECIFIC GRAVITY           BLOOD    CLARITY     BILIRUBIN               UROBILINOGEN   COMPREHENSIVE METABOLIC PANEL - Abnormal    Sodium 137      Potassium 2.6 (*)     Chloride 100      CO2 34 (*)     Glucose 135 (*)     Blood Urea Nitrogen 12      Creatinine 0.90      Calcium 8.9      Protein Total 8.4 (*)     Albumin 3.4      Globulin 5.0 (*)     Albumin/Globulin Ratio 0.7      Bilirubin Total 1.7 (*)       (*)     ALT 16      AST 54 (*)     eGFR 76      Anion Gap 3.0      BUN/Creatinine Ratio 13     NT-PRO NATRIURETIC PEPTIDE - Abnormal    ProBNP 480.0 (*)    CBC WITH DIFFERENTIAL - Abnormal    WBC 6.70      RBC 3.34 (*)     Hgb 9.3 (*)     Hct 29.0 (*)     MCV 86.8      MCH 27.8      MCHC 32.1      RDW 25.8 (*)     Platelet 178      MPV 8.7 (*)     Neut % 70.0      Lymph % 20.7      Mono % 8.5      Eos % 0.0 (*)     Basophil % 0.4      Lymph # 1.39      Neut # 4.68      Mono # 0.57 (*)     Eos # 0.00 (*)     Baso # 0.03      IG# 0.03      IG% 0.4      NRBC% 0.0     BLOOD SMEAR MICROSCOPIC EXAM (OLG) - Abnormal    RBC Morph Abnormal (*)     Anisocytosis 2+ (*)     Macrocytosis 1+ (*)     Microcytosis 1+ (*)     Poikilocytosis 1+ (*)     Target Cells 1+ (*)     Platelets Normal     TROPONIN I - Normal    Troponin-I <0.012     CBC W/ AUTO DIFFERENTIAL    Narrative:     The following orders were created for panel order CBC auto differential.  Procedure                               Abnormality         Status                     ---------                               -----------         ------                     CBC with Differential[[id number]]       Abnormal            Final result                 Please view results for these tests on the individual orders.   DRUG SCREEN, URINE (BEAKER)   URINALYSIS, MICROSCOPIC        ECG Results              EKG 12-lead (Preliminary result)  Result time 12/28/24 00:43:14      Wet Read by Guido Nelson MD (12/28/24 00:43:14, [facility])    Normal sinus rhythm, heart rate 97, normal intervals, normal axis, normal P waves, normal QRS, normal ST segments, normal T-waves, normal QT.                                  Imaging Results              CT Abdomen Pelvis  Without Contrast (Final result)  Result time 12/27/24 21:59:39      Final result by Joe Osuna MD (12/27/24 21:59:39)                   Impression:        1.  There are findings involving the visible lung base and the liver suspicious for metastatic disease.    2.  Circumferential wall thickening of a segment of what is believed to be colon in the region of the transverse/descending colon.    3.  A previously seen 8 mm radiopaque nonobstructing calculus involving the lower pole calyx of the left kidney.    4.  Apparent thickening of the urinary bladder wall, although the bladder is under distended, I suspect the possibility of of chronic cystitis.    n/a    CATEGORY: n/a    The following dose reduction techniques are used for all CT at [facility]:    1.   Automated exposure control.    2.   Adjustment of the mA and/or kV according to patient size.    3.   Use of iterative reconstruction technique.      Electronically signed by: Joe Osuna  Date:    12/27/2024  Time:    21:59               Narrative:    EXAMINATION:  CT ABDOMEN PELVIS WITHOUT CONTRAST    CLINICAL HISTORY:  Abdominal pain, acute, nonlocalized;    TECHNIQUE:  Low dose axial images, sagittal and coronal reformations were obtained from the lung bases to the pubic symphysis.  Oral contrast was not administered.    COMPARISON:  09/28/2024    FINDINGS:  Several nodules are noted involving visible lung base suspicious for metastatic disease.    Liver:  Multiple low-density lesions are noted throughout the hepatic parenchyma suspicious for metastatic disease.  I suspect a small amount of fluid around the liver.    Gallbladder/Biliary System:  Compatible with a previous cholecystectomy.  A previously seen radiopaque density is again noted in the gallbladder fossa having a similar appearance to the prior study.    Spleen:  No clinically significant abnormalities noted.  I suspect a small amount of fluid around the spleen.    Adrenal glands:  No clinically significant abnormalities noted.    Pancreas:  The pancreas is atrophic.    Kidneys/Urinary Tract:  A previously seen radiopaque  nonobstructing 8 mm calculus is present in the lower pole calyx of the left kidney.    Urinary bladder:  The urinary bladder contains a small amount of urine and yet there is apparent thickening of the wall of the urinary bladder suggesting the possibility of chronic cystitis.    Prostate gland/uterus and ovaries:  Compatible with a previous hysterectomy.    GI tract:  There is apparent circumferential wall thickening of a segment of the bowel in the left upper quadrant which is believed to represent the splenic flexure/proximal descending colon.  Small colonic diverticuli are also noted in the region.    Vascular structures:  Atherosclerotic calcification is present involving the aorta and its major branches.    Musculoskeletal structures:  Moderate bony demineralization is present with mild to moderate DJD.    Miscellaneous: A catheter is present in the abdomen and pelvis which is believed to represent a ventriculoperitoneal shunt.                                       X-Ray Chest AP Portable (Preliminary result)  Result time 12/27/24 21:34:38      Wet Read by Guido Nelson MD (12/27/24 21:34:38, [facility])    Increased lung markings, no discrete infiltrates                                     Medications   HYDROmorphone (PF) injection 2 mg (has no administration in time range)   LORazepam injection 1 mg (has no administration in time range)   potassium chloride SA CR tablet 40 mEq (40 mEq Oral Given 12/28/24 0020)     Medical Decision Making  Lower extremity edema, metastatic CA, abdominal pain  Differential diagnosis:  Congestive heart failure, anemia, renal failure, hepatic failure, medication side effects, intestinal blockage  Analgesia, potassium  CT, labs, chest x-ray    Amount and/or Complexity of Data Reviewed  Labs: ordered. Decision-making details documented in ED Course.  Radiology: ordered and independent interpretation performed.  Discussion of  management or test interpretation with external provider(s): Labs look okay, except for her chronic hypokalemia.  There was no evidence of infection.  There was no evidence of congestive heart failure.  She is not significantly anemic.  We are awaiting results of the urinalysis.    Risk  Prescription drug management.               ED Course as of 12/28/24 0059   Fri Dec 27, 2024   2304 CBC auto differential(!)  Bit more anemic than 2 weeks ago. [TM]      ED Course User Index  [TM] Guido Nelson MD                           Clinical Impression:  Final diagnoses:  [R60.9] Edema  [E87.6] Hypokalemia (Primary)          ED Disposition Condition    Discharge Good          ED Prescriptions       Medication Sig Dispense Start Date End Date Auth. Provider    potassium chloride SA (K-DUR,KLOR-CON) 20 MEQ tablet Take 1 tablet (20 mEq total) by mouth 3 (three) times daily. 90 tablet 12/28/2024 1/27/2025 Guido Nelson MD          Follow-up Information    None          Guido Nelson MD  12/28/24 0059

## 2024-12-30 ENCOUNTER — OFFICE VISIT (OUTPATIENT)
Dept: HEMATOLOGY/ONCOLOGY | Facility: CLINIC | Age: 54
End: 2024-12-30
Payer: MEDICARE

## 2024-12-30 ENCOUNTER — LAB VISIT (OUTPATIENT)
Dept: LAB | Facility: HOSPITAL | Age: 54
End: 2024-12-30
Attending: STUDENT IN AN ORGANIZED HEALTH CARE EDUCATION/TRAINING PROGRAM
Payer: MEDICARE

## 2024-12-30 ENCOUNTER — INFUSION (OUTPATIENT)
Dept: INFUSION THERAPY | Facility: HOSPITAL | Age: 54
End: 2024-12-30
Payer: MEDICARE

## 2024-12-30 VITALS
BODY MASS INDEX: 20.53 KG/M2 | TEMPERATURE: 98 F | DIASTOLIC BLOOD PRESSURE: 82 MMHG | SYSTOLIC BLOOD PRESSURE: 138 MMHG | OXYGEN SATURATION: 99 % | HEIGHT: 70 IN | RESPIRATION RATE: 20 BRPM | HEART RATE: 82 BPM | WEIGHT: 143.38 LBS

## 2024-12-30 VITALS
HEART RATE: 99 BPM | DIASTOLIC BLOOD PRESSURE: 72 MMHG | SYSTOLIC BLOOD PRESSURE: 136 MMHG | RESPIRATION RATE: 16 BRPM | BODY MASS INDEX: 20.53 KG/M2 | WEIGHT: 143.38 LBS | OXYGEN SATURATION: 99 % | HEIGHT: 70 IN | TEMPERATURE: 98 F

## 2024-12-30 DIAGNOSIS — T40.2X5A OPIOID-INDUCED CONSTIPATION: ICD-10-CM

## 2024-12-30 DIAGNOSIS — M25.474 BILATERAL SWELLING OF FEET AND ANKLES: ICD-10-CM

## 2024-12-30 DIAGNOSIS — C18.9 COLON ADENOCARCINOMA: Primary | ICD-10-CM

## 2024-12-30 DIAGNOSIS — L70.8 ACNEIFORM RASH: ICD-10-CM

## 2024-12-30 DIAGNOSIS — R10.30 LOWER ABDOMINAL PAIN: ICD-10-CM

## 2024-12-30 DIAGNOSIS — D84.821 IMMUNODEFICIENCY DUE TO DRUG THERAPY: ICD-10-CM

## 2024-12-30 DIAGNOSIS — M25.475 BILATERAL SWELLING OF FEET AND ANKLES: ICD-10-CM

## 2024-12-30 DIAGNOSIS — M25.472 BILATERAL SWELLING OF FEET AND ANKLES: ICD-10-CM

## 2024-12-30 DIAGNOSIS — C18.9 COLON ADENOCARCINOMA: ICD-10-CM

## 2024-12-30 DIAGNOSIS — Z79.899 IMMUNODEFICIENCY DUE TO DRUG THERAPY: ICD-10-CM

## 2024-12-30 DIAGNOSIS — C78.7 METASTASIS TO LIVER: ICD-10-CM

## 2024-12-30 DIAGNOSIS — M25.471 BILATERAL SWELLING OF FEET AND ANKLES: ICD-10-CM

## 2024-12-30 DIAGNOSIS — R22.42 LOCALIZED SWELLING, MASS AND LUMP, LEFT LOWER LIMB: ICD-10-CM

## 2024-12-30 DIAGNOSIS — M79.89 LOCALIZED SWELLING OF BOTH LOWER EXTREMITIES: ICD-10-CM

## 2024-12-30 DIAGNOSIS — K59.03 OPIOID-INDUCED CONSTIPATION: ICD-10-CM

## 2024-12-30 LAB
ALBUMIN SERPL-MCNC: 2.4 G/DL (ref 3.5–5)
ALBUMIN/GLOB SERPL: 0.4 RATIO (ref 1.1–2)
ALP SERPL-CCNC: 577 UNIT/L (ref 40–150)
ALT SERPL-CCNC: 14 UNIT/L (ref 0–55)
ANION GAP SERPL CALC-SCNC: 7 MEQ/L
AST SERPL-CCNC: 45 UNIT/L (ref 5–34)
BASOPHILS # BLD AUTO: 0.01 X10(3)/MCL
BASOPHILS NFR BLD AUTO: 0.2 %
BILIRUB DIRECT SERPL-MCNC: 0.7 MG/DL (ref 0–?)
BILIRUB SERPL-MCNC: 1.4 MG/DL
BUN SERPL-MCNC: 11 MG/DL (ref 9.8–20.1)
CALCIUM SERPL-MCNC: 8.9 MG/DL (ref 8.4–10.2)
CEA SERPL-MCNC: 739.23 NG/ML (ref 0–3)
CHLORIDE SERPL-SCNC: 104 MMOL/L (ref 98–107)
CO2 SERPL-SCNC: 30 MMOL/L (ref 22–29)
CREAT SERPL-MCNC: 0.79 MG/DL (ref 0.55–1.02)
CREAT/UREA NIT SERPL: 14
EOSINOPHIL # BLD AUTO: 0.01 X10(3)/MCL (ref 0–0.9)
EOSINOPHIL NFR BLD AUTO: 0.2 %
ERYTHROCYTE [DISTWIDTH] IN BLOOD BY AUTOMATED COUNT: 26.8 % (ref 11.5–17)
GFR SERPLBLD CREATININE-BSD FMLA CKD-EPI: >60 ML/MIN/1.73/M2
GLOBULIN SER-MCNC: 5.7 GM/DL (ref 2.4–3.5)
GLUCOSE SERPL-MCNC: 125 MG/DL (ref 74–100)
HCT VFR BLD AUTO: 31.6 % (ref 37–47)
HGB BLD-MCNC: 9.9 G/DL (ref 12–16)
IMM GRANULOCYTES # BLD AUTO: 0.03 X10(3)/MCL (ref 0–0.04)
IMM GRANULOCYTES NFR BLD AUTO: 0.5 %
LYMPHOCYTES # BLD AUTO: 1.25 X10(3)/MCL (ref 0.6–4.6)
LYMPHOCYTES NFR BLD AUTO: 22 %
MCH RBC QN AUTO: 28.1 PG (ref 27–31)
MCHC RBC AUTO-ENTMCNC: 31.3 G/DL (ref 33–36)
MCV RBC AUTO: 89.8 FL (ref 80–94)
MONOCYTES # BLD AUTO: 0.56 X10(3)/MCL (ref 0.1–1.3)
MONOCYTES NFR BLD AUTO: 9.9 %
NEUTROPHILS # BLD AUTO: 3.82 X10(3)/MCL (ref 2.1–9.2)
NEUTROPHILS NFR BLD AUTO: 67.2 %
OHS QRS DURATION: 80 MS
OHS QTC CALCULATION: 431 MS
PLATELET # BLD AUTO: 133 X10(3)/MCL (ref 130–400)
PMV BLD AUTO: 8.9 FL (ref 7.4–10.4)
POTASSIUM SERPL-SCNC: 3 MMOL/L (ref 3.5–5.1)
PROT SERPL-MCNC: 8.1 GM/DL (ref 6.4–8.3)
RBC # BLD AUTO: 3.52 X10(6)/MCL (ref 4.2–5.4)
SODIUM SERPL-SCNC: 141 MMOL/L (ref 136–145)
WBC # BLD AUTO: 5.68 X10(3)/MCL (ref 4.5–11.5)

## 2024-12-30 PROCEDURE — 96376 TX/PRO/DX INJ SAME DRUG ADON: CPT

## 2024-12-30 PROCEDURE — 85025 COMPLETE CBC W/AUTO DIFF WBC: CPT

## 2024-12-30 PROCEDURE — 80053 COMPREHEN METABOLIC PANEL: CPT

## 2024-12-30 PROCEDURE — 63600175 PHARM REV CODE 636 W HCPCS

## 2024-12-30 PROCEDURE — 96413 CHEMO IV INFUSION 1 HR: CPT

## 2024-12-30 PROCEDURE — 25000003 PHARM REV CODE 250

## 2024-12-30 PROCEDURE — 96417 CHEMO IV INFUS EACH ADDL SEQ: CPT

## 2024-12-30 PROCEDURE — 82378 CARCINOEMBRYONIC ANTIGEN: CPT

## 2024-12-30 PROCEDURE — 96415 CHEMO IV INFUSION ADDL HR: CPT

## 2024-12-30 PROCEDURE — 82248 BILIRUBIN DIRECT: CPT

## 2024-12-30 PROCEDURE — A4216 STERILE WATER/SALINE, 10 ML: HCPCS

## 2024-12-30 PROCEDURE — 99999 PR PBB SHADOW E&M-EST. PATIENT-LVL IV: CPT | Mod: PBBFAC,,,

## 2024-12-30 PROCEDURE — 96375 TX/PRO/DX INJ NEW DRUG ADDON: CPT

## 2024-12-30 PROCEDURE — 36415 COLL VENOUS BLD VENIPUNCTURE: CPT

## 2024-12-30 RX ORDER — HEPARIN 100 UNIT/ML
500 SYRINGE INTRAVENOUS
OUTPATIENT
Start: 2025-01-16

## 2024-12-30 RX ORDER — HYDROCORTISONE 1 %
CREAM (GRAM) TOPICAL 2 TIMES DAILY
Qty: 56 G | Refills: 3 | Status: SHIPPED | OUTPATIENT
Start: 2024-12-30

## 2024-12-30 RX ORDER — SODIUM CHLORIDE 0.9 % (FLUSH) 0.9 %
10 SYRINGE (ML) INJECTION
OUTPATIENT
Start: 2025-01-09

## 2024-12-30 RX ORDER — PROCHLORPERAZINE EDISYLATE 5 MG/ML
5 INJECTION INTRAMUSCULAR; INTRAVENOUS ONCE AS NEEDED
Status: CANCELLED
Start: 2025-01-02

## 2024-12-30 RX ORDER — DIPHENHYDRAMINE HYDROCHLORIDE 50 MG/ML
50 INJECTION INTRAMUSCULAR; INTRAVENOUS ONCE AS NEEDED
Status: CANCELLED | OUTPATIENT
Start: 2025-01-02

## 2024-12-30 RX ORDER — PROCHLORPERAZINE EDISYLATE 5 MG/ML
5 INJECTION INTRAMUSCULAR; INTRAVENOUS ONCE AS NEEDED
Start: 2025-01-09

## 2024-12-30 RX ORDER — DIPHENHYDRAMINE HYDROCHLORIDE 50 MG/ML
25 INJECTION INTRAMUSCULAR; INTRAVENOUS
Status: CANCELLED
Start: 2025-01-02

## 2024-12-30 RX ORDER — DIPHENHYDRAMINE HYDROCHLORIDE 50 MG/ML
50 INJECTION INTRAMUSCULAR; INTRAVENOUS ONCE AS NEEDED
OUTPATIENT
Start: 2025-01-09

## 2024-12-30 RX ORDER — DIPHENHYDRAMINE HYDROCHLORIDE 50 MG/ML
25 INJECTION INTRAMUSCULAR; INTRAVENOUS
Start: 2025-01-16

## 2024-12-30 RX ORDER — DIPHENHYDRAMINE HYDROCHLORIDE 50 MG/ML
25 INJECTION INTRAMUSCULAR; INTRAVENOUS
Start: 2025-01-09

## 2024-12-30 RX ORDER — DIPHENHYDRAMINE HYDROCHLORIDE 50 MG/ML
50 INJECTION INTRAMUSCULAR; INTRAVENOUS ONCE AS NEEDED
OUTPATIENT
Start: 2025-01-16

## 2024-12-30 RX ORDER — EPINEPHRINE 0.3 MG/.3ML
0.3 INJECTION SUBCUTANEOUS ONCE AS NEEDED
OUTPATIENT
Start: 2025-01-16

## 2024-12-30 RX ORDER — EPINEPHRINE 0.3 MG/.3ML
0.3 INJECTION SUBCUTANEOUS ONCE AS NEEDED
Status: CANCELLED | OUTPATIENT
Start: 2025-01-02

## 2024-12-30 RX ORDER — DIPHENHYDRAMINE HYDROCHLORIDE 50 MG/ML
25 INJECTION INTRAMUSCULAR; INTRAVENOUS ONCE
Status: COMPLETED | OUTPATIENT
Start: 2024-12-30 | End: 2024-12-30

## 2024-12-30 RX ORDER — HEPARIN 100 UNIT/ML
500 SYRINGE INTRAVENOUS
Status: DISCONTINUED | OUTPATIENT
Start: 2024-12-30 | End: 2024-12-30 | Stop reason: HOSPADM

## 2024-12-30 RX ORDER — EPINEPHRINE 0.3 MG/.3ML
0.3 INJECTION SUBCUTANEOUS ONCE AS NEEDED
OUTPATIENT
Start: 2025-01-09

## 2024-12-30 RX ORDER — FAMOTIDINE 10 MG/ML
20 INJECTION INTRAVENOUS ONCE
Status: COMPLETED | OUTPATIENT
Start: 2024-12-30 | End: 2024-12-30

## 2024-12-30 RX ORDER — SODIUM CHLORIDE 0.9 % (FLUSH) 0.9 %
10 SYRINGE (ML) INJECTION
OUTPATIENT
Start: 2025-01-16

## 2024-12-30 RX ORDER — SODIUM CHLORIDE 0.9 % (FLUSH) 0.9 %
10 SYRINGE (ML) INJECTION
Status: DISCONTINUED | OUTPATIENT
Start: 2024-12-30 | End: 2024-12-30 | Stop reason: HOSPADM

## 2024-12-30 RX ORDER — DIPHENHYDRAMINE HYDROCHLORIDE 50 MG/ML
25 INJECTION INTRAMUSCULAR; INTRAVENOUS
Status: COMPLETED | OUTPATIENT
Start: 2024-12-30 | End: 2024-12-30

## 2024-12-30 RX ORDER — HEPARIN 100 UNIT/ML
500 SYRINGE INTRAVENOUS
OUTPATIENT
Start: 2025-01-09

## 2024-12-30 RX ORDER — SODIUM CHLORIDE 0.9 % (FLUSH) 0.9 %
10 SYRINGE (ML) INJECTION
Status: CANCELLED | OUTPATIENT
Start: 2025-01-02

## 2024-12-30 RX ORDER — HEPARIN 100 UNIT/ML
500 SYRINGE INTRAVENOUS
Status: CANCELLED | OUTPATIENT
Start: 2025-01-02

## 2024-12-30 RX ORDER — METHYLPREDNISOLONE SOD SUCC 125 MG
125 VIAL (EA) INJECTION ONCE
Status: COMPLETED | OUTPATIENT
Start: 2024-12-30 | End: 2024-12-30

## 2024-12-30 RX ORDER — DOXYCYCLINE 100 MG/1
100 CAPSULE ORAL 2 TIMES DAILY
Qty: 60 CAPSULE | Refills: 4 | Status: SHIPPED | OUTPATIENT
Start: 2024-12-30

## 2024-12-30 RX ORDER — FUROSEMIDE 20 MG/1
20 TABLET ORAL DAILY
Qty: 3 TABLET | Refills: 0 | Status: SHIPPED | OUTPATIENT
Start: 2024-12-30 | End: 2025-01-02

## 2024-12-30 RX ORDER — PROCHLORPERAZINE EDISYLATE 5 MG/ML
5 INJECTION INTRAMUSCULAR; INTRAVENOUS ONCE AS NEEDED
Start: 2025-01-16

## 2024-12-30 RX ADMIN — HEPARIN SODIUM (PORCINE) LOCK FLUSH IV SOLN 100 UNIT/ML 500 UNITS: 100 SOLUTION at 04:12

## 2024-12-30 RX ADMIN — DEXTROSE MONOHYDRATE: 50 INJECTION, SOLUTION INTRAVENOUS at 12:12

## 2024-12-30 RX ADMIN — Medication 10 ML: at 04:12

## 2024-12-30 RX ADMIN — FAMOTIDINE 20 MG: 10 INJECTION INTRAVENOUS at 01:12

## 2024-12-30 RX ADMIN — DIPHENHYDRAMINE HYDROCHLORIDE 25 MG: 50 INJECTION INTRAMUSCULAR; INTRAVENOUS at 10:12

## 2024-12-30 RX ADMIN — DIPHENHYDRAMINE HYDROCHLORIDE 25 MG: 50 INJECTION INTRAMUSCULAR; INTRAVENOUS at 01:12

## 2024-12-30 RX ADMIN — OXALIPLATIN 200 MG: 5 INJECTION, SOLUTION INTRAVENOUS at 12:12

## 2024-12-30 RX ADMIN — METHYLPREDNISOLONE SODIUM SUCCINATE 125 MG: 125 INJECTION, POWDER, FOR SOLUTION INTRAMUSCULAR; INTRAVENOUS at 01:12

## 2024-12-30 RX ADMIN — CETUXIMAB 700 MG: 2 SOLUTION INTRAVENOUS at 10:12

## 2024-12-30 RX ADMIN — DEXAMETHASONE SODIUM PHOSPHATE 0.25 MG: 10 INJECTION, SOLUTION INTRAMUSCULAR; INTRAVENOUS at 12:12

## 2024-12-30 RX ADMIN — POTASSIUM BICARBONATE 20 MEQ: 782 TABLET, EFFERVESCENT ORAL at 10:12

## 2024-12-30 NOTE — PROGRESS NOTES
Subjective:       Patient ID: Shira Nair is a 54 y.o. female.    Chief Complaint: Colon Cancer (F/u with labs-- Pt reports no new concerns )       Diagnosis: Metastatic Colon Adenocarcinoma - mets to liver and lung    Current Treatment:    Xelox q3w x 8 cycles started 8/12/2024  Capecitabine was decreased to 1000 mg BID for 14 days and oxaliplatin was decreased to 110 mg/m2 due to thrombocytopenia  Add cetuximab qw with C6 (12/30/2024)       Treatment History:   FOLFOX 7/29/2024 x 1 cycle   Switched to XELOX 8/12/2024 following positive drug screen.     HPI  53yo F presented in June '24 for evaluation of liver metastases. She presented to OSH in May '24 with 1 month of abdominal pain. CT A/P at that time revealed numerous hepatic lesions, largest measuring 5.8 x 7cm, concerning for metastatic disease. CT Chest revealed numerous new bilateral pulmonary nodules measuring 3-4mm in size of indeterminate etiology. IR liver biopsy was done which was completely necrotic. An egd and colonoscopy done with Dr. Jones in June '24 revealed a severely stenotic malignant lesion in the mid descending colon that was unable to be traversed. Biopsy was taken which revealed at least high grade dysplasia suspicious for adenocarcinoma. Her CEA at time of imaging/workup was 546. She has a history of cervical cancer about 15 years ago, she had a total hysterectomy for this. She has a history of a benign brain tumor that required  shunt placed in the 1980s. She also had benign thyroid tumors removed about 8 years prior to presentation.     She underwent mediport placement and liver biopsy with ex lap with Dr. Cast on 7/18/24. Liver biopsy returned positive for metastatic colorectal adenocarcinoma. Will plan to proceed with 1st line FOLFOX with 3rd agent pending NGS studies.     She did get C1 FOLFOX on 7/29/2024, reported that her dog chewed through 5FU pump cord. Due her history of drug use, a drug screen was performed  which was positive for fentanyl.She was therefore switched to XELOX and has been tolerating it well.     Interval History:  She returns to the clinic today with her mother for a follow-up and treatment clearance for C6 of XELOX q3w (total C7 chemo) + qw Cetuximab.   She was seen in ED 12/28/204 for bilateral leg swelling. Potassium also noted to be low. Now on 20meq TID. She was not previously taking potassium as prescribed.   She does note lower abdominal and back pain stable today.   She has been having chronic constipation which she takes metamucil daily for which is helping.   She continues to be followed by pain management, currently receiving MS Contin  ER 30 mg BID and MS contin 15 mg immediate release as needed for breakthrough pain.  Eating and drinking well. Weight stable. No rash, fever, chills, night sweats, infection, N/V/D.  Denies any neuropathy to her hands or feet.       Past Medical History:   Diagnosis Date    Brain tumor     Cervical ca     Depression     Malignant neoplasm of colon, unspecified     Opioid abuse       Past Surgical History:   Procedure Laterality Date    BRAIN SURGERY      CHOLECYSTECTOMY      COLONOSCOPY N/A 06/06/2024    Procedure: COLON;  Surgeon: Kevin Causey MD;  Location: [facility] ENDOSCOPY;  Service: Gastroenterology;  Laterality: N/A;    COLONOSCOPY, WITH 1 OR MORE BIOPSIES  06/06/2024    Procedure: COLONOSCOPY, WITH 1 OR MORE BIOPSIES;  Surgeon: Kevin Causey MD;  Location: [facility] ENDOSCOPY;  Service: Gastroenterology;;    COLONOSCOPY, WITH DIRECTED SUBMUCOSAL INJECTION  06/06/2024    Procedure: COLONOSCOPY, WITH DIRECTED SUBMUCOSAL INJECTION;  Surgeon: Kevin Causey MD;  Location: [facility] ENDOSCOPY;  Service: Gastroenterology;;    DIAGNOSTIC LAPAROSCOPY N/A 7/18/2024    Procedure: LAPAROSCOPY, DIAGNOSTIC;  Surgeon: Rubio Cast MD;  Location: [facility] OR;  Service: General;  Laterality: N/A;  WITH LAP LIVER BIOPSY - ULTRASOUND GUIDED     EGD, WITH CLOSED BIOPSY  06/06/2024    Procedure: EGD, WITH CLOSED BIOPSY;  Surgeon: Kevin Causey MD;  Location: [facility] ENDOSCOPY;  Service: Gastroenterology;;    ESOPHAGOGASTRODUODENOSCOPY N/A 06/06/2024    Procedure: DOUBLE;  Surgeon: Kevin Causey MD;  Location: [facility] ENDOSCOPY;  Service: Gastroenterology;  Laterality: N/A;    HYSTERECTOMY      INSERTION OF TUNNELED CENTRAL VENOUS CATHETER (CVC) WITH SUBCUTANEOUS PORT N/A 7/18/2024    Procedure: [id number];  Surgeon: Rubio Cast MD;  Location: [facility];  Service: General;  Laterality: N/A;    KNEE SURGERY      THYROID LOBECTOMY       Social History     Socioeconomic History    Marital status: Single   Tobacco Use    Smoking status: Never     Passive exposure: Never    Smokeless tobacco: Never   Substance and Sexual Activity    Alcohol use: Never    Drug use: Not Currently     Types: Cocaine     Comment: 5-6 YEARS QUIT ON SUBOXONE     Social Drivers of Health     Financial Resource Strain: Low Risk  (12/12/2024)    Overall Financial Resource Strain (CARDIA)     Difficulty of Paying Living Expenses: Not hard at all   Food Insecurity: No Food Insecurity (12/12/2024)    Hunger Vital Sign     Worried About Running Out of Food in the Last Year: Never true     Ran Out of Food in the Last Year: Never true   Transportation Needs: No Transportation Needs (12/12/2024)    TRANSPORTATION NEEDS     Transportation : No   Stress: No Stress Concern Present (12/12/2024)    [place] [employer] - Occupational Stress Questionnaire     Feeling of Stress : Not at all   Housing Stability: Low Risk  (12/12/2024)    Housing Stability Vital Sign     Unable to Pay for Housing in the Last Year: No     Homeless in the Last Year: No      Family History   Problem Relation Name Age of Onset    Hypertension Mother      Hypertension Father      Stroke Father      Pancreatic cancer Brother      Cancer Paternal Grandfather         Review of patient's allergies indicates:   Allergen Reactions    Ketorolac Hives    Tramadol Hives    Vancomycin analogues Hives      Review of Systems   Constitutional:  Negative for activity change, fatigue, fever and unexpected weight change.   HENT:  Positive for mouth dryness. Negative for sore throat.    Eyes:  Negative for visual disturbance.   Respiratory:  Negative for cough and shortness of breath.    Cardiovascular:  Negative for chest pain.   Gastrointestinal:  Positive for abdominal pain (lower abdomin). Negative for diarrhea, nausea and vomiting.   Endocrine: Negative for polyuria.   Genitourinary:  Negative for dysuria and hot flashes.   Integumentary:  Negative for rash.   Allergic/Immunologic: Negative for immunocompromised state.   Neurological:  Negative for weakness and headaches.   Hematological:  Negative for adenopathy.   Psychiatric/Behavioral:  Negative for confusion.          Objective:      There were no vitals filed for this visit.      Physical Exam  Constitutional:       General: She is not in acute distress.     Appearance: Normal appearance. She is not ill-appearing.   HENT:      Head: Normocephalic and atraumatic.      Nose: Nose normal.      Mouth/Throat:      Mouth: Mucous membranes are moist.      Pharynx: Oropharynx is clear.   Eyes:      Extraocular Movements: Extraocular movements intact.      Conjunctiva/sclera: Conjunctivae normal.      Pupils: Pupils are equal, round, and reactive to light.   Cardiovascular:      Rate and Rhythm: Normal rate and regular rhythm.      Pulses: Normal pulses.      Heart sounds: Normal heart sounds. No murmur heard.  Pulmonary:      Effort: Pulmonary effort is normal. No respiratory distress.      Breath sounds: Normal breath sounds.   Abdominal:      General: There is no distension.      Palpations: Abdomen is soft.      Tenderness: There is no abdominal tenderness.      Comments: No abdominal tenderness noted on exam with palpation    Musculoskeletal:         General: Normal range of motion.      Cervical back: Normal range of motion and neck supple.      Right lower leg: Edema present.      Left lower leg: Edema (worse than right) present.   Lymphadenopathy:      Cervical: No cervical adenopathy.   Skin:     General: Skin is warm and dry.   Neurological:      General: No focal deficit present.      Mental Status: She is alert and oriented to person, place, and time.         LABS AND IMAGING REVIEWED IN EPIC  12/9/24 CT CAP:  1. Scattered ill-defined low-attenuation lesions again evident throughout the liver which have a similar appearance to the previous exam  2. 9 mm nonobstructing calculi again evident at the lower left kidney  3. Ill-defined mucosal thickening at the right side of the colon and right transverse colon  4. Atelectasis and/or scarring at the posterior lower lungs  5. Findings and other details as above         Pathology:  6/6/24 EGD/Colonoscopy Biopsy:  1. STOMACH BIOPSY:    -MODERATE CHRONIC GASTRITIS, FOCALLY ACTIVE (SEE COMMENT).    -NO EVIDENCE OF DYSPLASIA OR MALIGNANCY.   2. COLON STRICTURE BIOPSY:    -FRAGMENTS OF COLONIC-TYPE MUCOSA WITH AT LEAST HIGH GRADE DYSPLASIA, SUSPICIOUS   FOR ADENOCARCINOMA (SEE COMMENTS).   7/18/24 Laparoscopy:  1. PERITONEUM, BIOPSY:    -MESOTHELIUM-LINED MEMBRANOUS FIBROADIPOSE TISSUE. NO NEOPLASM.   2. LEFT LOBE OF LIVER, WEDGE BIOPSY:    -METASTATIC COLORECTAL ADENOCARCINOMA.     Assessment:   Metastatic Colorectal Adenocarcinoma - mets to liver, extensive and unresectable. Currently on CAPEOX 1st line. NGS with KRAS mutation, therefore will add cetuximab to cycle 6.      Immunodeficiency due to Drug Therapy - Precautions discussed with patient. Continue to monitor for any signs of symptoms of infection. No prophylaxis needed at this time. No role for growth factor.     Acneform rash   Doxycycline 100mg BID and hydrocortisone 1% cream sent to pharmacy for prevention.   Hypokalemia   Has  not been taking it as prescribed.  20meq TID, continue at this time. Taking OTC potassium due to size of prescription pills.  20meq Effer-K given today as well.   Bilateral LE swelling  US ordered  Lasix 20mg daily sent to pharmacy X 3 days. Strict ED precautions given.       Plan:   Labs reviewed.   Toxicity reviewed, Continue C6 Capox q3w + weekly cetuximab   Continue follow up with pain management  OTC Biotene for dry mouth   RTC in one week with NP for FU/lab/treat (weekly cetuximab)  RTC in three weeks with MD for C7 Capox + weekly cetuximab  Needs to see MD for C7 and after C8.   Plan for repeat scans after C8, CT Chest and MRI Abd w/wo to further evaluate liver disease. Will order at next OV  Cbc, cmp, cea- 1 hr prior @ [facility]    I spent a total of 40 minutes on the day of the visit.This includes face to face time and non-face to face time preparing to see the patient (eg, review of tests), obtaining and/or reviewing separately obtained history, documenting clinical information in the electronic or other health record, independently interpreting results and communicating results to the patient/family/caregiver, or care coordinator.    Enma Hickman, FNP-C  Oncology/Hematology   Cancer Center [facility]     Addendum: Pt states constipation is not currently controlled. Linzess sent to pharmacy. Medication reviewed in detail with her and her sister. She verbalized understanding.

## 2024-12-31 DIAGNOSIS — M25.475 BILATERAL SWELLING OF FEET AND ANKLES: ICD-10-CM

## 2024-12-31 DIAGNOSIS — R22.42 LOCALIZED SWELLING, MASS AND LUMP, LEFT LOWER LIMB: Primary | ICD-10-CM

## 2024-12-31 DIAGNOSIS — M25.471 BILATERAL SWELLING OF FEET AND ANKLES: ICD-10-CM

## 2024-12-31 DIAGNOSIS — M25.474 BILATERAL SWELLING OF FEET AND ANKLES: ICD-10-CM

## 2024-12-31 DIAGNOSIS — M79.89 LOCALIZED SWELLING OF BOTH LOWER EXTREMITIES: ICD-10-CM

## 2024-12-31 DIAGNOSIS — M25.472 BILATERAL SWELLING OF FEET AND ANKLES: ICD-10-CM

## 2025-01-06 ENCOUNTER — HOSPITAL ENCOUNTER (OUTPATIENT)
Dept: RADIOLOGY | Facility: HOSPITAL | Age: 55
Discharge: HOME | End: 2025-01-06
Payer: MEDICARE

## 2025-01-06 DIAGNOSIS — M25.474 BILATERAL SWELLING OF FEET AND ANKLES: ICD-10-CM

## 2025-01-06 DIAGNOSIS — M79.89 LOCALIZED SWELLING OF BOTH LOWER EXTREMITIES: ICD-10-CM

## 2025-01-06 DIAGNOSIS — M25.475 BILATERAL SWELLING OF FEET AND ANKLES: ICD-10-CM

## 2025-01-06 DIAGNOSIS — M25.471 BILATERAL SWELLING OF FEET AND ANKLES: ICD-10-CM

## 2025-01-06 DIAGNOSIS — M25.472 BILATERAL SWELLING OF FEET AND ANKLES: ICD-10-CM

## 2025-01-06 DIAGNOSIS — R22.42 LOCALIZED SWELLING, MASS AND LUMP, LEFT LOWER LIMB: ICD-10-CM

## 2025-01-06 PROCEDURE — 93970 EXTREMITY STUDY: CPT | Mod: TC

## 2025-01-07 ENCOUNTER — INFUSION (OUTPATIENT)
Facility: HOSPITAL | Age: 55
End: 2025-01-07
Payer: MEDICARE

## 2025-01-07 ENCOUNTER — LAB VISIT (OUTPATIENT)
Dept: LAB | Facility: HOSPITAL | Age: 55
End: 2025-01-07
Payer: MEDICARE

## 2025-01-07 ENCOUNTER — OFFICE VISIT (OUTPATIENT)
Dept: HEMATOLOGY/ONCOLOGY | Facility: CLINIC | Age: 55
End: 2025-01-07
Payer: MEDICARE

## 2025-01-07 VITALS
HEART RATE: 84 BPM | OXYGEN SATURATION: 100 % | WEIGHT: 135.19 LBS | SYSTOLIC BLOOD PRESSURE: 109 MMHG | BODY MASS INDEX: 19.35 KG/M2 | TEMPERATURE: 98 F | RESPIRATION RATE: 18 BRPM | DIASTOLIC BLOOD PRESSURE: 74 MMHG | HEIGHT: 70 IN

## 2025-01-07 DIAGNOSIS — C18.9 COLON ADENOCARCINOMA: Primary | ICD-10-CM

## 2025-01-07 DIAGNOSIS — C18.9 COLON ADENOCARCINOMA: ICD-10-CM

## 2025-01-07 DIAGNOSIS — C78.7 METASTASIS TO LIVER: ICD-10-CM

## 2025-01-07 LAB
ALBUMIN SERPL-MCNC: 3.4 G/DL (ref 3.4–5)
ALBUMIN/GLOB SERPL: 0.7 RATIO
ALP SERPL-CCNC: 737 UNIT/L (ref 50–144)
ALT SERPL-CCNC: 19 UNIT/L (ref 1–45)
ANION GAP SERPL CALC-SCNC: 7 MEQ/L (ref 2–13)
ANISOCYTOSIS BLD QL SMEAR: ABNORMAL
AST SERPL-CCNC: 53 UNIT/L (ref 14–36)
BASOPHILS # BLD AUTO: 0.04 X10(3)/MCL (ref 0.01–0.08)
BASOPHILS NFR BLD AUTO: 0.4 % (ref 0.1–1.2)
BILIRUB SERPL-MCNC: 1.6 MG/DL (ref 0–1)
BUN SERPL-MCNC: 23 MG/DL (ref 7–20)
CALCIUM SERPL-MCNC: 9.2 MG/DL (ref 8.4–10.2)
CHLORIDE SERPL-SCNC: 100 MMOL/L (ref 98–110)
CO2 SERPL-SCNC: 32 MMOL/L (ref 21–32)
CREAT SERPL-MCNC: 0.81 MG/DL (ref 0.66–1.25)
CREAT/UREA NIT SERPL: 28 (ref 12–20)
EOSINOPHIL # BLD AUTO: 0.01 X10(3)/MCL (ref 0.04–0.36)
EOSINOPHIL NFR BLD AUTO: 0.1 % (ref 0.7–7)
ERYTHROCYTE [DISTWIDTH] IN BLOOD BY AUTOMATED COUNT: 24.7 % (ref 11–14.5)
GFR SERPLBLD CREATININE-BSD FMLA CKD-EPI: 86 ML/MIN/1.73/M2
GLOBULIN SER-MCNC: 4.6 GM/DL (ref 2–3.9)
GLUCOSE SERPL-MCNC: 107 MG/DL (ref 70–115)
HCT VFR BLD AUTO: 31.2 % (ref 36–48)
HGB BLD-MCNC: 10.3 G/DL (ref 11.8–16)
HYPOCHROMIA BLD QL SMEAR: ABNORMAL
IMM GRANULOCYTES # BLD AUTO: 0.04 X10(3)/MCL (ref 0–0.03)
IMM GRANULOCYTES NFR BLD AUTO: 0.4 % (ref 0–0.5)
LYMPHOCYTES # BLD AUTO: 1.59 X10(3)/MCL (ref 1.16–3.74)
LYMPHOCYTES NFR BLD AUTO: 17.1 % (ref 20–55)
MACROCYTES BLD QL SMEAR: ABNORMAL
MAGNESIUM SERPL-MCNC: 2 MG/DL (ref 1.8–2.4)
MCH RBC QN AUTO: 29.3 PG (ref 27–34)
MCHC RBC AUTO-ENTMCNC: 33 G/DL (ref 31–37)
MCV RBC AUTO: 88.6 FL (ref 79–99)
MICROCYTES BLD QL SMEAR: ABNORMAL
MONOCYTES # BLD AUTO: 0.66 X10(3)/MCL (ref 0.24–0.36)
MONOCYTES NFR BLD AUTO: 7.1 % (ref 4.7–12.5)
NEUTROPHILS # BLD AUTO: 6.98 X10(3)/MCL (ref 1.56–6.13)
NEUTROPHILS NFR BLD AUTO: 74.9 % (ref 37–73)
NRBC BLD AUTO-RTO: 0 %
PLATELET # BLD AUTO: 123 X10(3)/MCL (ref 140–371)
PLATELET # BLD EST: ADEQUATE 10*3/UL
PMV BLD AUTO: 9.5 FL (ref 9.4–12.4)
POIKILOCYTOSIS BLD QL SMEAR: ABNORMAL
POTASSIUM SERPL-SCNC: 2.6 MMOL/L (ref 3.5–5.1)
PROT SERPL-MCNC: 8 GM/DL (ref 6.3–8.2)
RBC # BLD AUTO: 3.52 X10(6)/MCL (ref 4–5.1)
RBC MORPH BLD: ABNORMAL
SODIUM SERPL-SCNC: 139 MMOL/L (ref 136–145)
TARGETS BLD QL SMEAR: ABNORMAL
TEAR DROP CELL (OLG): SLIGHT
WBC # BLD AUTO: 9.32 X10(3)/MCL (ref 4–11.5)

## 2025-01-07 PROCEDURE — 80053 COMPREHEN METABOLIC PANEL: CPT

## 2025-01-07 PROCEDURE — 96413 CHEMO IV INFUSION 1 HR: CPT

## 2025-01-07 PROCEDURE — 36415 COLL VENOUS BLD VENIPUNCTURE: CPT

## 2025-01-07 PROCEDURE — 96368 THER/DIAG CONCURRENT INF: CPT

## 2025-01-07 PROCEDURE — 85025 COMPLETE CBC W/AUTO DIFF WBC: CPT

## 2025-01-07 PROCEDURE — 63600175 PHARM REV CODE 636 W HCPCS

## 2025-01-07 PROCEDURE — 96415 CHEMO IV INFUSION ADDL HR: CPT

## 2025-01-07 PROCEDURE — 96375 TX/PRO/DX INJ NEW DRUG ADDON: CPT

## 2025-01-07 PROCEDURE — 83735 ASSAY OF MAGNESIUM: CPT

## 2025-01-07 RX ORDER — DIPHENHYDRAMINE HYDROCHLORIDE 50 MG/ML
50 INJECTION INTRAMUSCULAR; INTRAVENOUS ONCE AS NEEDED
Status: DISCONTINUED | OUTPATIENT
Start: 2025-01-07 | End: 2025-01-07 | Stop reason: HOSPADM

## 2025-01-07 RX ORDER — PROCHLORPERAZINE EDISYLATE 5 MG/ML
5 INJECTION INTRAMUSCULAR; INTRAVENOUS ONCE AS NEEDED
Status: DISCONTINUED | OUTPATIENT
Start: 2025-01-07 | End: 2025-01-07 | Stop reason: HOSPADM

## 2025-01-07 RX ORDER — DIPHENHYDRAMINE HYDROCHLORIDE 50 MG/ML
25 INJECTION INTRAMUSCULAR; INTRAVENOUS
Status: COMPLETED | OUTPATIENT
Start: 2025-01-07 | End: 2025-01-07

## 2025-01-07 RX ORDER — SODIUM CHLORIDE 0.9 % (FLUSH) 0.9 %
10 SYRINGE (ML) INJECTION
Status: DISCONTINUED | OUTPATIENT
Start: 2025-01-07 | End: 2025-01-07 | Stop reason: HOSPADM

## 2025-01-07 RX ORDER — POTASSIUM CHLORIDE 14.9 MG/ML
20 INJECTION INTRAVENOUS ONCE
Status: COMPLETED | OUTPATIENT
Start: 2025-01-07 | End: 2025-01-07

## 2025-01-07 RX ORDER — EPINEPHRINE 0.3 MG/.3ML
0.3 INJECTION SUBCUTANEOUS ONCE AS NEEDED
Status: DISCONTINUED | OUTPATIENT
Start: 2025-01-07 | End: 2025-01-07 | Stop reason: HOSPADM

## 2025-01-07 RX ORDER — HEPARIN 100 UNIT/ML
500 SYRINGE INTRAVENOUS
Status: DISCONTINUED | OUTPATIENT
Start: 2025-01-07 | End: 2025-01-07 | Stop reason: HOSPADM

## 2025-01-07 RX ADMIN — POTASSIUM CHLORIDE 20 MEQ: 200 INJECTION, SOLUTION INTRAVENOUS at 02:01

## 2025-01-07 RX ADMIN — HEPARIN 500 UNITS: 100 SYRINGE at 04:01

## 2025-01-07 RX ADMIN — CETUXIMAB 455 MG: 2 SOLUTION INTRAVENOUS at 02:01

## 2025-01-07 RX ADMIN — DIPHENHYDRAMINE HYDROCHLORIDE 25 MG: 50 INJECTION INTRAMUSCULAR; INTRAVENOUS at 01:01

## 2025-01-07 NOTE — PROGRESS NOTES
Subjective:       Patient ID: Shira Nair is a 54 y.o. female.    Chief Complaint: Colon adenocarcinoma (No complaints. )       Diagnosis: Metastatic Colon Adenocarcinoma - mets to liver and lung    Current Treatment:    Xelox q3w x 8 cycles started 8/12/2024  Capecitabine was decreased to 1000 mg BID for 14 days and oxaliplatin was decreased to 110 mg/m2 due to thrombocytopenia  Add cetuximab qw with C6 (12/30/2024)       Treatment History:   FOLFOX 7/29/2024 x 1 cycle   Switched to XELOX 8/12/2024 following positive drug screen.     HPI  55yo F presented in June '24 for evaluation of liver metastases. She presented to OSH in May '24 with 1 month of abdominal pain. CT A/P at that time revealed numerous hepatic lesions, largest measuring 5.8 x 7cm, concerning for metastatic disease. CT Chest revealed numerous new bilateral pulmonary nodules measuring 3-4mm in size of indeterminate etiology. IR liver biopsy was done which was completely necrotic. An egd and colonoscopy done with Dr. Jones in June '24 revealed a severely stenotic malignant lesion in the mid descending colon that was unable to be traversed. Biopsy was taken which revealed at least high grade dysplasia suspicious for adenocarcinoma. Her CEA at time of imaging/workup was 546. She has a history of cervical cancer about 15 years ago, she had a total hysterectomy for this. She has a history of a benign brain tumor that required  shunt placed in the 1980s. She also had benign thyroid tumors removed about 8 years prior to presentation.     She underwent mediport placement and liver biopsy with ex lap with Dr. Cast on 7/18/24. Liver biopsy returned positive for metastatic colorectal adenocarcinoma. Will plan to proceed with 1st line FOLFOX with 3rd agent pending NGS studies.     She did get C1 FOLFOX on 7/29/2024, reported that her dog chewed through 5FU pump cord. Due her history of drug use, a drug screen was performed which was positive for  fentanyl.She was therefore switched to XELOX and has been tolerating it well.     Interval History:  She returns to the clinic today via audio only for tox check following her first administration of erbitux. She is scheduled to treat today in [place].   She tolerated C1 well, without any current side effects or complaints.   Linzess was also added for chronic constipation. She is taking daily and has not had a bowel movement in four days.   Potassium continues to be low today. She is only taking 20meq BID.   Bilateral lower leg swelling stable. US 1/6/2025 showed no DVT  She does note lower abdominal and back pain stable today.   She continues to be followed by pain management, currently receiving MS Contin  ER 30 mg BID and MS contin 15 mg immediate release as needed for breakthrough pain.  Eating and drinking well.   No rash, fever, chills, night sweats, infection, N/V/D.  Denies any neuropathy to her hands or feet.       Past Medical History:   Diagnosis Date    Brain tumor     Cervical ca     Depression     Malignant neoplasm of colon, unspecified     Opioid abuse       Past Surgical History:   Procedure Laterality Date    BRAIN SURGERY      CHOLECYSTECTOMY      COLONOSCOPY N/A 06/06/2024    Procedure: COLON;  Surgeon: Kevin Causey MD;  Location: [facility] ENDOSCOPY;  Service: Gastroenterology;  Laterality: N/A;    COLONOSCOPY, WITH 1 OR MORE BIOPSIES  06/06/2024    Procedure: COLONOSCOPY, WITH 1 OR MORE BIOPSIES;  Surgeon: Kevin Causey MD;  Location: [facility] ENDOSCOPY;  Service: Gastroenterology;;    COLONOSCOPY, WITH DIRECTED SUBMUCOSAL INJECTION  06/06/2024    Procedure: COLONOSCOPY, WITH DIRECTED SUBMUCOSAL INJECTION;  Surgeon: Kevin Causey MD;  Location: [facility] ENDOSCOPY;  Service: Gastroenterology;;    DIAGNOSTIC LAPAROSCOPY N/A 7/18/2024    Procedure: LAPAROSCOPY, DIAGNOSTIC;  Surgeon: Rubio Cast MD;  Location: [facility];  Service: General;  Laterality: N/A;   WITH LAP LIVER BIOPSY - ULTRASOUND GUIDED    EGD, WITH CLOSED BIOPSY  06/06/2024    Procedure: EGD, WITH CLOSED BIOPSY;  Surgeon: Kevin Causey MD;  Location[facility] END[facility]COPY;  Service: Gastroenterology;;    ESOPHAGOGASTRODUODENOSCOPY N/A 06/06/2024    Procedure: DOUBLE;  Surgeon: Kevin Causey MD;  Location: [facility] ENDOSCOPY;  Service: Gastroenterology;  Laterality: N/A;    HYSTERECTOMY      INSERTION OF TUNNELED CENTRAL VENOUS CATHETER (CVC) WITH SUBCUTANEOUS PORT N/A 7/18/2024    Procedure: [id number];  Surgeon: Rubio Cast MD;  Location: [facility] OR;  Service: General;  Laterality: N/A;    KNEE SURGERY      THYROID LOBECTOMY       Social History     Socioeconomic History    Marital status: Single   Tobacco Use    Smoking status: Never     Passive exposure: Never    Smokeless tobacco: Never   Substance and Sexual Activity    Alcohol use: Never    Drug use: Not Currently     Types: Cocaine     Comment: 5-6 YEARS QUIT ON SUBOXONE     Social Drivers of Health     Financial Resource Strain: Low Risk  (12/12/2024)    Overall Financial Resource Strain (CARDIA)     Difficulty of Paying Living Expenses: Not hard at all   Food Insecurity: No Food Insecurity (12/12/2024)    Hunger Vital Sign     Worried About Running Out of Food in the Last Year: Never true     Ran Out of Food in the Last Year: Never true   Transportation Needs: No Transportation Needs (12/12/2024)    TRANSPORTATION NEEDS     Transportation : No   Stress: No Stress Concern Present (12/12/2024)    [place] citizen [place] [employer] - Occupational Stress Questionnaire     Feeling of Stress : Not at all   Housing Stability: Low Risk  (12/12/2024)    Housing Stability Vital Sign     Unable to Pay for Housing in the Last Year: No     Homeless in the Last Year: No      Family History   Problem Relation Name Age of Onset    Hypertension Mother      Hypertension Father      Stroke Father      Pancreatic cancer Brother       Cancer Paternal Grandfather        Review of patient's allergies indicates:   Allergen Reactions    Ketorolac Hives    Tramadol Hives    Vancomycin analogues Hives    Oxaliplatin Rash     REACTED TWICE WHEN INFUSION 30MIN INTO INFUSION       Review of Systems   Constitutional:  Negative for activity change, fatigue, fever and unexpected weight change.   HENT:  Positive for mouth dryness. Negative for sore throat.    Eyes:  Negative for visual disturbance.   Respiratory:  Negative for cough and shortness of breath.    Cardiovascular:  Negative for chest pain.   Gastrointestinal:  Positive for abdominal pain (lower abdomin) and constipation. Negative for diarrhea, nausea and vomiting.   Endocrine: Negative for polyuria.   Genitourinary:  Negative for dysuria and hot flashes.   Integumentary:  Negative for rash.   Allergic/Immunologic: Negative for immunocompromised state.   Neurological:  Negative for weakness and headaches.   Hematological:  Negative for adenopathy.   Psychiatric/Behavioral:  Negative for confusion.          Objective:      There were no vitals filed for this visit.      Physical Exam  Constitutional:       General: She is not in acute distress.     Appearance: Normal appearance. She is not ill-appearing.   HENT:      Head: Normocephalic and atraumatic.      Nose: Nose normal.      Mouth/Throat:      Mouth: Mucous membranes are moist.      Pharynx: Oropharynx is clear.   Eyes:      Extraocular Movements: Extraocular movements intact.      Conjunctiva/sclera: Conjunctivae normal.      Pupils: Pupils are equal, round, and reactive to light.   Cardiovascular:      Rate and Rhythm: Normal rate and regular rhythm.      Pulses: Normal pulses.      Heart sounds: Normal heart sounds. No murmur heard.  Pulmonary:      Effort: Pulmonary effort is normal. No respiratory distress.      Breath sounds: Normal breath sounds.   Abdominal:      General: There is no distension.      Palpations: Abdomen is soft.       Tenderness: There is no abdominal tenderness.      Comments: No abdominal tenderness noted on exam with palpation   Musculoskeletal:         General: Normal range of motion.      Cervical back: Normal range of motion and neck supple.      Right lower leg: Edema present.      Left lower leg: Edema (worse than right) present.   Lymphadenopathy:      Cervical: No cervical adenopathy.   Skin:     General: Skin is warm and dry.   Neurological:      General: No focal deficit present.      Mental Status: She is alert and oriented to person, place, and time.         LABS AND IMAGING REVIEWED IN EPIC  12/9/24 CT CAP:  1. Scattered ill-defined low-attenuation lesions again evident throughout the liver which have a similar appearance to the previous exam  2. 9 mm nonobstructing calculi again evident at the lower left kidney  3. Ill-defined mucosal thickening at the right side of the colon and right transverse colon  4. Atelectasis and/or scarring at the posterior lower lungs  5. Findings and other details as above         Pathology:  6/6/24 EGD/Colonoscopy Biopsy:  1. STOMACH BIOPSY:    -MODERATE CHRONIC GASTRITIS, FOCALLY ACTIVE (SEE COMMENT).    -NO EVIDENCE OF DYSPLASIA OR MALIGNANCY.   2. COLON STRICTURE BIOPSY:    -FRAGMENTS OF COLONIC-TYPE MUCOSA WITH AT LEAST HIGH GRADE DYSPLASIA, SUSPICIOUS   FOR ADENOCARCINOMA (SEE COMMENTS).   7/18/24 Laparoscopy:  1. PERITONEUM, BIOPSY:    -MESOTHELIUM-LINED MEMBRANOUS FIBROADIPOSE TISSUE. NO NEOPLASM.   2. LEFT LOBE OF LIVER, WEDGE BIOPSY:    -METASTATIC COLORECTAL ADENOCARCINOMA.     Assessment:   Metastatic Colorectal Adenocarcinoma - mets to liver, extensive and unresectable. Currently on CAPEOX 1st line. NGS with KRAS mutation, therefore will add cetuximab to cycle 6.      Immunodeficiency due to Drug Therapy - Precautions discussed with patient. Continue to monitor for any signs of symptoms of infection. No prophylaxis needed at this time. No role for growth factor.      Acneform rash prevention   Doxycycline 100mg BID and hydrocortisone 1% cream sent to pharmacy for prevention.   Not currently using steroid cream  Hypokalemia   Has not been taking it as prescribed.  Only taking 20meq BID. Increased to 40meq TID. IV 20meq given today.   Bilateral LE swelling  Bilateral US negative  Strict ED precautions given.   Chronic constipation   Not currently controlled on linzess 72mcg.   Increased to 145mcg today.       Plan:   Labs reviewed.   Toxicity reviewed, Continue C6 Capox q3w + weekly cetuximab   Continue follow up with pain management  OTC Biotene for dry mouth   Lab and treat in one week: CBC, CMP, Mag  RTC in two weeks with MD for C7 Capox + weekly cetuximab  Needs to see MD for C7 and after C8.   Plan for repeat scans after C8, CT Chest and MRI Abd w/wo to further evaluate liver disease.  Cbc, cmp, cea- 1 hr prior @ [facility]    I spent a total of 40 minutes on the day of the visit.This includes face to face time and non-face to face time preparing to see the patient (eg, review of tests), obtaining and/or reviewing separately obtained history, documenting clinical information in the electronic or other health record, independently interpreting results and communicating results to the patient/family/caregiver, or care coordinator.    Enma Hickman, AZALEA-C  Oncology/Hematology   Cancer Center [facility]     The patient location is: [place], [place]  The chief complaint leading to consultation is: lab review/tox check   Visit type: Virtual visit with audio only (telephone)  Total time spent with patient:  10 minutes        The reason for the audio only service rather than synchronous audio and video virtual visit was related to technical difficulties or patient preference/necessity.     Each patient to whom I provide medical services by telemedicine is:  (1) informed of the relationship between the physician and patient and the respective role of any other health care provider with  respect to management of the patient; and (2) notified that they may decline to receive medical services by telemedicine and may withdraw from such care at any time. Patient verbally consented to receive this service via voice-only telephone call.

## 2025-01-12 ENCOUNTER — HOSPITAL ENCOUNTER (EMERGENCY)
Facility: HOSPITAL | Age: 55
Discharge: HOME | End: 2025-01-13
Payer: MEDICARE

## 2025-01-12 DIAGNOSIS — R41.0 DELIRIUM: ICD-10-CM

## 2025-01-12 DIAGNOSIS — E87.6 HYPOKALEMIA: Primary | ICD-10-CM

## 2025-01-12 DIAGNOSIS — R10.84 GENERALIZED ABDOMINAL PAIN: ICD-10-CM

## 2025-01-12 LAB
ALBUMIN SERPL-MCNC: 3.9 G/DL (ref 3.4–5)
ALBUMIN/GLOB SERPL: 0.7 RATIO
ALP SERPL-CCNC: 877 UNIT/L (ref 50–144)
ALT SERPL-CCNC: 30 UNIT/L (ref 1–45)
AMMONIA PLAS-MSCNC: 12 UMOL/L (ref 11–32)
AMPHET UR QL SCN: NEGATIVE
ANION GAP SERPL CALC-SCNC: 3 MEQ/L (ref 2–13)
ANISOCYTOSIS BLD QL SMEAR: ABNORMAL
AST SERPL-CCNC: 104 UNIT/L (ref 14–36)
BARBITURATE SCN PRESENT UR: NEGATIVE
BASOPHILS # BLD AUTO: 0.03 X10(3)/MCL (ref 0.01–0.08)
BASOPHILS NFR BLD AUTO: 0.3 % (ref 0.1–1.2)
BENZODIAZ UR QL SCN: NEGATIVE
BILIRUB SERPL-MCNC: 1.8 MG/DL (ref 0–1)
BILIRUB UR QL STRIP.AUTO: NEGATIVE
BUN SERPL-MCNC: 12 MG/DL (ref 7–20)
CALCIUM SERPL-MCNC: 8.9 MG/DL (ref 8.4–10.2)
CANNABINOIDS UR QL SCN: NEGATIVE
CHLORIDE SERPL-SCNC: 102 MMOL/L (ref 98–110)
CLARITY UR: CLEAR
CO2 SERPL-SCNC: 30 MMOL/L (ref 21–32)
COCAINE UR QL SCN: NEGATIVE
COLOR UR AUTO: YELLOW
CREAT SERPL-MCNC: 0.83 MG/DL (ref 0.66–1.25)
CREAT/UREA NIT SERPL: 14 (ref 12–20)
EOSINOPHIL # BLD AUTO: 0.02 X10(3)/MCL (ref 0.04–0.36)
EOSINOPHIL NFR BLD AUTO: 0.2 % (ref 0.7–7)
ERYTHROCYTE [DISTWIDTH] IN BLOOD BY AUTOMATED COUNT: 23.8 % (ref 11–14.5)
GFR SERPLBLD CREATININE-BSD FMLA CKD-EPI: 84 ML/MIN/1.73/M2
GLOBULIN SER-MCNC: 5.6 GM/DL (ref 2–3.9)
GLUCOSE SERPL-MCNC: 121 MG/DL (ref 70–115)
GLUCOSE UR QL STRIP: NEGATIVE
HCT VFR BLD AUTO: 34.4 % (ref 36–48)
HGB BLD-MCNC: 11.6 G/DL (ref 11.8–16)
HGB UR QL STRIP: NEGATIVE
IMM GRANULOCYTES # BLD AUTO: 0.03 X10(3)/MCL (ref 0–0.03)
IMM GRANULOCYTES NFR BLD AUTO: 0.3 % (ref 0–0.5)
KETONES UR QL STRIP: NEGATIVE
LEUKOCYTE ESTERASE UR QL STRIP: NEGATIVE
LIPASE SERPL-CCNC: 47 U/L (ref 23–300)
LYMPHOCYTES # BLD AUTO: 2.27 X10(3)/MCL (ref 1.16–3.74)
LYMPHOCYTES NFR BLD AUTO: 20.8 % (ref 20–55)
MCH RBC QN AUTO: 29.4 PG (ref 27–34)
MCHC RBC AUTO-ENTMCNC: 33.7 G/DL (ref 31–37)
MCV RBC AUTO: 87.3 FL (ref 79–99)
METHADONE UR QL SCN: NEGATIVE
MONOCYTES # BLD AUTO: 0.68 X10(3)/MCL (ref 0.24–0.36)
MONOCYTES NFR BLD AUTO: 6.2 % (ref 4.7–12.5)
NEUTROPHILS # BLD AUTO: 7.9 X10(3)/MCL (ref 1.56–6.13)
NEUTROPHILS NFR BLD AUTO: 72.2 % (ref 37–73)
NITRITE UR QL STRIP: NEGATIVE
NRBC BLD AUTO-RTO: 0 %
OPIATES UR QL SCN: POSITIVE
PCP UR QL: NEGATIVE
PH UR STRIP: 6.5 [PH]
PH UR: 6.5 [PH] (ref 5–8)
PLATELET # BLD AUTO: 128 X10(3)/MCL (ref 140–371)
PLATELET # BLD EST: ABNORMAL 10*3/UL
PMV BLD AUTO: 9.2 FL (ref 9.4–12.4)
POTASSIUM SERPL-SCNC: 2.3 MMOL/L (ref 3.5–5.1)
PROT SERPL-MCNC: 9.5 GM/DL (ref 6.3–8.2)
PROT UR QL STRIP: NEGATIVE
RBC # BLD AUTO: 3.94 X10(6)/MCL (ref 4–5.1)
RBC MORPH BLD: ABNORMAL
SODIUM SERPL-SCNC: 135 MMOL/L (ref 136–145)
SP GR UR STRIP.AUTO: <=1.005 (ref 1–1.03)
UROBILINOGEN UR STRIP-ACNC: 1
WBC # BLD AUTO: 10.93 X10(3)/MCL (ref 4–11.5)

## 2025-01-12 PROCEDURE — 96366 THER/PROPH/DIAG IV INF ADDON: CPT

## 2025-01-12 PROCEDURE — 96375 TX/PRO/DX INJ NEW DRUG ADDON: CPT

## 2025-01-12 PROCEDURE — 81003 URINALYSIS AUTO W/O SCOPE: CPT

## 2025-01-12 PROCEDURE — 36415 COLL VENOUS BLD VENIPUNCTURE: CPT

## 2025-01-12 PROCEDURE — 82140 ASSAY OF AMMONIA: CPT

## 2025-01-12 PROCEDURE — 25000003 PHARM REV CODE 250

## 2025-01-12 PROCEDURE — 99285 EMERGENCY DEPT VISIT HI MDM: CPT | Mod: 25

## 2025-01-12 PROCEDURE — 25500020 PHARM REV CODE 255

## 2025-01-12 PROCEDURE — 83690 ASSAY OF LIPASE: CPT

## 2025-01-12 PROCEDURE — 80053 COMPREHEN METABOLIC PANEL: CPT

## 2025-01-12 PROCEDURE — 63600175 PHARM REV CODE 636 W HCPCS

## 2025-01-12 PROCEDURE — 85025 COMPLETE CBC W/AUTO DIFF WBC: CPT

## 2025-01-12 PROCEDURE — 96372 THER/PROPH/DIAG INJ SC/IM: CPT

## 2025-01-12 PROCEDURE — 96365 THER/PROPH/DIAG IV INF INIT: CPT

## 2025-01-12 PROCEDURE — 80307 DRUG TEST PRSMV CHEM ANLYZR: CPT

## 2025-01-12 RX ORDER — LORAZEPAM 2 MG/ML
1 INJECTION INTRAMUSCULAR
Status: COMPLETED | OUTPATIENT
Start: 2025-01-12 | End: 2025-01-12

## 2025-01-12 RX ORDER — LORAZEPAM 2 MG/ML
1 INJECTION INTRAMUSCULAR
Status: DISCONTINUED | OUTPATIENT
Start: 2025-01-12 | End: 2025-01-12

## 2025-01-12 RX ORDER — POTASSIUM CHLORIDE 20 MEQ/1
40 TABLET, EXTENDED RELEASE ORAL
Status: DISCONTINUED | OUTPATIENT
Start: 2025-01-12 | End: 2025-01-13 | Stop reason: HOSPADM

## 2025-01-12 RX ORDER — ZIPRASIDONE MESYLATE 20 MG/ML
20 INJECTION, POWDER, LYOPHILIZED, FOR SOLUTION INTRAMUSCULAR
Status: COMPLETED | OUTPATIENT
Start: 2025-01-12 | End: 2025-01-12

## 2025-01-12 RX ORDER — POTASSIUM CHLORIDE 7.45 MG/ML
10 INJECTION INTRAVENOUS
Status: COMPLETED | OUTPATIENT
Start: 2025-01-12 | End: 2025-01-12

## 2025-01-12 RX ORDER — LORAZEPAM 2 MG/ML
2 INJECTION INTRAMUSCULAR
Status: COMPLETED | OUTPATIENT
Start: 2025-01-12 | End: 2025-01-12

## 2025-01-12 RX ORDER — HYDROMORPHONE HYDROCHLORIDE 2 MG/ML
2 INJECTION, SOLUTION INTRAMUSCULAR; INTRAVENOUS; SUBCUTANEOUS
Status: COMPLETED | OUTPATIENT
Start: 2025-01-12 | End: 2025-01-12

## 2025-01-12 RX ORDER — DIPHENHYDRAMINE HYDROCHLORIDE 50 MG/ML
25 INJECTION INTRAMUSCULAR; INTRAVENOUS
Status: COMPLETED | OUTPATIENT
Start: 2025-01-12 | End: 2025-01-12

## 2025-01-12 RX ORDER — HYDROMORPHONE HYDROCHLORIDE 1 MG/ML
1 INJECTION, SOLUTION INTRAMUSCULAR; INTRAVENOUS; SUBCUTANEOUS
Status: DISCONTINUED | OUTPATIENT
Start: 2025-01-12 | End: 2025-01-12

## 2025-01-12 RX ORDER — POTASSIUM CHLORIDE 7.45 MG/ML
10 INJECTION INTRAVENOUS
Status: COMPLETED | OUTPATIENT
Start: 2025-01-12 | End: 2025-01-13

## 2025-01-12 RX ADMIN — POTASSIUM CHLORIDE 10 MEQ: 7.46 INJECTION, SOLUTION INTRAVENOUS at 09:01

## 2025-01-12 RX ADMIN — LORAZEPAM 2 MG: 2 INJECTION, SOLUTION INTRAMUSCULAR; INTRAVENOUS at 08:01

## 2025-01-12 RX ADMIN — SODIUM CHLORIDE 500 ML: 9 INJECTION, SOLUTION INTRAVENOUS at 11:01

## 2025-01-12 RX ADMIN — POTASSIUM CHLORIDE 10 MEQ: 7.46 INJECTION, SOLUTION INTRAVENOUS at 11:01

## 2025-01-12 RX ADMIN — HYDROMORPHONE HYDROCHLORIDE 2 MG: 2 INJECTION, SOLUTION INTRAMUSCULAR; INTRAVENOUS; SUBCUTANEOUS at 08:01

## 2025-01-12 RX ADMIN — ZIPRASIDONE MESYLATE 20 MG: 20 INJECTION, POWDER, LYOPHILIZED, FOR SOLUTION INTRAMUSCULAR at 10:01

## 2025-01-12 RX ADMIN — POTASSIUM BICARBONATE 20 MEQ: 391 TABLET, EFFERVESCENT ORAL at 09:01

## 2025-01-12 RX ADMIN — IOHEXOL 50 ML: 300 INJECTION, SOLUTION INTRAVENOUS at 09:01

## 2025-01-12 RX ADMIN — LORAZEPAM 1 MG: 2 INJECTION, SOLUTION INTRAMUSCULAR; INTRAVENOUS at 10:01

## 2025-01-12 RX ADMIN — DIPHENHYDRAMINE HYDROCHLORIDE 25 MG: 50 INJECTION INTRAMUSCULAR; INTRAVENOUS at 10:01

## 2025-01-12 RX ADMIN — POTASSIUM CHLORIDE 10 MEQ: 7.46 INJECTION, SOLUTION INTRAVENOUS at 10:01

## 2025-01-13 VITALS
WEIGHT: 129 LBS | DIASTOLIC BLOOD PRESSURE: 73 MMHG | BODY MASS INDEX: 18.47 KG/M2 | HEIGHT: 70 IN | OXYGEN SATURATION: 95 % | RESPIRATION RATE: 15 BRPM | SYSTOLIC BLOOD PRESSURE: 118 MMHG | HEART RATE: 72 BPM | TEMPERATURE: 99 F

## 2025-01-13 LAB
ANION GAP SERPL CALC-SCNC: 3 MEQ/L (ref 2–13)
ANISOCYTOSIS BLD QL SMEAR: ABNORMAL
BASOPHILS # BLD AUTO: 0.02 X10(3)/MCL (ref 0.01–0.08)
BASOPHILS NFR BLD AUTO: 0.4 % (ref 0.1–1.2)
BUN SERPL-MCNC: 10 MG/DL (ref 7–20)
CALCIUM SERPL-MCNC: 8.7 MG/DL (ref 8.4–10.2)
CHLORIDE SERPL-SCNC: 110 MMOL/L (ref 98–110)
CO2 SERPL-SCNC: 28 MMOL/L (ref 21–32)
CREAT SERPL-MCNC: 0.65 MG/DL (ref 0.66–1.25)
CREAT/UREA NIT SERPL: 15 (ref 12–20)
EOSINOPHIL # BLD AUTO: 0.03 X10(3)/MCL (ref 0.04–0.36)
EOSINOPHIL NFR BLD AUTO: 0.6 % (ref 0.7–7)
ERYTHROCYTE [DISTWIDTH] IN BLOOD BY AUTOMATED COUNT: 24.4 % (ref 11–14.5)
GFR SERPLBLD CREATININE-BSD FMLA CKD-EPI: >90 ML/MIN/1.73/M2
GLUCOSE SERPL-MCNC: 72 MG/DL (ref 70–115)
HCT VFR BLD AUTO: 35.3 % (ref 36–48)
HGB BLD-MCNC: 11.2 G/DL (ref 11.8–16)
IMM GRANULOCYTES # BLD AUTO: 0.01 X10(3)/MCL (ref 0–0.03)
IMM GRANULOCYTES NFR BLD AUTO: 0.2 % (ref 0–0.5)
LYMPHOCYTES # BLD AUTO: 1.52 X10(3)/MCL (ref 1.16–3.74)
LYMPHOCYTES NFR BLD AUTO: 28.2 % (ref 20–55)
MCH RBC QN AUTO: 28.8 PG (ref 27–34)
MCHC RBC AUTO-ENTMCNC: 31.7 G/DL (ref 31–37)
MCV RBC AUTO: 90.7 FL (ref 79–99)
MONOCYTES # BLD AUTO: 0.51 X10(3)/MCL (ref 0.24–0.36)
MONOCYTES NFR BLD AUTO: 9.5 % (ref 4.7–12.5)
NEUTROPHILS # BLD AUTO: 3.3 X10(3)/MCL (ref 1.56–6.13)
NEUTROPHILS NFR BLD AUTO: 61.1 % (ref 37–73)
PLATELET # BLD AUTO: 91 X10(3)/MCL (ref 140–371)
PLATELET # BLD EST: ABNORMAL 10*3/UL
PMV BLD AUTO: 9.4 FL (ref 9.4–12.4)
POTASSIUM SERPL-SCNC: 3.4 MMOL/L (ref 3.5–5.1)
RBC # BLD AUTO: 3.89 X10(6)/MCL (ref 4–5.1)
SODIUM SERPL-SCNC: 141 MMOL/L (ref 136–145)
WBC # BLD AUTO: 5.39 X10(3)/MCL (ref 4–11.5)

## 2025-01-13 PROCEDURE — 85025 COMPLETE CBC W/AUTO DIFF WBC: CPT

## 2025-01-13 PROCEDURE — 80048 BASIC METABOLIC PNL TOTAL CA: CPT

## 2025-01-13 PROCEDURE — 63600175 PHARM REV CODE 636 W HCPCS

## 2025-01-13 PROCEDURE — 96376 TX/PRO/DX INJ SAME DRUG ADON: CPT

## 2025-01-13 PROCEDURE — 25000003 PHARM REV CODE 250: Performed by: FAMILY MEDICINE

## 2025-01-13 PROCEDURE — 96366 THER/PROPH/DIAG IV INF ADDON: CPT

## 2025-01-13 RX ORDER — DIPHENHYDRAMINE HYDROCHLORIDE 50 MG/ML
25 INJECTION INTRAMUSCULAR; INTRAVENOUS
Status: COMPLETED | OUTPATIENT
Start: 2025-01-13 | End: 2025-01-13

## 2025-01-13 RX ORDER — POTASSIUM CHLORIDE 7.45 MG/ML
10 INJECTION INTRAVENOUS
Status: COMPLETED | OUTPATIENT
Start: 2025-01-13 | End: 2025-01-13

## 2025-01-13 RX ORDER — POTASSIUM CHLORIDE 20 MEQ/1
40 TABLET, EXTENDED RELEASE ORAL
Status: DISCONTINUED | OUTPATIENT
Start: 2025-01-13 | End: 2025-01-13

## 2025-01-13 RX ADMIN — POTASSIUM CHLORIDE 10 MEQ: 7.46 INJECTION, SOLUTION INTRAVENOUS at 01:01

## 2025-01-13 RX ADMIN — POTASSIUM CHLORIDE 10 MEQ: 7.46 INJECTION, SOLUTION INTRAVENOUS at 03:01

## 2025-01-13 RX ADMIN — POTASSIUM BICARBONATE 40 MEQ: 391 TABLET, EFFERVESCENT ORAL at 06:01

## 2025-01-13 RX ADMIN — DIPHENHYDRAMINE HYDROCHLORIDE 25 MG: 50 INJECTION INTRAMUSCULAR; INTRAVENOUS at 12:01

## 2025-01-13 NOTE — ED PROVIDER NOTES
Encounter Date: 1/12/2025       History     Chief Complaint   Patient presents with    Abdominal Pain    Rectal Bleeding     Diffuse abd pain onset today - small amt (vague character) rectal bleeding today  - denies urinary c/o      54-year-old female, well known to me, presents with generalized abdominal pain, and bilateral flank pain, that started earlier today.  She was constipated for a few days, and finally had a bowel movement this afternoon.  She denies any nausea, vomiting or diarrhea.  There has been no fever or chills.  She has colon CA with metastases.  She also has a long history of chronic pain, and recurrent abdominal pain.  Family also finds that she has been a bit confused today.    The history is provided by the patient and medical records.     Review of patient's allergies indicates:   Allergen Reactions    Ketorolac Hives    Tramadol Hives    Vancomycin analogues Hives    Oxaliplatin Rash     REACTED TWICE WHEN INFUSION 30MIN INTO INFUSION      Past Medical History:   Diagnosis Date    Brain tumor     Cervical ca     Depression     Malignant neoplasm of colon, unspecified     Opioid abuse      Past Surgical History:   Procedure Laterality Date    BRAIN SURGERY      CHOLECYSTECTOMY      COLONOSCOPY N/A 06/06/2024    Procedure: COLON;  Surgeon: Kevin Causey MD;  Location: [facility] ENDOSCOPY;  Service: Gastroenterology;  Laterality: N/A;    COLONOSCOPY, WITH 1 OR MORE BIOPSIES  06/06/2024    Procedure: COLONOSCOPY, WITH 1 OR MORE BIOPSIES;  Surgeon: Kevin Causey MD;  Location: [facility] ENDOSCOPY;  Service: Gastroenterology;;    COLONOSCOPY, WITH DIRECTED SUBMUCOSAL INJECTION  06/06/2024    Procedure: COLONOSCOPY, WITH DIRECTED SUBMUCOSAL INJECTION;  Surgeon: Kevin Causey MD;  Location: [facility] ENDOSCOPY;  Service: Gastroenterology;;    DIAGNOSTIC LAPAROSCOPY N/A 7/18/2024    Procedure: LAPAROSCOPY, DIAGNOSTIC;  Surgeon: Rubio Cast MD;  Location: [facility] OR;   Service: General;  Laterality: N/A;  WITH LAP LIVER BIOPSY - ULTRASOUND GUIDED    EGD, WITH CLOSED BIOPSY  06/06/2024    Procedure: EGD, WITH CLOSED BIOPSY;  Surgeon: Kevin Causey MD;  Location: [facility] ENDOSCOPY;  Service: Gastroenterology;;    ESOPHAGOGASTRODUODENOSCOPY N/A 06/06/2024    Procedure: DOUBLE;  Surgeon: Kevin Causey MD;  Location: [facility] ENDOSCOPY;  Service: Gastroenterology;  Laterality: N/A;    HYSTERECTOMY      INSERTION OF TUNNELED CENTRAL VENOUS CATHETER (CVC) WITH SUBCUTANEOUS PORT N/A 7/18/2024    Procedure: [id number];  Surgeon: Rubio Cast MD;  Location: [facility];  Service: General;  Laterality: N/A;    KNEE SURGERY      THYROID LOBECTOMY       Family History   Problem Relation Name Age of Onset    Hypertension Mother      Hypertension Father      Stroke Father      Pancreatic cancer Brother      Cancer Paternal Grandfather       Social History     Tobacco Use    Smoking status: Never     Passive exposure: Never    Smokeless tobacco: Never   Substance Use Topics    Alcohol use: Never    Drug use: Not Currently     Types: Cocaine     Comment: 5-6 YEARS QUIT ON SUBOXONE     Review of Systems   Constitutional:  Negative for fever.   HENT:  Negative for sore throat.    Respiratory:  Negative for shortness of breath.    Cardiovascular:  Negative for chest pain.   Gastrointestinal:  Positive for abdominal pain. Negative for nausea.   Genitourinary:  Positive for flank pain. Negative for dysuria.   Musculoskeletal:  Negative for back pain.   Skin:  Negative for rash.   Neurological:  Negative for weakness.   Hematological:  Does not bruise/bleed easily.   All other systems reviewed and are negative.      Physical Exam     Initial Vitals [01/12/25 1917]   BP Pulse Resp Temp SpO2   (!) 139/90 (!) 115 20 98.5 °F (36.9 °C) 98 %      MAP       --         Physical Exam    Nursing note and vitals reviewed.  Constitutional: Vital signs are normal. She appears  well-developed and well-nourished. She is cooperative.   She looks pretty comfortable.   HENT:   Head: Normocephalic and atraumatic. Mouth/Throat: Oropharynx is clear and moist.   Eyes: Conjunctivae, EOM and lids are normal. Pupils are equal, round, and reactive to light.   Neck: Trachea normal. Neck supple.   Normal range of motion.  Cardiovascular:  Normal rate, regular rhythm, normal heart sounds and intact distal pulses.           Pulmonary/Chest: Breath sounds normal.   Abdominal: Abdomen is soft. Bowel sounds are normal. She exhibits no distension. There is no abdominal tenderness. There is no rebound and no guarding.   Musculoskeletal:         General: Normal range of motion.      Cervical back: Normal, normal range of motion and neck supple.      Lumbar back: Normal.     Neurological: She is alert and oriented to person, place, and time. She has normal strength. Coordination normal. GCS score is 15. GCS eye subscore is 4. GCS verbal subscore is 5. GCS motor subscore is 6.   Skin: Skin is warm, dry and intact. Capillary refill takes less than 2 seconds.   Psychiatric: She has a normal mood and affect. Her speech is normal and behavior is normal. Judgment and thought content normal. Cognition and memory are normal.         ED Course   Procedures  Labs Reviewed   COMPREHENSIVE METABOLIC PANEL - Abnormal       Result Value    Sodium 135 (*)     Potassium 2.3 (*)     Chloride 102      CO2 30      Glucose 121 (*)     Blood Urea Nitrogen 12      Creatinine 0.83      Calcium 8.9      Protein Total 9.5 (*)     Albumin 3.9      Globulin 5.6 (*)     Albumin/Globulin Ratio 0.7      Bilirubin Total 1.8 (*)      (*)     ALT 30       (*)     eGFR 84      Anion Gap 3.0      BUN/Creatinine Ratio 14     CBC WITH DIFFERENTIAL - Abnormal    WBC 10.93      RBC 3.94 (*)     Hgb 11.6 (*)     Hct 34.4 (*)     MCV 87.3      MCH 29.4      MCHC 33.7      RDW 23.8 (*)     Platelet 128 (*)     MPV 9.2 (*)     Neut % 72.2       Lymph % 20.8      Mono % 6.2      Eos % 0.2 (*)     Basophil % 0.3      Lymph # 2.27      Neut # 7.90 (*)     Mono # 0.68 (*)     Eos # 0.02 (*)     Baso # 0.03      Imm Gran # 0.03      Imm Grans % 0.3      NRBC% 0.0     BLOOD SMEAR MICROSCOPIC EXAM ([facility]) - Abnormal    RBC Morph Abnormal (*)     Anisocytosis 2+ (*)     Platelets Decreased (*)    DRUG SCREEN, URINE (BEAKER) - Abnormal    Amphetamines, Urine Negative      Barbiturates, Urine Negative      Benzodiazepine, Urine Negative      Cannabinoids, Urine Negative      Cocaine, Urine Negative      Opiates, Urine Positive (*)     Phencyclidine, Urine Negative      Methadone, Urine Negative      pH, Urine 6.5      Narrative:     Cut off concentrations:    Amphetamines - 1000 ng/ml  Barbiturates - 200 ng/ml  Benzodiazepine - 200 ng/ml  Cannabinoids (THC) - 50 ng/ml  Cocaine - 300 ng/ml  Fentanyl - 1.0 ng/ml  MDMA - 500 ng/ml  Opiates - 300 ng/ml   Phencyclidine (PCP) - 25 ng/ml  Methadone - 300 ng/ml      False negatives may result form substances such as bleach added to urine.  False positives may result for the presence of a substance with similar chemical structure to the drug or its metabolite.    This test provides only a PRELIMINARY analytical test result. A more specific alternate chemical method must be used in order to obtain a confirmed analytical result. Gas chromatography/mass spectrometry (GC/MS) is the preferred confirmatory method. Other chemical confirmation methods are available. Clinical consideration and professional judgement should be applied to any drug of abuse test result, particularly when preliminary positive results are used.    Positive results will be confirmed only at the physicians request. Unconfirmed screening results are to be used only for medical purposes (treatment).          BASIC METABOLIC PANEL - Abnormal    Sodium 141      Potassium 3.4 (*)     Chloride 110      CO2 28      Glucose 72      Blood Urea Nitrogen 10       Creatinine 0.65 (*)     BUN/Creatinine Ratio 15      Calcium 8.7      Anion Gap 3.0      eGFR >90     CBC WITH DIFFERENTIAL - Abnormal    WBC 5.39      RBC 3.89 (*)     Hgb 11.2 (*)     Hct 35.3 (*)     MCV 90.7      MCH 28.8      MCHC 31.7      RDW 24.4 (*)     Platelet 91 (*)     MPV 9.4      Neut % 61.1      Lymph % 28.2      Mono % 9.5      Eos % 0.6 (*)     Basophil % 0.4      Lymph # 1.52      Neut # 3.30      Mono # 0.51 (*)     Eos # 0.03 (*)     Baso # 0.02      Imm Gran # 0.01      Imm Grans % 0.2     LIPASE - Normal    Lipase Level 47     URINALYSIS, REFLEX TO URINE CULTURE - Normal    Color, UA Yellow      Appearance, UA Clear      Specific Gravity, UA <=1.005      pH, UA 6.5      Protein, UA Negative      Glucose, UA Negative      Ketones, UA Negative      Blood, UA Negative      Bilirubin, UA Negative      Urobilinogen, UA 1.0      Nitrites, UA Negative      Leukocyte Esterase, UA Negative      Narrative:      URINE STABILITY IS 2 HOURS AT ROOM TEMP OR    SIX HOURS REFRIGERATED. PERFORMING TESTING ON    SPECIMENS GREATER THAN THIS AGE MAY AFFECT THE    FOLLOWING TESTS:    PH          SPECIFIC GRAVITY           BLOOD    CLARITY     BILIRUBIN               UROBILINOGEN   AMMONIA - Normal    Ammonia Level 12.0     CBC W/ AUTO DIFFERENTIAL    Narrative:     The following orders were created for panel order CBC W/ AUTO DIFFERENTIAL.  Procedure                               Abnormality         Status                     ---------                               -----------         ------                     CBC with Differential[[id number]]       Abnormal            Final result                 Please view results for these tests on the individual orders.   CBC W/ AUTO DIFFERENTIAL    Narrative:     The following orders were created for panel order CBC auto differential.  Procedure                               Abnormality         Status                     ---------                               -----------          ------                     CBC with Differential[[id number]]       Abnormal            Final result                 Please view results for these tests on the individual orders.   BLOOD SMEAR MICROSCOPIC EXAM (OLG)          Imaging Results              CT Head W Wo Contrast (Final result)  Result time 01/12/25 21:43:10      Final result by Joe Osuna MD (01/12/25 21:43:10)                   Impression:        1. Stable examination with no significant interval change since the previous exam of 05/14/2024.  No evidence of metastatic disease is identified.    n/a    Category: n/a    The following dose reduction techniques are used for all CT at [facility]:    1.   Automated exposure control.    2.   Adjustment of the mA and/or kV according to patient size.    3.   Use of iterative reconstruction technique.      Electronically signed by: Joe Osuna  Date:    01/12/2025  Time:    21:43               Narrative:    EXAMINATION:  CT HEAD WITH AND WITHOUT    CLINICAL HISTORY:  Mental status change, unknown cause;Metastatic disease evaluation;    TECHNIQUE:  Multiplanar imaging was performed before and following the intravenous administration of 50 cc of of Omnipaque 300.    COMPARISON:  05/14/2024    FINDINGS:  Axial imaging through the head/brain was performed.The images show previously-seen generalized atrophy.  Previously noted ventriculoperitoneal shunts are also again noted.  The ventricles are not significantly dilated.  A previously seen area of encephalomalacia is again noted in the right posterior parietal region and appears not significantly changed.  No evidence of an intraparenchymal mass or hemorrhagic lesion is identified.  I see no extraxial masses or abnormal fluid collections.  The contrast images show normal vascular enhancement with no abnormal enhancement identified.                                       CT Abdomen Pelvis  Without Contrast (Final result)  Result time  01/12/25 20:30:43      Final result by Joe Osuna MD (01/12/25 20:30:43)                   Impression:        1. The patient demonstrates findings suggestive of metastatic disease involving the visible lung base, the liver as well as a circumferential 0 partially obstructing colonic mass involving the proximal descending colon.    2.  A circumferential partially obstructing colonic mass involving the proximal descending colon suspicious for a malignancy with partial obstruction of the colon proximal to this mass.  There is also thickening of the wall of the colon which can be seen with colitis or perhaps metastatic disease.    3.  A previously seen 8 mm radiopaque nonobstructing calculus involving the lower pole calyx of the left kidney.  I see no evidence of obstructive uropathy.    4.  There is apparent thickening of the urinary bladder wall which can be seen with chronic cystitis.    5.  Other chronic findings as described above and previously seen.    n/a    CATEGORY: n/a    The following dose reduction techniques are used for all CT at [facility]:    1.   Automated exposure control.    2.   Adjustment of the mA and/or kV according to patient size.    3.   Use of iterative reconstruction technique.      Electronically signed by: Joe Osuna  Date:    01/12/2025  Time:    20:30               Narrative:    EXAMINATION:  CT ABDOMEN PELVIS WITHOUT CONTRAST    CLINICAL HISTORY:  Flank pain, kidney stone suspected;Nausea/vomiting;Abdominal pain, acute, nonlocalized;Metastatic disease evaluation;    TECHNIQUE:  Low dose axial images, sagittal and coronal reformations were obtained from the lung bases to the pubic symphysis.  Oral contrast was not administered.    COMPARISON:  12/27/2024    FINDINGS:  There is partial visualization of a few tiny nodules involving the lung base suspicious for pulmonary metastasis as well as of at least 1 calcified granuloma.    Liver:  I suspect a small  amount of fluid surrounding the liver and there is in homogeneity to the hepatic parenchyma suspicious for metastatic disease.    Gallbladder/Biliary System:  Postsurgical findings are present compatible with a previous cholecystectomy and a previously seen radiopaque density is also again noted involving the gallbladder fossa having a similar appearance to the previous.    Spleen:  I suspect a small amount of fluid surrounding the spleen.    Adrenal glands:  No clinically significant abnormalities noted.    Pancreas:  The pancreas is atrophic.    Kidneys/Urinary Tract:  The left kidney demonstrates a previously seen 8 mm radiopaque nonobstructing calculus involving the lower pole calyx of the left kidney.  I see no evidence of obstructive uropathy.    Urinary bladder:  Although it is difficult to evaluate because the volume of fluid within the urinary bladder is small, I still suspect that there is some thickening of the urinary bladder wall which can be seen with chronic cystitis.    Prostate gland/uterus and ovaries:  Compatible with a previous hysterectomy.    GI tract:  I suspect of thickening of the wall of the esophagus and the stomach peer the colon is redundant and contains a fairly prominent amount of fecal content.  The wall of the colon is also thickened which can be seen with colitis.  A segment of irregular wall thickening is present involving the descending colon, just distal to the splenic flexure which is suspicious for a neoplastic process.  The colon proximal to this area of of suspected neoplasm is dilated and contains air-fluid level as well as wall thickening indicative of some degree of obstruct partial obstruction in the area of the suspected mass involving the proximal descending colon.  There is suspected mild mesenteric stranding.  Small colonic diverticula are also suspected.    Vascular structures:  No clinically significant abnormalities noted.    Musculoskeletal structures:  Moderate  bony demineralization is present with moderate degenerative findings and a gentle chronic appearing accentuation to the thoracic kyphosis.    Miscellaneous: A catheter is present in the abdomen and pelvis which I suspect represents a ventriculoperitoneal shunt.                                       Medications   potassium chloride SA CR tablet 40 mEq (40 mEq Oral Not Given 1/12/25 2100)   potassium chloride SA CR tablet 40 mEq (has no administration in time range)   HYDROmorphone (PF) injection 2 mg (2 mg Intramuscular Given 1/12/25 2014)   LORazepam injection 2 mg (2 mg Intramuscular Given 1/12/25 2015)   potassium chloride 10 mEq in 100 mL IVPB (0 mEq Intravenous Stopped 1/12/25 2238)   iohexoL (OMNIPAQUE 300) injection 100 mL (50 mLs Intravenous Given 1/12/25 2116)   potassium bicarbonate disintegrating tablet 20 mEq (20 mEq Oral Given 1/12/25 2128)   ziprasidone injection 20 mg (20 mg Intramuscular Given 1/12/25 2216)   LORazepam injection 1 mg (1 mg Intravenous Given 1/12/25 2216)   diphenhydrAMINE injection 25 mg (25 mg Intravenous Given 1/12/25 2216)   potassium chloride 10 mEq in 100 mL IVPB (0 mEq Intravenous Stopped 1/12/25 2339)   sodium chloride 0.9% bolus 500 mL 500 mL (0 mLs Intravenous Stopped 1/13/25 0003)   potassium chloride 10 mEq in 100 mL IVPB (0 mEq Intravenous Stopped 1/13/25 0041)   diphenhydrAMINE injection 25 mg (25 mg Intravenous Given 1/13/25 0035)   potassium chloride 10 mEq in 100 mL IVPB (0 mEq Intravenous Stopped 1/13/25 0258)   potassium chloride 10 mEq in 100 mL IVPB (0 mEq Intravenous Stopped 1/13/25 0423)     Medical Decision Making  Generalized abdominal pain, bilateral flank pain, extensive surgical and medical history, confusion per family  Differential diagnosis:  Bowel obstruction, intra-abdominal infection or inflammation, UTI, pancreatitis, recurrent abdominal pain, exacerbation of chronic pain, CNS metastases  Analgesia, labs, CT abdomen, CT head    Patient seen on  assumption of care from Dr. Hall at 5:00 a.m..  Patient is stable at the time of transfer care.  CT head negative.  Repeat CMP showed a potassium of 3.4.  Patient mental status had improved dramatically.  Patient is symptomatically improved at time of discharge.    Amount and/or Complexity of Data Reviewed  Independent Historian: caregiver     Details: Daughter gives additional information:  Pain management just increased her morphine 2 days ago.  She has been getting more confused since the change.  She reports patient is on hospice, and was given a 6-12 month prognosis when she was diagnosed 8 months ago.  Labs: ordered.  Radiology: ordered.  Discussion of management or test interpretation with external provider(s): As she has been in the ED, she has become more agitated.  She is not sitting still in the bed, she wants to get up and move around.  She seems more confused.  At this point, although she is a hospice patient, she is not in any shape to go home.  We will give her some sedation, continue to replace her potassium, and watch her in the ED, as there are no inpatient beds available in [facility].    Risk  Prescription drug management.                                      Clinical Impression:  Final diagnoses:  [E87.6] Hypokalemia (Primary)  [R41.0] Delirium  [R10.84] Generalized abdominal pain                 Jamal Wooten MD  01/13/25 0604

## 2025-01-14 ENCOUNTER — INFUSION (OUTPATIENT)
Facility: HOSPITAL | Age: 55
End: 2025-01-14
Payer: MEDICARE

## 2025-01-14 VITALS
HEIGHT: 70 IN | SYSTOLIC BLOOD PRESSURE: 120 MMHG | TEMPERATURE: 98 F | OXYGEN SATURATION: 98 % | WEIGHT: 131.38 LBS | DIASTOLIC BLOOD PRESSURE: 77 MMHG | BODY MASS INDEX: 18.81 KG/M2 | RESPIRATION RATE: 18 BRPM | HEART RATE: 99 BPM

## 2025-01-14 DIAGNOSIS — C18.9 COLON ADENOCARCINOMA: Primary | ICD-10-CM

## 2025-01-14 DIAGNOSIS — E83.52 HYPERCALCEMIA: ICD-10-CM

## 2025-01-14 DIAGNOSIS — C78.7 METASTASIS TO LIVER: ICD-10-CM

## 2025-01-14 PROCEDURE — 96413 CHEMO IV INFUSION 1 HR: CPT

## 2025-01-14 PROCEDURE — A4216 STERILE WATER/SALINE, 10 ML: HCPCS

## 2025-01-14 PROCEDURE — 96366 THER/PROPH/DIAG IV INF ADDON: CPT

## 2025-01-14 PROCEDURE — 96375 TX/PRO/DX INJ NEW DRUG ADDON: CPT

## 2025-01-14 PROCEDURE — 96368 THER/DIAG CONCURRENT INF: CPT

## 2025-01-14 PROCEDURE — 25000003 PHARM REV CODE 250

## 2025-01-14 PROCEDURE — 96365 THER/PROPH/DIAG IV INF INIT: CPT

## 2025-01-14 PROCEDURE — 63600175 PHARM REV CODE 636 W HCPCS

## 2025-01-14 RX ORDER — HEPARIN 100 UNIT/ML
500 SYRINGE INTRAVENOUS
OUTPATIENT
Start: 2025-02-06

## 2025-01-14 RX ORDER — HEPARIN 100 UNIT/ML
500 SYRINGE INTRAVENOUS
Status: DISCONTINUED | OUTPATIENT
Start: 2025-01-14 | End: 2025-01-14 | Stop reason: HOSPADM

## 2025-01-14 RX ORDER — EPINEPHRINE 0.3 MG/.3ML
0.3 INJECTION SUBCUTANEOUS ONCE AS NEEDED
Status: DISCONTINUED | OUTPATIENT
Start: 2025-01-14 | End: 2025-01-14 | Stop reason: HOSPADM

## 2025-01-14 RX ORDER — SODIUM CHLORIDE 0.9 % (FLUSH) 0.9 %
10 SYRINGE (ML) INJECTION
OUTPATIENT
Start: 2025-02-06

## 2025-01-14 RX ORDER — SODIUM CHLORIDE 0.9 % (FLUSH) 0.9 %
10 SYRINGE (ML) INJECTION
Status: DISCONTINUED | OUTPATIENT
Start: 2025-01-14 | End: 2025-01-14 | Stop reason: HOSPADM

## 2025-01-14 RX ORDER — MAGNESIUM SULFATE HEPTAHYDRATE 40 MG/ML
2 INJECTION, SOLUTION INTRAVENOUS
Status: COMPLETED | OUTPATIENT
Start: 2025-01-14 | End: 2025-01-14

## 2025-01-14 RX ORDER — DIPHENHYDRAMINE HYDROCHLORIDE 50 MG/ML
50 INJECTION INTRAMUSCULAR; INTRAVENOUS ONCE AS NEEDED
Status: DISCONTINUED | OUTPATIENT
Start: 2025-01-14 | End: 2025-01-14 | Stop reason: HOSPADM

## 2025-01-14 RX ORDER — DIPHENHYDRAMINE HYDROCHLORIDE 50 MG/ML
25 INJECTION INTRAMUSCULAR; INTRAVENOUS
Status: COMPLETED | OUTPATIENT
Start: 2025-01-14 | End: 2025-01-14

## 2025-01-14 RX ORDER — POTASSIUM CHLORIDE 29.8 MG/ML
40 INJECTION INTRAVENOUS ONCE
Status: COMPLETED | OUTPATIENT
Start: 2025-01-14 | End: 2025-01-14

## 2025-01-14 RX ORDER — PROCHLORPERAZINE EDISYLATE 5 MG/ML
5 INJECTION INTRAMUSCULAR; INTRAVENOUS ONCE AS NEEDED
Status: DISCONTINUED | OUTPATIENT
Start: 2025-01-14 | End: 2025-01-14 | Stop reason: HOSPADM

## 2025-01-14 RX ADMIN — MAGNESIUM SULFATE HEPTAHYDRATE 2 G: 40 INJECTION, SOLUTION INTRAVENOUS at 11:01

## 2025-01-14 RX ADMIN — POTASSIUM CHLORIDE 40 MEQ: 29.8 INJECTION, SOLUTION INTRAVENOUS at 11:01

## 2025-01-14 RX ADMIN — CETUXIMAB 455 MG: 2 SOLUTION INTRAVENOUS at 11:01

## 2025-01-14 RX ADMIN — DIPHENHYDRAMINE HYDROCHLORIDE 25 MG: 50 INJECTION INTRAMUSCULAR; INTRAVENOUS at 11:01

## 2025-01-14 RX ADMIN — HEPARIN 500 UNITS: 100 SYRINGE at 03:01

## 2025-01-14 RX ADMIN — Medication 10 ML: at 03:01

## 2025-01-14 NOTE — PROGRESS NOTES
Pt presented for infusion today, weekly cetuximab. She was seen in ED on 1/12/2025 for confusion and weakness. She was hypokalemic. Potassium was replaced. Confusion improved after potassium correction. CT head was negative. Infusion noted pt continues to be weak today. Alert and oriented x 4, but sleepy. Infusion nurse noted pain meds were recently increased with pain management. Sister feels pt is taking too much pain medication. She was instructed to contact pain med clinic. Strict ED precautions given. Okay to proceed with chemo today as scheduled. IV potassium and mag ordered.

## 2025-01-14 NOTE — NURSING
Pt arrived to room with sister. Noted patient had bruising to left side of jaw. Sister reports has been having frequent falls, thinks it is due to increasing pain medication. Pt had ER visit recently, was treated for pain/hypokalemia, kept in ER overnight. Sister reports was very confused over past week, today is improved. Awaiting lab results, will contact provider.

## 2025-01-23 ENCOUNTER — DOCUMENTATION ONLY (OUTPATIENT)
Dept: HEMATOLOGY/ONCOLOGY | Facility: CLINIC | Age: 55
End: 2025-01-23
Payer: MEDICARE

## 2025-01-23 NOTE — PROGRESS NOTES
Patient admitted with [facility]. All appointments have been cancelled and provider notified.

## (undated) DEVICE — CANNULA LAP SEAL Z THRD 5X100

## (undated) DEVICE — WARMER DRAPE STERILE LF

## (undated) DEVICE — UNDERPAD PROTECT PLUS 17X24IN

## (undated) DEVICE — DRESSING TRANS 4X4 TEGADERM

## (undated) DEVICE — IRRIGATOR SUCTION W/TIP

## (undated) DEVICE — Device

## (undated) DEVICE — CHLORAPREP W TINT 26ML APPL

## (undated) DEVICE — PAD PINK TRENDELENBURG POS XL

## (undated) DEVICE — ADHESIVE DERMABOND ADVANCED

## (undated) DEVICE — KIT SURGICAL COLON .25 1.1OZ

## (undated) DEVICE — CONTAINER SPECIMEN SCREW 4OZ

## (undated) DEVICE — TROCAR ENDO Z THREAD KII 5X100

## (undated) DEVICE — TRAY CATH FOL SIL URIMTR 16FR

## (undated) DEVICE — ELECTRODE PATIENT RETURN DISP

## (undated) DEVICE — FORCEP ALLIGATOR 2.8MM W/NDL

## (undated) DEVICE — DEVICE RESOLUTION 360 CLIP

## (undated) DEVICE — SUT VICRYL 3-0 27 SH

## (undated) DEVICE — BOWL STERILE LARGE 32OZ

## (undated) DEVICE — DRESSING TELFA N ADH 3X8

## (undated) DEVICE — SOL IRRI STRL WATER 1000ML

## (undated) DEVICE — APPLICATOR STRL COT 2INNR 6IN

## (undated) DEVICE — GLOVE PROTEXIS HYDROGEL SZ6.5

## (undated) DEVICE — SOL NORMAL USPCA 0.9%

## (undated) DEVICE — NDL HYPO REG 25G X 1 1/2

## (undated) DEVICE — TUBING O2 FEMALE CONN 13FT

## (undated) DEVICE — SYR 10CC LUER LOCK

## (undated) DEVICE — BLOCK BLOX BITE DENT RIM 54FR

## (undated) DEVICE — PAD OVERLAY MATTRESS 32X73X3IN

## (undated) DEVICE — SCISSOR CURVED ENDOPATH 5MM

## (undated) DEVICE — GLOVE PROTEXIS HYDROGEL SZ7

## (undated) DEVICE — KIT SURGICAL TURNOVER

## (undated) DEVICE — BAG LABGUARD BIOHAZARD 6X9IN

## (undated) DEVICE — ADAPTER DUAL NSL LUER M-M 7FT

## (undated) DEVICE — COVER C-ARM STRAP BAND 44X80IN

## (undated) DEVICE — GLOVE PROTEXIS BLUE LATEX 7

## (undated) DEVICE — BLADE SURG STAINLESS STEEL #11

## (undated) DEVICE — COVER PROBE US 5.5X58L NON LTX

## (undated) DEVICE — SUT MCRYL PLUS 4-0 PS2 27IN

## (undated) DEVICE — BAG MEDI-PLAST DECANTER C-FLOW

## (undated) DEVICE — CUP PROFEX GLASS GRADUATE 1OZ

## (undated) DEVICE — COLLECTION SPECIMEN NEPTUNE

## (undated) DEVICE — KIT CANIST SUCTION 1200CC

## (undated) DEVICE — CORD LAP 10 DISP

## (undated) DEVICE — NDL PERC ENTRY BSDN 18-7.0

## (undated) DEVICE — MARKER ENDOSCOPIC SPOT EX

## (undated) DEVICE — TIP SUCTION YANKAUER

## (undated) DEVICE — FORCEP BX LG CAP 2.8MMX240CM